# Patient Record
Sex: FEMALE | Race: WHITE | NOT HISPANIC OR LATINO | Employment: OTHER | URBAN - METROPOLITAN AREA
[De-identification: names, ages, dates, MRNs, and addresses within clinical notes are randomized per-mention and may not be internally consistent; named-entity substitution may affect disease eponyms.]

---

## 2017-06-07 ENCOUNTER — CONVERSION ENCOUNTER (OUTPATIENT)
Dept: MAMMOGRAPHY | Facility: HOSPITAL | Age: 47
End: 2017-06-07

## 2018-02-14 ENCOUNTER — PROCEDURE VISIT CONVERTED (OUTPATIENT)
Dept: PODIATRY | Facility: CLINIC | Age: 48
End: 2018-02-14
Attending: PODIATRIST

## 2018-04-16 ENCOUNTER — CONVERSION ENCOUNTER (OUTPATIENT)
Dept: MAMMOGRAPHY | Facility: HOSPITAL | Age: 48
End: 2018-04-16

## 2018-04-18 ENCOUNTER — PROCEDURE VISIT CONVERTED (OUTPATIENT)
Dept: PODIATRY | Facility: CLINIC | Age: 48
End: 2018-04-18
Attending: PODIATRIST

## 2018-04-30 ENCOUNTER — CONVERSION ENCOUNTER (OUTPATIENT)
Dept: MAMMOGRAPHY | Facility: HOSPITAL | Age: 48
End: 2018-04-30

## 2018-06-01 ENCOUNTER — CONVERSION ENCOUNTER (OUTPATIENT)
Dept: MAMMOGRAPHY | Facility: HOSPITAL | Age: 48
End: 2018-06-01

## 2018-06-20 ENCOUNTER — PROCEDURE VISIT CONVERTED (OUTPATIENT)
Dept: PODIATRY | Facility: CLINIC | Age: 48
End: 2018-06-20
Attending: PODIATRIST

## 2018-09-12 ENCOUNTER — PROCEDURE VISIT CONVERTED (OUTPATIENT)
Dept: PODIATRY | Facility: CLINIC | Age: 48
End: 2018-09-12
Attending: PODIATRIST

## 2018-11-08 ENCOUNTER — CONVERSION ENCOUNTER (OUTPATIENT)
Dept: MAMMOGRAPHY | Facility: HOSPITAL | Age: 48
End: 2018-11-08

## 2018-11-16 ENCOUNTER — PROCEDURE VISIT CONVERTED (OUTPATIENT)
Dept: PODIATRY | Facility: CLINIC | Age: 48
End: 2018-11-16
Attending: PODIATRIST

## 2019-01-21 ENCOUNTER — CONVERSION ENCOUNTER (OUTPATIENT)
Dept: PODIATRY | Facility: CLINIC | Age: 49
End: 2019-01-21

## 2019-01-21 ENCOUNTER — PROCEDURE VISIT CONVERTED (OUTPATIENT)
Dept: PODIATRY | Facility: CLINIC | Age: 49
End: 2019-01-21
Attending: PODIATRIST

## 2019-02-11 ENCOUNTER — OFFICE VISIT CONVERTED (OUTPATIENT)
Dept: SURGERY | Facility: CLINIC | Age: 49
End: 2019-02-11
Attending: NURSE PRACTITIONER

## 2019-02-14 ENCOUNTER — HOSPITAL ENCOUNTER (OUTPATIENT)
Dept: LAB | Facility: HOSPITAL | Age: 49
Discharge: HOME | End: 2019-02-14
Attending: PHYSICIAN ASSISTANT

## 2019-02-14 LAB
ALBUMIN SERPL-MCNC: 4.1 G/DL (ref 3.5–5)
ALBUMIN/GLOB SERPL: 1.3 {RATIO} (ref 1.4–2.6)
ALP SERPL-CCNC: 116 U/L (ref 42–98)
ALT SERPL-CCNC: 41 U/L (ref 10–40)
ANION GAP SERPL CALC-SCNC: 17 MMOL/L (ref 8–19)
AST SERPL-CCNC: 35 U/L (ref 15–50)
BILIRUB SERPL-MCNC: 0.74 MG/DL (ref 0.2–1.3)
BUN SERPL-MCNC: 10 MG/DL (ref 5–25)
BUN/CREAT SERPL: 16 {RATIO} (ref 6–20)
CALCIUM SERPL-MCNC: 8.7 MG/DL (ref 8.7–10.4)
CHLORIDE SERPL-SCNC: 105 MMOL/L (ref 99–111)
CONV CO2: 26 MMOL/L (ref 22–32)
CONV TOTAL PROTEIN: 7.2 G/DL (ref 6.3–8.2)
CREAT UR-MCNC: 0.62 MG/DL (ref 0.5–0.9)
GFR SERPLBLD BASED ON 1.73 SQ M-ARVRAT: >60 ML/MIN/{1.73_M2}
GLOBULIN UR ELPH-MCNC: 3.1 G/DL (ref 2–3.5)
GLUCOSE SERPL-MCNC: 91 MG/DL (ref 65–99)
OSMOLALITY SERPL CALC.SUM OF ELEC: 297 MOSM/KG (ref 273–304)
POTASSIUM SERPL-SCNC: 4.1 MMOL/L (ref 3.5–5.3)
SODIUM SERPL-SCNC: 144 MMOL/L (ref 135–147)

## 2019-03-25 ENCOUNTER — PROCEDURE VISIT CONVERTED (OUTPATIENT)
Dept: PODIATRY | Facility: CLINIC | Age: 49
End: 2019-03-25
Attending: PODIATRIST

## 2019-05-09 ENCOUNTER — HOSPITAL ENCOUNTER (OUTPATIENT)
Dept: LAB | Facility: HOSPITAL | Age: 49
Discharge: HOME | End: 2019-05-09
Attending: PHYSICIAN ASSISTANT

## 2019-05-09 LAB
25(OH)D3 SERPL-MCNC: 35.8 NG/ML (ref 30–100)
ALBUMIN SERPL-MCNC: 4.5 G/DL (ref 3.5–5)
ALBUMIN/GLOB SERPL: 1.7 {RATIO} (ref 1.4–2.6)
ALP SERPL-CCNC: 136 U/L (ref 42–98)
ALT SERPL-CCNC: 35 U/L (ref 10–40)
ANION GAP SERPL CALC-SCNC: 19 MMOL/L (ref 8–19)
AST SERPL-CCNC: 32 U/L (ref 15–50)
BASOPHILS # BLD AUTO: 0.05 10*3/UL (ref 0–0.2)
BASOPHILS NFR BLD AUTO: 0.8 % (ref 0–3)
BILIRUB SERPL-MCNC: 0.63 MG/DL (ref 0.2–1.3)
BUN SERPL-MCNC: 12 MG/DL (ref 5–25)
BUN/CREAT SERPL: 16 {RATIO} (ref 6–20)
CALCIUM SERPL-MCNC: 9.5 MG/DL (ref 8.7–10.4)
CHLORIDE SERPL-SCNC: 102 MMOL/L (ref 99–111)
CHOLEST SERPL-MCNC: 179 MG/DL (ref 107–200)
CHOLEST/HDLC SERPL: 2.5 {RATIO} (ref 3–6)
CONV ABS IMM GRAN: 0.02 10*3/UL (ref 0–0.2)
CONV CO2: 26 MMOL/L (ref 22–32)
CONV IMMATURE GRAN: 0.3 % (ref 0–1.8)
CONV TOTAL PROTEIN: 7.1 G/DL (ref 6.3–8.2)
CREAT UR-MCNC: 0.74 MG/DL (ref 0.5–0.9)
DEPRECATED RDW RBC AUTO: 39.2 FL (ref 36.4–46.3)
EOSINOPHIL # BLD AUTO: 0.13 10*3/UL (ref 0–0.7)
EOSINOPHIL # BLD AUTO: 2 % (ref 0–7)
ERYTHROCYTE [DISTWIDTH] IN BLOOD BY AUTOMATED COUNT: 12.6 % (ref 11.7–14.4)
GFR SERPLBLD BASED ON 1.73 SQ M-ARVRAT: >60 ML/MIN/{1.73_M2}
GLOBULIN UR ELPH-MCNC: 2.6 G/DL (ref 2–3.5)
GLUCOSE SERPL-MCNC: 87 MG/DL (ref 65–99)
HBA1C MFR BLD: 16.1 G/DL (ref 12–16)
HCT VFR BLD AUTO: 45.6 % (ref 37–47)
HDLC SERPL-MCNC: 73 MG/DL (ref 40–60)
LDLC SERPL CALC-MCNC: 76 MG/DL (ref 70–100)
LYMPHOCYTES # BLD AUTO: 1.68 10*3/UL (ref 1–5)
MCH RBC QN AUTO: 30.5 PG (ref 27–31)
MCHC RBC AUTO-ENTMCNC: 35.3 G/DL (ref 33–37)
MCV RBC AUTO: 86.4 FL (ref 81–99)
MONOCYTES # BLD AUTO: 0.43 10*3/UL (ref 0.2–1.2)
MONOCYTES NFR BLD AUTO: 6.7 % (ref 3–10)
NEUTROPHILS # BLD AUTO: 4.15 10*3/UL (ref 2–8)
NEUTROPHILS NFR BLD AUTO: 64.2 % (ref 30–85)
NRBC CBCN: 0 % (ref 0–0.7)
OSMOLALITY SERPL CALC.SUM OF ELEC: 295 MOSM/KG (ref 273–304)
PLATELET # BLD AUTO: 352 10*3/UL (ref 130–400)
PMV BLD AUTO: 10.2 FL (ref 9.4–12.3)
POTASSIUM SERPL-SCNC: 4 MMOL/L (ref 3.5–5.3)
RBC # BLD AUTO: 5.28 10*6/UL (ref 4.2–5.4)
SODIUM SERPL-SCNC: 143 MMOL/L (ref 135–147)
T4 FREE SERPL-MCNC: 1.4 NG/DL (ref 0.9–1.8)
TRIGL SERPL-MCNC: 148 MG/DL (ref 40–150)
TSH SERPL-ACNC: 4.29 M[IU]/L (ref 0.27–4.2)
VARIANT LYMPHS NFR BLD MANUAL: 26 % (ref 20–45)
VLDLC SERPL-MCNC: 30 MG/DL (ref 5–37)
WBC # BLD AUTO: 6.46 10*3/UL (ref 4.8–10.8)

## 2019-05-28 ENCOUNTER — PROCEDURE VISIT CONVERTED (OUTPATIENT)
Dept: PODIATRY | Facility: CLINIC | Age: 49
End: 2019-05-28
Attending: PODIATRIST

## 2019-05-28 ENCOUNTER — CONVERSION ENCOUNTER (OUTPATIENT)
Dept: PODIATRY | Facility: CLINIC | Age: 49
End: 2019-05-28

## 2019-06-24 ENCOUNTER — HOSPITAL ENCOUNTER (OUTPATIENT)
Dept: MAMMOGRAPHY | Facility: HOSPITAL | Age: 49
Discharge: HOME | End: 2019-06-24
Attending: INTERNAL MEDICINE

## 2019-07-17 ENCOUNTER — HOSPITAL ENCOUNTER (OUTPATIENT)
Dept: MAMMOGRAPHY | Facility: HOSPITAL | Age: 49
Discharge: HOME | End: 2019-07-17
Attending: INTERNAL MEDICINE

## 2019-08-05 ENCOUNTER — PROCEDURE VISIT CONVERTED (OUTPATIENT)
Dept: PODIATRY | Facility: CLINIC | Age: 49
End: 2019-08-05
Attending: PODIATRIST

## 2019-08-06 ENCOUNTER — HOSPITAL ENCOUNTER (OUTPATIENT)
Dept: ULTRASOUND IMAGING | Facility: HOSPITAL | Age: 49
Discharge: HOME | End: 2019-08-06
Attending: INTERNAL MEDICINE

## 2019-09-11 ENCOUNTER — HOSPITAL ENCOUNTER (OUTPATIENT)
Dept: SLEEP MEDICINE | Facility: HOSPITAL | Age: 49
Discharge: HOME | End: 2019-09-11
Attending: INTERNAL MEDICINE

## 2019-09-11 ENCOUNTER — OUTSIDE FACILITY SERVICE (OUTPATIENT)
Dept: SLEEP MEDICINE | Facility: HOSPITAL | Age: 49
End: 2019-09-11

## 2019-09-11 PROCEDURE — 99204 OFFICE O/P NEW MOD 45 MIN: CPT | Performed by: INTERNAL MEDICINE

## 2019-09-16 ENCOUNTER — HOSPITAL ENCOUNTER (OUTPATIENT)
Dept: MAMMOGRAPHY | Facility: HOSPITAL | Age: 49
Discharge: HOME | End: 2019-09-16
Attending: INTERNAL MEDICINE

## 2019-10-07 ENCOUNTER — CONVERSION ENCOUNTER (OUTPATIENT)
Dept: PODIATRY | Facility: CLINIC | Age: 49
End: 2019-10-07

## 2019-11-14 ENCOUNTER — HOSPITAL ENCOUNTER (OUTPATIENT)
Dept: LAB | Facility: HOSPITAL | Age: 49
Discharge: HOME | End: 2019-11-14
Attending: PHYSICIAN ASSISTANT

## 2019-11-14 LAB
25(OH)D3 SERPL-MCNC: 32.2 NG/ML (ref 30–100)
ALBUMIN SERPL-MCNC: 4.4 G/DL (ref 3.5–5)
ALBUMIN/GLOB SERPL: 1.6 {RATIO} (ref 1.4–2.6)
ALP SERPL-CCNC: 126 U/L (ref 42–98)
ALT SERPL-CCNC: 42 U/L (ref 10–40)
ANION GAP SERPL CALC-SCNC: 18 MMOL/L (ref 8–19)
AST SERPL-CCNC: 43 U/L (ref 15–50)
BASOPHILS # BLD AUTO: 0.07 10*3/UL (ref 0–0.2)
BASOPHILS NFR BLD AUTO: 1 % (ref 0–3)
BILIRUB SERPL-MCNC: 0.68 MG/DL (ref 0.2–1.3)
BUN SERPL-MCNC: 8 MG/DL (ref 5–25)
BUN/CREAT SERPL: 11 {RATIO} (ref 6–20)
CALCIUM SERPL-MCNC: 9.9 MG/DL (ref 8.7–10.4)
CHLORIDE SERPL-SCNC: 102 MMOL/L (ref 99–111)
CHOLEST SERPL-MCNC: 185 MG/DL (ref 107–200)
CHOLEST/HDLC SERPL: 2.2 {RATIO} (ref 3–6)
CONV ABS IMM GRAN: 0.02 10*3/UL (ref 0–0.2)
CONV CO2: 26 MMOL/L (ref 22–32)
CONV IMMATURE GRAN: 0.3 % (ref 0–1.8)
CONV TOTAL PROTEIN: 7.2 G/DL (ref 6.3–8.2)
CREAT UR-MCNC: 0.71 MG/DL (ref 0.5–0.9)
DEPRECATED RDW RBC AUTO: 39.4 FL (ref 36.4–46.3)
EOSINOPHIL # BLD AUTO: 0.09 10*3/UL (ref 0–0.7)
EOSINOPHIL # BLD AUTO: 1.3 % (ref 0–7)
ERYTHROCYTE [DISTWIDTH] IN BLOOD BY AUTOMATED COUNT: 12.8 % (ref 11.7–14.4)
GFR SERPLBLD BASED ON 1.73 SQ M-ARVRAT: >60 ML/MIN/{1.73_M2}
GLOBULIN UR ELPH-MCNC: 2.8 G/DL (ref 2–3.5)
GLUCOSE SERPL-MCNC: 87 MG/DL (ref 65–99)
HCT VFR BLD AUTO: 47.9 % (ref 37–47)
HDLC SERPL-MCNC: 83 MG/DL (ref 40–60)
HGB BLD-MCNC: 16.5 G/DL (ref 12–16)
LDLC SERPL CALC-MCNC: 72 MG/DL (ref 70–100)
LYMPHOCYTES # BLD AUTO: 1.69 10*3/UL (ref 1–5)
LYMPHOCYTES NFR BLD AUTO: 25.1 % (ref 20–45)
MCH RBC QN AUTO: 29.6 PG (ref 27–31)
MCHC RBC AUTO-ENTMCNC: 34.4 G/DL (ref 33–37)
MCV RBC AUTO: 85.8 FL (ref 81–99)
MONOCYTES # BLD AUTO: 0.49 10*3/UL (ref 0.2–1.2)
MONOCYTES NFR BLD AUTO: 7.3 % (ref 3–10)
NEUTROPHILS # BLD AUTO: 4.38 10*3/UL (ref 2–8)
NEUTROPHILS NFR BLD AUTO: 65 % (ref 30–85)
NRBC CBCN: 0 % (ref 0–0.7)
OSMOLALITY SERPL CALC.SUM OF ELEC: 292 MOSM/KG (ref 273–304)
PLATELET # BLD AUTO: 371 10*3/UL (ref 130–400)
PMV BLD AUTO: 10.5 FL (ref 9.4–12.3)
POTASSIUM SERPL-SCNC: 3.5 MMOL/L (ref 3.5–5.3)
RBC # BLD AUTO: 5.58 10*6/UL (ref 4.2–5.4)
SODIUM SERPL-SCNC: 142 MMOL/L (ref 135–147)
T4 FREE SERPL-MCNC: 1.4 NG/DL (ref 0.9–1.8)
TRIGL SERPL-MCNC: 148 MG/DL (ref 40–150)
TSH SERPL-ACNC: 3 M[IU]/L (ref 0.27–4.2)
VLDLC SERPL-MCNC: 30 MG/DL (ref 5–37)
WBC # BLD AUTO: 6.74 10*3/UL (ref 4.8–10.8)

## 2019-12-10 ENCOUNTER — PROCEDURE VISIT CONVERTED (OUTPATIENT)
Dept: PODIATRY | Facility: CLINIC | Age: 49
End: 2019-12-10
Attending: PODIATRIST

## 2020-01-20 ENCOUNTER — HOSPITAL ENCOUNTER (OUTPATIENT)
Dept: SLEEP MEDICINE | Facility: HOSPITAL | Age: 50
Discharge: HOME | End: 2020-01-20
Attending: INTERNAL MEDICINE

## 2020-01-20 ENCOUNTER — OUTSIDE FACILITY SERVICE (OUTPATIENT)
Dept: SLEEP MEDICINE | Facility: HOSPITAL | Age: 50
End: 2020-01-20

## 2020-01-20 PROCEDURE — 99213 OFFICE O/P EST LOW 20 MIN: CPT | Performed by: INTERNAL MEDICINE

## 2020-02-11 ENCOUNTER — PROCEDURE VISIT CONVERTED (OUTPATIENT)
Dept: PODIATRY | Facility: CLINIC | Age: 50
End: 2020-02-11
Attending: PODIATRIST

## 2020-02-11 ENCOUNTER — CONVERSION ENCOUNTER (OUTPATIENT)
Dept: PODIATRY | Facility: CLINIC | Age: 50
End: 2020-02-11

## 2020-03-09 ENCOUNTER — HOSPITAL ENCOUNTER (OUTPATIENT)
Dept: SLEEP MEDICINE | Facility: HOSPITAL | Age: 50
Discharge: HOME | End: 2020-03-09
Attending: INTERNAL MEDICINE

## 2020-03-09 ENCOUNTER — OUTSIDE FACILITY SERVICE (OUTPATIENT)
Dept: SLEEP MEDICINE | Facility: HOSPITAL | Age: 50
End: 2020-03-09

## 2020-03-09 PROCEDURE — 99213 OFFICE O/P EST LOW 20 MIN: CPT | Performed by: INTERNAL MEDICINE

## 2020-04-21 ENCOUNTER — TELEMEDICINE CONVERTED (OUTPATIENT)
Dept: GASTROENTEROLOGY | Facility: CLINIC | Age: 50
End: 2020-04-21
Attending: INTERNAL MEDICINE

## 2020-05-01 ENCOUNTER — HOSPITAL ENCOUNTER (OUTPATIENT)
Dept: LAB | Facility: HOSPITAL | Age: 50
Discharge: HOME | End: 2020-05-01
Attending: PHYSICIAN ASSISTANT

## 2020-05-01 LAB
25(OH)D3 SERPL-MCNC: 31.5 NG/ML (ref 30–100)
ALBUMIN SERPL-MCNC: 4.5 G/DL (ref 3.5–5)
ALBUMIN/GLOB SERPL: 1.6 {RATIO} (ref 1.4–2.6)
ALP SERPL-CCNC: 128 U/L (ref 42–98)
ALT SERPL-CCNC: 46 U/L (ref 10–40)
ANION GAP SERPL CALC-SCNC: 20 MMOL/L (ref 8–19)
AST SERPL-CCNC: 70 U/L (ref 15–50)
BASOPHILS # BLD AUTO: 0.06 10*3/UL (ref 0–0.2)
BASOPHILS NFR BLD AUTO: 0.9 % (ref 0–3)
BILIRUB SERPL-MCNC: 0.88 MG/DL (ref 0.2–1.3)
BUN SERPL-MCNC: 11 MG/DL (ref 5–25)
BUN/CREAT SERPL: 16 {RATIO} (ref 6–20)
CALCIUM SERPL-MCNC: 9.6 MG/DL (ref 8.7–10.4)
CHLORIDE SERPL-SCNC: 101 MMOL/L (ref 99–111)
CHOLEST SERPL-MCNC: 186 MG/DL (ref 107–200)
CHOLEST/HDLC SERPL: 2.2 {RATIO} (ref 3–6)
CONV ABS IMM GRAN: 0.03 10*3/UL (ref 0–0.2)
CONV CO2: 26 MMOL/L (ref 22–32)
CONV IMMATURE GRAN: 0.4 % (ref 0–1.8)
CONV TOTAL PROTEIN: 7.3 G/DL (ref 6.3–8.2)
CREAT UR-MCNC: 0.69 MG/DL (ref 0.5–0.9)
DEPRECATED RDW RBC AUTO: 39.2 FL (ref 36.4–46.3)
EOSINOPHIL # BLD AUTO: 0.11 10*3/UL (ref 0–0.7)
EOSINOPHIL # BLD AUTO: 1.6 % (ref 0–7)
ERYTHROCYTE [DISTWIDTH] IN BLOOD BY AUTOMATED COUNT: 12.8 % (ref 11.7–14.4)
GFR SERPLBLD BASED ON 1.73 SQ M-ARVRAT: >60 ML/MIN/{1.73_M2}
GLOBULIN UR ELPH-MCNC: 2.8 G/DL (ref 2–3.5)
GLUCOSE SERPL-MCNC: 89 MG/DL (ref 65–99)
HCT VFR BLD AUTO: 47.6 % (ref 37–47)
HDLC SERPL-MCNC: 83 MG/DL (ref 40–60)
HGB BLD-MCNC: 16.6 G/DL (ref 12–16)
LDLC SERPL CALC-MCNC: 82 MG/DL (ref 70–100)
LYMPHOCYTES # BLD AUTO: 1.57 10*3/UL (ref 1–5)
LYMPHOCYTES NFR BLD AUTO: 22.6 % (ref 20–45)
MCH RBC QN AUTO: 29.7 PG (ref 27–31)
MCHC RBC AUTO-ENTMCNC: 34.9 G/DL (ref 33–37)
MCV RBC AUTO: 85.3 FL (ref 81–99)
MONOCYTES # BLD AUTO: 0.5 10*3/UL (ref 0.2–1.2)
MONOCYTES NFR BLD AUTO: 7.2 % (ref 3–10)
NEUTROPHILS # BLD AUTO: 4.69 10*3/UL (ref 2–8)
NEUTROPHILS NFR BLD AUTO: 67.3 % (ref 30–85)
NRBC CBCN: 0 % (ref 0–0.7)
OSMOLALITY SERPL CALC.SUM OF ELEC: 295 MOSM/KG (ref 273–304)
PLATELET # BLD AUTO: 321 10*3/UL (ref 130–400)
PMV BLD AUTO: 10.3 FL (ref 9.4–12.3)
POTASSIUM SERPL-SCNC: 3.9 MMOL/L (ref 3.5–5.3)
RBC # BLD AUTO: 5.58 10*6/UL (ref 4.2–5.4)
SODIUM SERPL-SCNC: 143 MMOL/L (ref 135–147)
T4 FREE SERPL-MCNC: 1.5 NG/DL (ref 0.9–1.8)
TRIGL SERPL-MCNC: 107 MG/DL (ref 40–150)
TSH SERPL-ACNC: 4.27 M[IU]/L (ref 0.27–4.2)
VLDLC SERPL-MCNC: 21 MG/DL (ref 5–37)
WBC # BLD AUTO: 6.96 10*3/UL (ref 4.8–10.8)

## 2020-05-28 ENCOUNTER — PROCEDURE VISIT CONVERTED (OUTPATIENT)
Dept: PODIATRY | Facility: CLINIC | Age: 50
End: 2020-05-28
Attending: PODIATRIST

## 2020-07-22 ENCOUNTER — HOSPITAL ENCOUNTER (OUTPATIENT)
Dept: SLEEP MEDICINE | Facility: HOSPITAL | Age: 50
Discharge: HOME | End: 2020-07-22
Attending: INTERNAL MEDICINE

## 2020-07-22 ENCOUNTER — OUTSIDE FACILITY SERVICE (OUTPATIENT)
Dept: SLEEP MEDICINE | Facility: HOSPITAL | Age: 50
End: 2020-07-22

## 2020-07-22 PROCEDURE — 99213 OFFICE O/P EST LOW 20 MIN: CPT | Performed by: INTERNAL MEDICINE

## 2020-07-30 ENCOUNTER — CONVERSION ENCOUNTER (OUTPATIENT)
Dept: PODIATRY | Facility: CLINIC | Age: 50
End: 2020-07-30

## 2020-07-30 ENCOUNTER — PROCEDURE VISIT CONVERTED (OUTPATIENT)
Dept: PODIATRY | Facility: CLINIC | Age: 50
End: 2020-07-30
Attending: PODIATRIST

## 2020-08-21 ENCOUNTER — HOSPITAL ENCOUNTER (OUTPATIENT)
Dept: OTHER | Facility: HOSPITAL | Age: 50
Discharge: HOME | End: 2020-08-21
Attending: INTERNAL MEDICINE

## 2020-08-21 LAB
ALBUMIN SERPL-MCNC: 4.6 G/DL (ref 3.5–5)
ALBUMIN/GLOB SERPL: 1.6 {RATIO} (ref 1.4–2.6)
ALP SERPL-CCNC: 163 U/L (ref 42–98)
ALT SERPL-CCNC: 42 U/L (ref 10–40)
ANION GAP SERPL CALC-SCNC: 20 MMOL/L (ref 8–19)
AST SERPL-CCNC: 55 U/L (ref 15–50)
BASOPHILS # BLD AUTO: 0.07 10*3/UL (ref 0–0.2)
BASOPHILS NFR BLD AUTO: 1.2 % (ref 0–3)
BILIRUB SERPL-MCNC: 0.88 MG/DL (ref 0.2–1.3)
BUN SERPL-MCNC: 11 MG/DL (ref 5–25)
BUN/CREAT SERPL: 16 {RATIO} (ref 6–20)
CALCIUM SERPL-MCNC: 9.7 MG/DL (ref 8.7–10.4)
CHLORIDE SERPL-SCNC: 104 MMOL/L (ref 99–111)
CONV ABS IMM GRAN: 0.02 10*3/UL (ref 0–0.2)
CONV CO2: 23 MMOL/L (ref 22–32)
CONV IMMATURE GRAN: 0.3 % (ref 0–1.8)
CONV TOTAL PROTEIN: 7.4 G/DL (ref 6.3–8.2)
CREAT UR-MCNC: 0.68 MG/DL (ref 0.5–0.9)
DEPRECATED RDW RBC AUTO: 38 FL (ref 36.4–46.3)
EOSINOPHIL # BLD AUTO: 0.07 10*3/UL (ref 0–0.7)
EOSINOPHIL # BLD AUTO: 1.2 % (ref 0–7)
ERYTHROCYTE [DISTWIDTH] IN BLOOD BY AUTOMATED COUNT: 12.4 % (ref 11.7–14.4)
FERRITIN SERPL-MCNC: 104 NG/ML (ref 10–200)
GFR SERPLBLD BASED ON 1.73 SQ M-ARVRAT: >60 ML/MIN/{1.73_M2}
GLOBULIN UR ELPH-MCNC: 2.8 G/DL (ref 2–3.5)
GLUCOSE SERPL-MCNC: 90 MG/DL (ref 65–99)
HCT VFR BLD AUTO: 44.8 % (ref 37–47)
HGB BLD-MCNC: 16.1 G/DL (ref 12–16)
IRON SATN MFR SERPL: 43 % (ref 20–55)
IRON SERPL-MCNC: 188 UG/DL (ref 60–170)
LYMPHOCYTES # BLD AUTO: 1.63 10*3/UL (ref 1–5)
LYMPHOCYTES NFR BLD AUTO: 27.6 % (ref 20–45)
MCH RBC QN AUTO: 30.3 PG (ref 27–31)
MCHC RBC AUTO-ENTMCNC: 35.9 G/DL (ref 33–37)
MCV RBC AUTO: 84.4 FL (ref 81–99)
MONOCYTES # BLD AUTO: 0.47 10*3/UL (ref 0.2–1.2)
MONOCYTES NFR BLD AUTO: 8 % (ref 3–10)
NEUTROPHILS # BLD AUTO: 3.64 10*3/UL (ref 2–8)
NEUTROPHILS NFR BLD AUTO: 61.7 % (ref 30–85)
NRBC CBCN: 0 % (ref 0–0.7)
OSMOLALITY SERPL CALC.SUM OF ELEC: 295 MOSM/KG (ref 273–304)
PLATELET # BLD AUTO: 351 10*3/UL (ref 130–400)
PMV BLD AUTO: 9.6 FL (ref 9.4–12.3)
POTASSIUM SERPL-SCNC: 3.7 MMOL/L (ref 3.5–5.3)
RBC # BLD AUTO: 5.31 10*6/UL (ref 4.2–5.4)
SODIUM SERPL-SCNC: 143 MMOL/L (ref 135–147)
T4 FREE SERPL-MCNC: 1.5 NG/DL (ref 0.9–1.8)
TIBC SERPL-MCNC: 439 UG/DL (ref 245–450)
TRANSFERRIN SERPL-MCNC: 307 MG/DL (ref 250–380)
TSH SERPL-ACNC: 3.05 M[IU]/L (ref 0.27–4.2)
WBC # BLD AUTO: 5.9 10*3/UL (ref 4.8–10.8)

## 2020-08-22 LAB
CERULOPLASMIN SERPL-MCNC: 26.5 MG/DL (ref 19–39)
CONV HEPATITIS B SURFACE AG W CONFIRMATION RE: NEGATIVE
DEPRECATED MITOCHONDRIA M2 IGG SER-ACNC: <20 UNITS (ref 0–20)
HAV IGM SERPL QL IA: NEGATIVE
HBV CORE IGM SERPL QL IA: NEGATIVE
HCV AB SER DONR QL: 0.1 S/CO RATIO (ref 0–0.9)
SMOOTH MUSCLE F-ACTIN AB IGG: 18 UNITS (ref 0–19)

## 2020-08-23 LAB
DSDNA AB SER-ACNC: ABNORMAL [IU]/ML
ENA AB SER IA-ACNC: NEGATIVE {RATIO}

## 2020-08-24 LAB
ALBUMIN SERPL-MCNC: 4.1 G/DL (ref 2.9–4.4)
ALBUMIN/GLOB SERPL: 1.4 {RATIO} (ref 0.7–1.7)
ALPHA2 GLOB SERPL ELPH-MCNC: 0.6 G/DL (ref 0.4–1)
BETA GLOBULIN: 1 G/DL (ref 0.7–1.3)
CONV ALPHA-1-GLOBULIN: 0.2 G/DL (ref 0–0.4)
CONV IMMUNOGLOBULIN G (IGG): 1042 MG/DL (ref 586–1602)
CONV IMMUNOGLOBULIN M (IGM): 68 MG/DL (ref 26–217)
CONV PE INTERPRETATION: ABNORMAL
CONV PE NOTE: ABNORMAL
CONV TOTAL PROTEIN: 7 G/DL (ref 6–8.5)
GAMMA GLOB SERPL ELPH-MCNC: 1 G/DL (ref 0.4–1.8)
GLOBULIN UR ELPH-MCNC: 2.9 G/DL (ref 2.2–3.9)
IGA SERPL-MCNC: 79 MG/DL (ref 87–352)
M-SPIKE: ABNORMAL G/DL
PROT PATTERN SERPL IFE-IMP: ABNORMAL

## 2020-08-25 ENCOUNTER — OFFICE VISIT CONVERTED (OUTPATIENT)
Dept: GASTROENTEROLOGY | Facility: CLINIC | Age: 50
End: 2020-08-25
Attending: INTERNAL MEDICINE

## 2020-08-25 LAB — CONV NASH FIBROSURE: NORMAL

## 2020-08-26 LAB
A1AT SERPL-MCNC: 133 MG/DL (ref 101–187)
PHENOTYPE: NORMAL

## 2020-09-01 ENCOUNTER — HOSPITAL ENCOUNTER (OUTPATIENT)
Dept: MAMMOGRAPHY | Facility: HOSPITAL | Age: 50
Discharge: HOME | End: 2020-09-01
Attending: PHYSICIAN ASSISTANT

## 2020-10-13 ENCOUNTER — PROCEDURE VISIT CONVERTED (OUTPATIENT)
Dept: PODIATRY | Facility: CLINIC | Age: 50
End: 2020-10-13
Attending: PODIATRIST

## 2020-10-19 ENCOUNTER — HOSPITAL ENCOUNTER (OUTPATIENT)
Dept: SLEEP MEDICINE | Facility: HOSPITAL | Age: 50
Discharge: HOME | End: 2020-10-19
Attending: INTERNAL MEDICINE

## 2020-10-19 ENCOUNTER — OUTSIDE FACILITY SERVICE (OUTPATIENT)
Dept: SLEEP MEDICINE | Facility: HOSPITAL | Age: 50
End: 2020-10-19

## 2020-10-19 PROCEDURE — 99212 OFFICE O/P EST SF 10 MIN: CPT | Performed by: INTERNAL MEDICINE

## 2020-11-20 ENCOUNTER — HOSPITAL ENCOUNTER (OUTPATIENT)
Dept: LAB | Facility: HOSPITAL | Age: 50
Discharge: HOME | End: 2020-11-20
Attending: INTERNAL MEDICINE

## 2020-11-20 LAB
ALBUMIN SERPL-MCNC: 4.5 G/DL (ref 3.5–5)
ALBUMIN/GLOB SERPL: 1.6 {RATIO} (ref 1.4–2.6)
ALP SERPL-CCNC: 177 U/L (ref 42–98)
ALT SERPL-CCNC: 49 U/L (ref 10–40)
ANION GAP SERPL CALC-SCNC: 16 MMOL/L (ref 8–19)
AST SERPL-CCNC: 56 U/L (ref 15–50)
BASOPHILS # BLD AUTO: 0.07 10*3/UL (ref 0–0.2)
BASOPHILS NFR BLD AUTO: 1.2 % (ref 0–3)
BILIRUB SERPL-MCNC: 0.67 MG/DL (ref 0.2–1.3)
BUN SERPL-MCNC: 10 MG/DL (ref 5–25)
BUN/CREAT SERPL: 13 {RATIO} (ref 6–20)
CALCIUM SERPL-MCNC: 10.9 MG/DL (ref 8.7–10.4)
CHLORIDE SERPL-SCNC: 104 MMOL/L (ref 99–111)
CHOLEST SERPL-MCNC: 192 MG/DL (ref 107–200)
CHOLEST/HDLC SERPL: 2.6 {RATIO} (ref 3–6)
CONV ABS IMM GRAN: 0.02 10*3/UL (ref 0–0.2)
CONV CO2: 26 MMOL/L (ref 22–32)
CONV IMMATURE GRAN: 0.3 % (ref 0–1.8)
CONV TOTAL PROTEIN: 7.3 G/DL (ref 6.3–8.2)
CREAT UR-MCNC: 0.76 MG/DL (ref 0.5–0.9)
DEPRECATED RDW RBC AUTO: 38 FL (ref 36.4–46.3)
EOSINOPHIL # BLD AUTO: 0.09 10*3/UL (ref 0–0.7)
EOSINOPHIL # BLD AUTO: 1.5 % (ref 0–7)
ERYTHROCYTE [DISTWIDTH] IN BLOOD BY AUTOMATED COUNT: 12.3 % (ref 11.7–14.4)
FERRITIN SERPL-MCNC: 206 NG/ML (ref 10–200)
FOLATE SERPL-MCNC: >20 NG/ML (ref 4.8–20)
GFR SERPLBLD BASED ON 1.73 SQ M-ARVRAT: >60 ML/MIN/{1.73_M2}
GLOBULIN UR ELPH-MCNC: 2.8 G/DL (ref 2–3.5)
GLUCOSE SERPL-MCNC: 90 MG/DL (ref 65–99)
HCT VFR BLD AUTO: 44.6 % (ref 37–47)
HDLC SERPL-MCNC: 74 MG/DL (ref 40–60)
HGB BLD-MCNC: 15.8 G/DL (ref 12–16)
IRON SATN MFR SERPL: 37 % (ref 20–55)
IRON SERPL-MCNC: 150 UG/DL (ref 60–170)
LDLC SERPL CALC-MCNC: 93 MG/DL (ref 70–100)
LYMPHOCYTES # BLD AUTO: 1.6 10*3/UL (ref 1–5)
LYMPHOCYTES NFR BLD AUTO: 26.3 % (ref 20–45)
MCH RBC QN AUTO: 30.1 PG (ref 27–31)
MCHC RBC AUTO-ENTMCNC: 35.4 G/DL (ref 33–37)
MCV RBC AUTO: 85 FL (ref 81–99)
MONOCYTES # BLD AUTO: 0.48 10*3/UL (ref 0.2–1.2)
MONOCYTES NFR BLD AUTO: 7.9 % (ref 3–10)
NEUTROPHILS # BLD AUTO: 3.82 10*3/UL (ref 2–8)
NEUTROPHILS NFR BLD AUTO: 62.8 % (ref 30–85)
NRBC CBCN: 0 % (ref 0–0.7)
OSMOLALITY SERPL CALC.SUM OF ELEC: 293 MOSM/KG (ref 273–304)
PLATELET # BLD AUTO: 376 10*3/UL (ref 130–400)
PMV BLD AUTO: 10.2 FL (ref 9.4–12.3)
POTASSIUM SERPL-SCNC: 3.7 MMOL/L (ref 3.5–5.3)
RBC # BLD AUTO: 5.25 10*6/UL (ref 4.2–5.4)
SODIUM SERPL-SCNC: 142 MMOL/L (ref 135–147)
TIBC SERPL-MCNC: 408 UG/DL (ref 245–450)
TRANSFERRIN SERPL-MCNC: 285 MG/DL (ref 250–380)
TRIGL SERPL-MCNC: 126 MG/DL (ref 40–150)
TSH SERPL-ACNC: 2.96 M[IU]/L (ref 0.27–4.2)
VIT B12 SERPL-MCNC: 792 PG/ML (ref 211–911)
VLDLC SERPL-MCNC: 25 MG/DL (ref 5–37)
WBC # BLD AUTO: 6.08 10*3/UL (ref 4.8–10.8)

## 2020-11-21 LAB — ESTRADIOL SERPL HS-MCNC: <5 PG/ML

## 2020-11-24 LAB — DHEA SERPL-MCNC: 104 NG/DL (ref 31–701)

## 2020-12-01 LAB
CONV TESTOSTERONE, FREE: 1.4 PG/ML
TESTOST FREE MFR SERPL: 0.9 %
TESTOST SERPL-MCNC: 15 NG/DL

## 2020-12-15 ENCOUNTER — PROCEDURE VISIT CONVERTED (OUTPATIENT)
Dept: PODIATRY | Facility: CLINIC | Age: 50
End: 2020-12-15
Attending: PODIATRIST

## 2021-01-26 ENCOUNTER — OFFICE VISIT CONVERTED (OUTPATIENT)
Dept: GASTROENTEROLOGY | Facility: CLINIC | Age: 51
End: 2021-01-26
Attending: NURSE PRACTITIONER

## 2021-03-09 ENCOUNTER — PROCEDURE VISIT CONVERTED (OUTPATIENT)
Dept: PODIATRY | Facility: CLINIC | Age: 51
End: 2021-03-09
Attending: PODIATRIST

## 2021-03-23 ENCOUNTER — HOSPITAL ENCOUNTER (OUTPATIENT)
Dept: ULTRASOUND IMAGING | Facility: HOSPITAL | Age: 51
Discharge: HOME | End: 2021-03-23
Attending: INTERNAL MEDICINE

## 2021-05-12 NOTE — PROGRESS NOTES
"   Quick Note      Patient Name: Carmita Devi   Patient ID: [id number]   Sex: Female   YOB: 1970    Primary Care Provider: Samantha Mcgowan PA-C   Referring Provider: Bertin Fountain DPM    Visit Date: April 21, 2020    Provider: Daphney Hurst MD   Location: [facility]   Location Address: [address] [place] [address], [address]  [place], [place]  [id number]   Location Phone: [phone]          History Of Present Illness  TELEHEALTH TELEPHONE VISIT  Chief Complaint: CHIEF COMPLAINT   Carmita Devi is a 50 year old /White female who is presenting for evaluation via telehealth telephone visit. Verbal consent obtained before beginning visit.   Provider spent 13 minutes with the patient during telehealth visit.   The following staff were present during this visit: Daphney Hurst MD; Kimi Wolff MA   Past Medical History/Overview of Patient Symptoms     Ms. Devi is a 50 year old female with h/o developmental delay, HTN, GERD who presents for Telehealth evaluation of elevated liver enzymes.  Unable to perform Zoom visit secondary to video issues.  History obtained through patient's father.  Pt with no previously known liver disease or issues.  Elevated liver enzymes a recent issue.  No family history of liver disease.  No EtOH history.  No new medications.  Takes OTC probiotic but no other herbal supplements.    Labs from Dr. Yepez's office have been requested.    Ultrasound abdomen (2/6/2020): Echogenicity and echotexture of the hepatic parenchyma is within normal limits.  No hepatic mass.  The intrahepatic bile ducts are normal in caliber.  Negative abdominal ultrasound.       Vitals  Date Time BP Position Site L\R Cuff Size HR RR TEMP (F) WT  HT  BMI kg/m2 BSA m2 O2 Sat HC       04/21/2020 10:24 AM         101lbs 16oz 4'  9\" 22.07 1.36               Assessment  · Elevated liver enzymes     790.5/R74.8      Plan  · Orders  o CBC with Auto Diff [facility] (41817) - - " 04/21/2020  o CMP [facility] (70086) - - 04/21/2020  o TSH and FT4 (42908, 18078, THYII) - - 04/21/2020  o Physician Telephone Evaluation, 11-20 minutes (89754) - - 04/21/2020  o Acute hepatitis panel (HAV IgM, HbcAb IgM, HbsAg, HCV) (02697, 19537, 61430, 32659, 35168) - - 04/21/2020  o Ceruloplasmin level (18427) - - 04/21/2020  o Anti-Smooth Muscle Antibody (ASMA) [facility] (77365) - - 04/21/2020  o Anti-mitochondrial antibody assay (36929) - - 04/21/2020  o Alpha-1-Antitrypsin/Phenotype [facility] (57426, 21629) - - 04/21/2020  o Iron Profile (Iron 86707 TIBC 82938 and Transferrin 10086) (IRONP) - - 04/21/2020  o KATTY (antinuclear antibody profile) by enzyme immunoassay (78553) - - 04/21/2020  o Ferritin ser/plas (49626) - - 04/21/2020  o GRIMALDO Fibrosure [facility] (drawn at [facility] only and must be fasting 8 hours; requires HT and WT) (01112, 65921, 48853, 82992, 37528, 87553, 76052, 47109, 03906, 52428) - - 04/21/2020  o Immunoelectrophoresis, Serum [facility] (97544, 49478, 71041, 74502) - - 04/21/2020  · Medications  o Medications have been Reconciled  o Transition of Care or Provider Policy  · Instructions  o Plan Of Care:   o Elevated liver enzymes: previous labs requested. Prior imaging indicates liver enzymes have been elevated for at least 2 months. Will perform additional liver workup as above. Further results pending results of testing.  o Health maintenance: discuss routine colon cancer screening at f/u appt.  · Disposition  o Follow Up in 4 months.            Electronically Signed by: Daphney Hurst MD -Author on April 21, 2020 04:53:40 PM

## 2021-05-13 NOTE — PROGRESS NOTES
Progress Note      Patient Name: Carmita Devi   Patient ID: [id number]   Sex: Female   YOB: 1970    Primary Care Provider: Samantha Mcgowan PA-C   Referring Provider: Bertin Fountain DPM    Visit Date: October 13, 2020    Provider: Bertin Fountain DPM   Location: [facility]   Location Address: [address] [place] [address]  [id number]   Location Phone: [phone]          Chief Complaint  · Routine Foot Care Visit      History Of Present Illness  Carmita Devi complains of painful, elongated toenails which are thickened, yellowed, chalky, and cause pain with shoe gear and ambulation.      New, Established, New Problem:  Established   Location:  Toenails  Duration:   Greater than five years  Onset:  Gradual  Nature:  Sore with palpation.  Stable, worsening, improving:   stable  Aggravating factors:  Pain with shoe gear and ambulation.  Previous Treatment:  Debridement    Patient denies any fevers, chills, nausea, vomiting, nor any other constitutional signs nor symptoms.    Patient relates no medical changes since their last visit.       Past Medical History  Allergic rhinitis; Chronic frontal sinusitis; Chronic maxillary sinusitis; Corns and callus; Deviated septum; Difficulty in walking; Foot pain, left; Foot pain, right; GERD (gastroesophageal reflux disease); Headache; High blood pressure; Hoarseness; Ingrowing toenail; Mental retardation; Myofacial muscle pain; Otalgia; Tinea unguium         Past Surgical History  Sinus Surgery         Medication List  AmLactin 12 % topical lotion; amlodipine 5 mg oral tablet; ammonium lactate 12 % topical cream; baclofen 10 mg oral tablet; DermaZinc Spray 0.25 % topical spray,non-aerosol; fluticasone 50 mcg/actuation nasal spray,suspension; Premarin 0.625 mg/gram vaginal cream; Prolia 60 mg/mL subcutaneous syringe; Vitamin D3 2,000 unit oral capsule; xylitol sinus spray nasal spray         Allergy List  SULFA (SULFONAMIDES)  "      Allergies Reconciled  Family Medical History  No family history of colorectal cancer         Social History  Alcohol (Never); Tobacco (Never)         Review of Systems  · Constitutional  o Denies  o : fatigue, night sweats  · Eyes  o Denies  o : double vision, blurred vision  · HENT  o Denies  o : vertigo, recent head injury  · Cardiovascular  o Denies  o : chest pain, irregular heart beats  · Respiratory  o Denies  o : shortness of breath, productive cough  · Gastrointestinal  o Denies  o : nausea, vomiting  · Genitourinary  o Denies  o : dysuria, urinary retention  · Integument  o * See HPI  · Neurologic  o Denies  o : altered mental status, seizures  · Musculoskeletal  o Denies  o : joint swelling, limitation of motion  · Endocrine  o Denies  o : cold intolerance, heat intolerance  · Heme-Lymph  o Denies  o : petechiae, lymph node enlargement or tenderness  · Allergic-Immunologic  o Denies  o : frequent illnesses      Vitals  Date Time BP Position Site L\R Cuff Size HR RR TEMP (F) WT  HT  BMI kg/m2 BSA m2 O2 Sat FR L/min FiO2        10/13/2020 10:37 /83 Sitting    82 - R  97.6 104lbs 0oz 4'  9\" 22.51 1.38 100 %            Physical Examination  · Constitutional  o Appearance  o : well-nourished, well developed, no obvious deformities present, impaired cognition. Patient uses a cane for assistance with ambulation.  · Eyes  o Vision  o : Patient wearing glasses.   · Respiratory  o Respiratory Effort  o : No labored breathing. Good respiratory effort.   · Cardiovascular  o Peripheral Vascular System  o :   § Pedal Pulses  § : Pedal Pulses are 2+ and symmetrical  § Extremities  § : There is no edema of the lower extremities  · Musculoskeletal  o Extremeties/Joint  o : Lower extremity muscle strength and range of motion is equal and symmetrical bilaterally. The knees are noted to be in normal alignment. Ankle alignment and range of motion is normal and foot structure is normal.  · Skin and Subcutaneous " Tissue  o General Inspection  o : no lesions present, no areas of discoloration, skin turgor normal, texture normal  · Neurologic  o Sensation  o : Sharp/dull sensation is within normal limits. [place]-Vanessa 5.07 monofilament intact to all assessed areas.   · Toes  o Toes: Right Foot  o :   § Toenails  § : Toenails are hypertrophic, mycotc, dystrophic, thickened, with sublingual debris, incurvated, brittle toenail(s) at nail-1, 2, 3, 4,, Oncholysis of the foot   o Toes: Left Foot  o :   § Toenails  § : Toenails are hypertrophic, mycotc, dystrophic, thickened, with sublingual debris, incurvated, brittle toenail(s) at nail-1, 2, 3, 4,, Onycholysis of the foot   · Procedures  o Nail Debridement  o : Nail debridement is indicated for the following toenails:-left hallux, left 2nd toe, left 3rd toe, left 4th toe, right hallux, right 2nd toe, right 3rd toe, right 4th toe,, The nail was debrided of excessive thickness to appropriate levels of comfort and contour using nail nippers. The procedure was without complications.          Assessment  · Foot pain, left     729.5/M79.672  · Foot pain, right     729.5/M79.671  · Ingrowing nail     703.0/L60.0  · Tinea unguium     110.1/B35.1  · Difficulty in walking     719.7/R26.2  · Ingrowing toenail     703.0/L60.0      Plan  · Orders  o Debridement of six or more nails (22276, 60732, 84232, 32256, 02308, 56627, 59516, 41261) - 729.5/M79.672, 729.5/M79.671, 110.1/B35.1, 719.7/R26.2 - 10/13/2020  · Medications  o Medications have been Reconciled  o Transition of Care or Provider Policy  · Instructions  o I have discussed the findings of this evaluation with the patient. The discussion included a complete verbal explanation of any changes in the examination results, diagnosis, and the current treatment plan. A schedule for future care needs was explained. If any questions should arise after returning home, I have encouraged the patient to feel free to contact Dr. Fountain. The  patient states understanding and agreement with this plan.   o Patient is to monitor for problems and to contact Dr. Fountain for follow-up should such signs occur. Patient states understanding and agreement with this plan.   o Encouraged to follow-up with Primary Care Provider for preventative care.   o Follow up in 9 weeks for Routine Foot Care.   o Electronically Identified Patient Education Materials Provided Electronically  · Disposition  o Call or Return if symptoms worsen or persist.            Electronically Signed by: Bertin Fountain DPM -Author on October 13, 2020 11:04:45 AM

## 2021-05-13 NOTE — PROGRESS NOTES
Progress Note      Patient Name: Carmita Devi   Patient ID: [id number]   Sex: Female   YOB: 1970    Primary Care Provider: Samantha Mcgowan PA-C   Referring Provider: Bertin Fountain DPM    Visit Date: July 30, 2020    Provider: Bertin Fountain DPM   Location: [facility]   Location Address: [address] [place] [address]  [id number]   Location Phone: [phone]          Chief Complaint  · Routine Foot Care Visit      History Of Present Illness  Carmita Devi complains of painful, elongated toenails which are thickened, yellowed, chalky, and cause pain with shoe gear and ambulation.      New, Established, New Problem:  Established   Location:  Toenails  Duration:   Greater than five years  Onset:  Gradual  Nature:  Sore with palpation.  Stable, worsening, improving:   stable  Aggravating factors:  Pain with shoe gear and ambulation.  Previous Treatment:  Debridement    Patient denies any fevers, chills, nausea, vomiting, nor any other constitutional signs nor symptoms.    Patient reports that no changes in their medications with their recent appointment with their primary care provider.       Past Medical History  Allergic rhinitis; Chronic frontal sinusitis; Chronic maxillary sinusitis; Corns and callus; Deviated septum; Difficulty in walking; Foot pain, left; Foot pain, right; GERD (gastroesophageal reflux disease); Headache; High blood pressure; Hoarseness; Ingrowing toenail; Mental retardation; Myofacial muscle pain; Otalgia; Tinea unguium         Past Surgical History  Sinus Surgery         Medication List  AmLactin 12 % topical lotion; amlodipine 5 mg oral tablet; ammonium lactate 12 % topical cream; baclofen 10 mg oral tablet; DermaZinc Spray 0.25 % topical spray,non-aerosol; fluticasone 50 mcg/actuation nasal spray,suspension; Prolia 60 mg/mL subcutaneous syringe; Vitamin D3 2,000 unit oral capsule; xylitol sinus spray nasal spray  "        Allergy List  SULFA (SULFONAMIDES)       Allergies Reconciled  Family Medical History  No family history of colorectal cancer         Social History  Alcohol (Never); Tobacco (Never)         Review of Systems  · Constitutional  o Denies  o : fatigue, night sweats  · Eyes  o Denies  o : double vision, blurred vision  · HENT  o Denies  o : vertigo, recent head injury  · Cardiovascular  o Denies  o : chest pain, irregular heart beats  · Respiratory  o Denies  o : shortness of breath, productive cough  · Gastrointestinal  o Denies  o : nausea, vomiting  · Genitourinary  o Denies  o : dysuria, urinary retention  · Integument  o * See HPI  · Neurologic  o Denies  o : altered mental status, seizures  · Musculoskeletal  o Denies  o : joint swelling, limitation of motion  · Endocrine  o Denies  o : cold intolerance, heat intolerance  · Heme-Lymph  o Denies  o : petechiae, lymph node enlargement or tenderness  · Allergic-Immunologic  o Denies  o : frequent illnesses      Vitals  Date Time BP Position Site L\R Cuff Size HR RR TEMP (F) WT  HT  BMI kg/m2 BSA m2 O2 Sat        07/30/2020 11:01 /83 Sitting    67 - R  97.5 102lbs 16oz 4'  9\" 22.29 1.37 97 %          Physical Examination  · Constitutional  o Appearance  o : well-nourished, well developed, no obvious deformities present, impaired cognition. Patient uses a cane for assistance with ambulation.  · Eyes  o Vision  o : Patient wearing glasses.   · Respiratory  o Respiratory Effort  o : No labored breathing. Good respiratory effort.   · Cardiovascular  o Peripheral Vascular System  o :   § Pedal Pulses  § : Pedal Pulses are 2+ and symmetrical  § Extremities  § : There is no edema of the lower extremities  · Musculoskeletal  o Extremeties/Joint  o : Lower extremity muscle strength and range of motion is equal and symmetrical bilaterally. The knees are noted to be in normal alignment. Ankle alignment and range of motion is normal and foot structure is " normal.  · Skin and Subcutaneous Tissue  o General Inspection  o : no lesions present, no areas of discoloration, skin turgor normal, texture normal  · Neurologic  o Sensation  o : Sharp/dull sensation is within normal limits. [place]-Vanessa 5.07 monofilament intact to all assessed areas.   · Toes  o Toes: Right Foot  o :   § Toenails  § : Toenails are hypertrophic, mycotc, dystrophic, thickened, with sublingual debris, incurvated, brittle toenail(s) at nail-1, 2, 3, 4,, Oncholysis of the foot   o Toes: Left Foot  o :   § Toenails  § : Toenails are hypertrophic, mycotc, dystrophic, thickened, with sublingual debris, incurvated, brittle toenail(s) at nail-1, 2, 3, 4,, Onycholysis of the foot   · Procedures  o Nail Debridement  o : Nail debridement is indicated for the following toenails:-left hallux, left 2nd toe, left 3rd toe, left 4th toe, right hallux, right 2nd toe, right 3rd toe, right 4th toe,, The nail was debrided of excessive thickness to appropriate levels of comfort and contour using nail nippers. The procedure was without complications.          Assessment  · Foot pain, left     729.5/M79.672  · Foot pain, right     729.5/M79.671  · Ingrowing nail     703.0/L60.0  · Tinea unguium     110.1/B35.1  · Difficulty in walking     719.7/R26.2  · Ingrowing toenail     703.0/L60.0      Plan  · Orders  o Debridement of six or more nails (46448, 86810, 99288, 41269, 31237, 32541, 71324) - 729.5/M79.672, 729.5/M79.671, 110.1/B35.1, 719.7/R26.2 - 07/30/2020  · Medications  o Medications have been Reconciled  o Transition of Care or Provider Policy  · Instructions  o I have discussed the findings of this evaluation with the patient. The discussion included a complete verbal explanation of any changes in the examination results, diagnosis, and the current treatment plan. A schedule for future care needs was explained. If any questions should arise after returning home, I have encouraged the patient to feel free to  contact Dr. Fountain. The patient states understanding and agreement with this plan.   o Patient is to monitor for problems and to contact Dr. Fountain for follow-up should such signs occur. Patient states understanding and agreement with this plan.   o Encouraged to follow-up with Primary Care Provider for preventative care.   o Follow up in 9 weeks for Routine Foot Care.   o Electronically Identified Patient Education Materials Provided Electronically  · Disposition  o Call or Return if symptoms worsen or persist.            Electronically Signed by: Bertin Fountain DPM -Author on July 30, 2020 11:36:11 AM

## 2021-05-13 NOTE — PROGRESS NOTES
Progress Note      Patient Name: Carmita Devi   Patient ID: [id number]   Sex: Female   YOB: 1970    Primary Care Provider: Samantha Mcgowan PA-C   Referring Provider: Bertin Fountain DPM    Visit Date: May 28, 2020    Provider: Bertin Fountain DPM   Location: [facility]   Location Address: [address] [place] [address]  [id number]   Location Phone: [phone]          Chief Complaint  · Routine Foot Care Visit      History Of Present Illness  Carmita Devi complains of painful, elongated toenails which are thickened, yellowed, chalky, and cause pain with shoe gear and ambulation.      New, Established, New Problem:  Established   Location:  Toenails  Duration:   Greater than five years  Onset:  Gradual  Nature:  Sore with palpation.  Stable, worsening, improving:   stable  Aggravating factors:  Pain with shoe gear and ambulation.  Previous Treatment:  Debridement    Patient denies any fevers, chills, nausea, vomiting, nor any other constitutional signs nor symptoms.    Patient relates no medical changes since their last visit.       Past Medical History  Allergic rhinitis; Chronic frontal sinusitis; Chronic maxillary sinusitis; Corns and callus; Deviated septum; Difficulty in walking; Foot pain, left; Foot pain, right; GERD (gastroesophageal reflux disease); Headache; High blood pressure; Hoarseness; Ingrowing toenail; Mental retardation; Myofacial muscle pain; Otalgia; Tinea unguium         Past Surgical History  Sinus Surgery         Medication List  AmLactin 12 % topical lotion; amlodipine 5 mg oral tablet; ammonium lactate 12 % topical cream; baclofen 10 mg oral tablet; DermaZinc Spray 0.25 % topical spray,non-aerosol; fluticasone 50 mcg/actuation nasal spray,suspension; Prolia 60 mg/mL subcutaneous syringe; urea 40 % topical cream; Vitamin D3 2,000 unit oral capsule; xylitol sinus spray nasal spray         Allergy List  SULFA (SULFONAMIDES)  "      Allergies Reconciled  Family Medical History  No family history of colorectal cancer         Social History  Alcohol (Never); Tobacco (Never)         Review of Systems  · Constitutional  o Denies  o : fatigue, night sweats  · Eyes  o Denies  o : double vision, blurred vision  · HENT  o Denies  o : vertigo, recent head injury  · Cardiovascular  o Denies  o : chest pain, irregular heart beats  · Respiratory  o Denies  o : shortness of breath, productive cough  · Gastrointestinal  o Denies  o : nausea, vomiting  · Genitourinary  o Denies  o : dysuria, urinary retention  · Integument  o * See HPI  · Neurologic  o Denies  o : altered mental status, seizures  · Musculoskeletal  o Denies  o : joint swelling, limitation of motion  · Endocrine  o Denies  o : cold intolerance, heat intolerance  · Heme-Lymph  o Denies  o : petechiae, lymph node enlargement or tenderness  · Allergic-Immunologic  o Denies  o : frequent illnesses      Vitals  Date Time BP Position Site L\R Cuff Size HR RR TEMP (F) WT  HT  BMI kg/m2 BSA m2 O2 Sat        05/28/2020 10:54 /79 Sitting    82 - R   106lbs 8oz 4'  9\" 23.05 1.39 100 %          Physical Examination  · Constitutional  o Appearance  o : well-nourished, well developed, no obvious deformities present, impaired cognition. Patient uses a cane for assistance with ambulation.  · Eyes  o Vision  o : Patient wearing glasses.   · Respiratory  o Respiratory Effort  o : No labored breathing. Good respiratory effort.   · Cardiovascular  o Peripheral Vascular System  o :   § Pedal Pulses  § : Pedal Pulses are 2+ and symmetrical  § Extremities  § : There is no edema of the lower extremities  · Musculoskeletal  o Extremeties/Joint  o : Lower extremity muscle strength and range of motion is equal and symmetrical bilaterally. The knees are noted to be in normal alignment. Ankle alignment and range of motion is normal and foot structure is normal.  · Skin and Subcutaneous Tissue  o General " Inspection  o : no lesions present, no areas of discoloration, skin turgor normal, texture normal  · Neurologic  o Sensation  o : Sharp/dull sensation is within normal limits. [place]-Vanessa 5.07 monofilament intact to all assessed areas.   · Toes  o Toes: Right Foot  o :   § Toenails  § : Toenails are hypertrophic, mycotc, dystrophic, thickened, with sublingual debris, incurvated, brittle toenail(s) at nail-1, 2, 3, 4,, Oncholysis of the foot   o Toes: Left Foot  o :   § Toenails  § : Toenails are hypertrophic, mycotc, dystrophic, thickened, with sublingual debris, incurvated, brittle toenail(s) at nail-1, 2, 3, 4,, Onycholysis of the foot   · Procedures  o Nail Debridement  o : Nail debridement is indicated for the following toenails:-left hallux, left 2nd toe, left 3rd toe, left 4th toe, right hallux, right 2nd toe, right 3rd toe, right 4th toe,, The nail was debrided of excessive thickness to appropriate levels of comfort and contour using nail nippers. The procedure was without complications.          Assessment  · Foot pain, left     729.5/M79.672  · Foot pain, right     729.5/M79.671  · Ingrowing nail     703.0/L60.0  · Tinea unguium     110.1/B35.1  · Difficulty in walking     719.7/R26.2  · Ingrowing toenail     703.0/L60.0      Plan  · Orders  o Debridement of six or more nails (63988, 05529, 08085, 40655, 76887, 51388) - 729.5/M79.672, 729.5/M79.671, 110.1/B35.1, 719.7/R26.2 - 05/28/2020  · Medications  o Medications have been Reconciled  o Transition of Care or Provider Policy  · Instructions  o I have discussed the findings of this evaluation with the patient. The discussion included a complete verbal explanation of any changes in the examination results, diagnosis, and the current treatment plan. A schedule for future care needs was explained. If any questions should arise after returning home, I have encouraged the patient to feel free to contact Dr. Fountain. The patient states understanding and  agreement with this plan.   o Patient is to monitor for problems and to contact Dr. Fountain for follow-up should such signs occur. Patient states understanding and agreement with this plan.   o Encouraged to follow-up with Primary Care Provider for preventative care.   o Follow up in 9 weeks for Routine Foot Care.   o Electronically Identified Patient Education Materials Provided Electronically  · Disposition  o Call or Return if symptoms worsen or persist.            Electronically Signed by: Bertin Fountain DPM -Author on May 28, 2020 11:02:35 AM

## 2021-05-13 NOTE — PROGRESS NOTES
Progress Note      Patient Name: Carmita Devi   Patient ID: [id number]   Sex: Female   YOB: 1970    Primary Care Provider: Samantha Mcgowan PA-C   Referring Provider: Bertin Fountain DPM    Visit Date: August 25, 2020    Provider: Daphney Hurst MD   Location: [facility]   Location Address: [address] [place] [address], [address]  [place], [place]  [id number]   Location Phone: [phone]          Chief Complaint  · Follow up Elevated LFTs      History Of Present Illness     Ms. Devi is a 50 year old female with h/o developmental delay, HTN, GERD who presents for f/u of elevated liver enzymes.  Pt reports no changes since last visit.  Pt with no previously known liver disease or issues.  Elevated liver enzymes a recent issue.  No family history of liver disease.  No EtOH history.  No new medications.  Takes OTC probiotic but no other herbal supplements.  Since last visit, pt had liver workup with labs evaluating for viral causes, autoimmune causes, and genetic causes of liver disease that were negative.    Ultrasound abdomen (2/6/2020): Echogenicity and echotexture of the hepatic parenchyma is within normal limits.  No hepatic mass.  The intrahepatic bile ducts are normal in caliber.  Negative abdominal ultrasound.         Past Medical History  Allergic rhinitis; Chronic frontal sinusitis; Chronic maxillary sinusitis; Corns and callus; Deviated septum; Difficulty in walking; Foot pain, left; Foot pain, right; GERD (gastroesophageal reflux disease); Headache; High blood pressure; Hoarseness; Ingrowing toenail; Mental retardation; Myofacial muscle pain; Otalgia; Tinea unguium         Past Surgical History  Sinus Surgery         Medication List  AmLactin 12 % topical lotion; amlodipine 5 mg oral tablet; ammonium lactate 12 % topical cream; baclofen 10 mg oral tablet; DermaZinc Spray 0.25 % topical spray,non-aerosol; fluticasone 50 mcg/actuation nasal spray,suspension; Premarin 0.625  "mg/gram vaginal cream; Prolia 60 mg/mL subcutaneous syringe; Vitamin D3 2,000 unit oral capsule; xylitol sinus spray nasal spray         Allergy List  SULFA (SULFONAMIDES)       Allergies Reconciled  Family Medical History  No family history of colorectal cancer         Social History  Alcohol (Never); Tobacco (Never)         Review of Systems  · Constitutional  o Denies  o : chills, fever  · Cardiovascular  o Denies  o : chest pain  · Respiratory  o Denies  o : shortness of breath  · Gastrointestinal  o Admits  o : see HPI   · Endocrine  o Denies  o : weight gain, weight loss      Vitals  Date Time BP Position Site L\R Cuff Size HR RR TEMP (F) WT  HT  BMI kg/m2 BSA m2 O2 Sat HC       08/25/2020 02:40 /89 Sitting    90 - R  97.3 102lbs 16oz 4'  9\" 22.29 1.37           Physical Examination  · Constitutional  o Appearance  o : Healthy-appearing, awake and alert in no acute distress  · Head and Face  o Head  o : Normocephalic with no worriesome skin lesions  · Eyes  o Vision  o :   § Visual Fields  § : eyes move symmetrical in all directions  o Sclerae  o : sclerae anicteric  · Neck  o Inspection/Palpation  o : Trachea is midline, no adenopathy  · Respiratory  o Respiratory Effort  o : Breathing is unlabored.  o Inspection of Chest  o : normal appearance  o Auscultation of Lungs  o : Chest is clear to auscultation bilaterally.  · Cardiovascular  o Heart  o :   § Auscultation of Heart  § : no murmurs, rubs, or gallops  o Peripheral Vascular System  o :   § Extremities  § : no cyanosis, clubbing or edema;   · Gastrointestinal  o Abdominal Examination  o : Abdomen is soft, nontender to palpation, with normal active bowel sounds, no appreciable hepatosplenomegaly.          Assessment  · Elevated LFTs     790.6/R94.5    Problems Reconciled  Plan  · Medications  o Medications have been Reconciled  o Transition of Care or Provider Policy  · Instructions  o Elevated liver enzymes: given negative lab workup, discussed " liver biopsy to further evaluate etiology of liver enzyme elevation. Pt declines liver bx at this time. Pt would like to monitor labs at this time.  o Health maintenance: discussed screening colonoscopy or other non-invasive colon cancer screening method such as Cologuard. Pt declines colon cancer screening at this time.  · Disposition  o follow up in 5 months            Electronically Signed by: Daphney Hurst MD -Author on August 27, 2020 09:44:26 PM

## 2021-05-14 VITALS
HEART RATE: 86 BPM | DIASTOLIC BLOOD PRESSURE: 92 MMHG | SYSTOLIC BLOOD PRESSURE: 143 MMHG | WEIGHT: 108.37 LBS | BODY MASS INDEX: 23.38 KG/M2 | HEIGHT: 57 IN | TEMPERATURE: 96.1 F

## 2021-05-14 VITALS
DIASTOLIC BLOOD PRESSURE: 89 MMHG | WEIGHT: 103 LBS | SYSTOLIC BLOOD PRESSURE: 130 MMHG | BODY MASS INDEX: 22.22 KG/M2 | HEART RATE: 90 BPM | HEIGHT: 57 IN | TEMPERATURE: 97.3 F

## 2021-05-14 VITALS
DIASTOLIC BLOOD PRESSURE: 83 MMHG | HEART RATE: 82 BPM | OXYGEN SATURATION: 100 % | SYSTOLIC BLOOD PRESSURE: 129 MMHG | BODY MASS INDEX: 22.44 KG/M2 | HEIGHT: 57 IN | WEIGHT: 104 LBS | TEMPERATURE: 97.6 F

## 2021-05-14 VITALS
WEIGHT: 103 LBS | OXYGEN SATURATION: 100 % | TEMPERATURE: 97.1 F | DIASTOLIC BLOOD PRESSURE: 87 MMHG | HEART RATE: 74 BPM | SYSTOLIC BLOOD PRESSURE: 134 MMHG | HEIGHT: 57 IN | BODY MASS INDEX: 22.22 KG/M2

## 2021-05-14 VITALS
HEIGHT: 57 IN | TEMPERATURE: 97.1 F | WEIGHT: 108.12 LBS | DIASTOLIC BLOOD PRESSURE: 87 MMHG | HEART RATE: 71 BPM | BODY MASS INDEX: 23.32 KG/M2 | SYSTOLIC BLOOD PRESSURE: 142 MMHG | OXYGEN SATURATION: 100 %

## 2021-05-14 NOTE — PROGRESS NOTES
Progress Note      Patient Name: Carmita Devi   Patient ID: [id number]   Sex: Female   YOB: 1970    Primary Care Provider: Samantha Mcgowan PA-C   Referring Provider: Bertin Fountain DPM    Visit Date: December 15, 2020    Provider: Bertin Fountain DPM   Location: [facility]   Location Address: [address] [place] [address]  [id number]   Location Phone: [phone]          Chief Complaint  · Routine Foot Care Visit      History Of Present Illness  Carmita Devi complains of painful, elongated toenails which are thickened, yellowed, chalky, and cause pain with shoe gear and ambulation.      New, Established, New Problem:  Established   Location:  Toenails  Duration:   Greater than five years  Onset:  Gradual  Nature:  Sore with palpation.  Stable, worsening, improving:   stable  Aggravating factors:  Pain with shoe gear and ambulation.  Previous Treatment:  Debridement    Patient denies any fevers, chills, nausea, vomiting, nor any other constitutional signs nor symptoms.    Patient reports that no changes in their medications with their recent appointment with their primary care provider.       Past Medical History  Allergic rhinitis; Chronic frontal sinusitis; Chronic maxillary sinusitis; Corns and callus; Deviated septum; Difficulty in walking; Foot pain, left; Foot pain, right; GERD (gastroesophageal reflux disease); Headache; High blood pressure; Hoarseness; Ingrowing toenail; Mental retardation; Myofacial muscle pain; Otalgia; Tinea unguium         Past Surgical History  Sinus Surgery         Medication List  AmLactin 12 % topical lotion; amlodipine 5 mg oral tablet; ammonium lactate 12 % topical cream; baclofen 10 mg oral tablet; DermaZinc Spray 0.25 % topical spray,non-aerosol; fluticasone 50 mcg/actuation nasal spray,suspension; Premarin 0.625 mg/gram vaginal cream; Prolia 60 mg/mL subcutaneous syringe; Vitamin D3 2,000 unit oral capsule; xylitol sinus spray  "nasal spray         Allergy List  SULFA (SULFONAMIDES)       Allergies Reconciled  Family Medical History  No family history of colorectal cancer         Social History  Alcohol (Never); Tobacco (Never)         Review of Systems  · Constitutional  o Denies  o : fatigue, night sweats  · Eyes  o Denies  o : double vision, blurred vision  · HENT  o Denies  o : vertigo, recent head injury  · Cardiovascular  o Denies  o : chest pain, irregular heart beats  · Respiratory  o Denies  o : shortness of breath, productive cough  · Gastrointestinal  o Denies  o : nausea, vomiting  · Genitourinary  o Denies  o : dysuria, urinary retention  · Integument  o * See HPI  · Neurologic  o Denies  o : altered mental status, seizures  · Musculoskeletal  o Denies  o : joint swelling, limitation of motion  · Endocrine  o Denies  o : cold intolerance, heat intolerance  · Heme-Lymph  o Denies  o : petechiae, lymph node enlargement or tenderness  · Allergic-Immunologic  o Denies  o : frequent illnesses      Vitals  Date Time BP Position Site L\R Cuff Size HR RR TEMP (F) WT  HT  BMI kg/m2 BSA m2 O2 Sat FR L/min FiO2        12/15/2020 02:12 /87 Sitting    74 - R  97.1 102lbs 16oz 4'  9\" 22.29 1.37 100 %            Physical Examination  · Constitutional  o Appearance  o : well-nourished, well developed, no obvious deformities present, impaired cognition. Patient uses a cane for assistance with ambulation.  · Eyes  o Vision  o : Patient wearing glasses.   · Respiratory  o Respiratory Effort  o : No labored breathing. Good respiratory effort.   · Cardiovascular  o Peripheral Vascular System  o :   § Pedal Pulses  § : Pedal Pulses are 2+ and symmetrical  § Extremities  § : There is no edema of the lower extremities  · Musculoskeletal  o Extremeties/Joint  o : Lower extremity muscle strength and range of motion is equal and symmetrical bilaterally. The knees are noted to be in normal alignment. Ankle alignment and range of motion is normal " and foot structure is normal.  · Skin and Subcutaneous Tissue  o General Inspection  o : no lesions present, no areas of discoloration, skin turgor normal, texture normal  · Neurologic  o Sensation  o : Sharp/dull sensation is within normal limits. [place]-Vanessa 5.07 monofilament intact to all assessed areas.   · Toes  o Toes: Right Foot  o :   § Toenails  § : Toenails are hypertrophic, mycotc, dystrophic, thickened, with sublingual debris, incurvated, brittle toenail(s) at nail-1, 2, 3, 4,, Oncholysis of the foot   o Toes: Left Foot  o :   § Toenails  § : Toenails are hypertrophic, mycotc, dystrophic, thickened, with sublingual debris, incurvated, brittle toenail(s) at nail-1, 2, 3, 4,, Onycholysis of the foot   · Procedures  o Nail Debridement  o : Nail debridement is indicated for the following toenails:-left hallux, left 2nd toe, left 3rd toe, left 4th toe, right hallux, right 2nd toe, right 3rd toe, right 4th toe,, The nail was debrided of excessive thickness to appropriate levels of comfort and contour using nail nippers. The procedure was without complications.          Assessment  · Foot pain, left     729.5/M79.672  · Foot pain, right     729.5/M79.671  · Ingrowing nail     703.0/L60.0  · Tinea unguium     110.1/B35.1  · Difficulty in walking     719.7/R26.2  · Ingrowing toenail     703.0/L60.0      Plan  · Orders  o Debridement of six or more nails (76110, 33032, 51779, 19183, 83371, 89135, 76147, 01038, 83651) - 729.5/M79.672, 729.5/M79.671, 110.1/B35.1, 719.7/R26.2 - 12/15/2020  · Medications  o Medications have been Reconciled  o Transition of Care or Provider Policy  · Instructions  o I have discussed the findings of this evaluation with the patient. The discussion included a complete verbal explanation of any changes in the examination results, diagnosis, and the current treatment plan. A schedule for future care needs was explained. If any questions should arise after returning home, I have  encouraged the patient to feel free to contact Dr. Fountain. The patient states understanding and agreement with this plan.   o Patient is to monitor for problems and to contact Dr. Fountain for follow-up should such signs occur. Patient states understanding and agreement with this plan.   o Encouraged to follow-up with Primary Care Provider for preventative care.   o Follow up in 9 weeks for Routine Foot Care.   o Electronically Identified Patient Education Materials Provided Electronically  · Disposition  o Call or Return if symptoms worsen or persist.            Electronically Signed by: Bertin Fountain DPM -Author on December 15, 2020 02:20:07 PM

## 2021-05-14 NOTE — PROGRESS NOTES
Progress Note      Patient Name: Carmita Devi   Patient ID: [id number]   Sex: Female   YOB: 1970    Primary Care Provider: Samantha Mcgowan PA-C   Referring Provider: Bertin Fountain DPM    Visit Date: March 9, 2021    Provider: Bertin Fountain DPM   Location[facility]   Location Address: [address] [place] [address]  [id number]   Location Phone: [phone]          Chief Complaint  · Routine Foot Care Visit      History Of Present Illness  Carmita Devi complains of painful, elongated toenails which are thickened, yellowed, chalky, and cause pain with shoe gear and ambulation.      New, Established, New Problem:  Established   Location:  Toenails  Duration:   Greater than five years  Onset:  Gradual  Nature:  Sore with palpation.  Stable, worsening, improving:   stable  Aggravating factors:  Pain with shoe gear and ambulation.  Previous Treatment:  Debridement    Patient denies any fevers, chills, nausea, vomiting, nor any other constitutional signs nor symptoms.    Patient reports that no changes in their medications with their recent appointment with their primary care provider.    Patient states she like a prescription for an AFO for her left foot due to dropfoot.    Patient states improvement in balance with physical therapy.       Review of Systems  · Constitutional  o Denies  o : fatigue, night sweats  · Eyes  o Denies  o : double vision, blurred vision  · HENT  o Denies  o : vertigo, recent head injury  · Cardiovascular  o Denies  o : chest pain, irregular heart beats  · Respiratory  o Denies  o : shortness of breath, productive cough  · Gastrointestinal  o Denies  o : nausea, vomiting  · Genitourinary  o Denies  o : dysuria, urinary retention  · Integument  o * See HPI  · Neurologic  o Denies  o : altered mental status, seizures  · Musculoskeletal  o Denies  o : joint swelling, limitation of motion  · Endocrine  o Denies  o : cold intolerance, heat  "intolerance  · Heme-Lymph  o Denies  o : petechiae, lymph node enlargement or tenderness  · Allergic-Immunologic  o Denies  o : frequent illnesses      Vitals  Date Time BP Position Site L\R Cuff Size HR RR TEMP (F) WT  HT  BMI kg/m2 BSA m2 O2 Sat FR L/min FiO2 HC       03/09/2021 02:09 /87 Sitting    71 - R  97.1 108lbs 2oz 4'  9\" 23.4 1.4 100 %            Physical Examination  · Constitutional  o Appearance  o : well-nourished, well developed, no obvious deformities present, impaired cognition. Patient uses a cane for assistance with ambulation.  · Eyes  o Vision  o : Patient wearing glasses.   · Respiratory  o Respiratory Effort  o : No labored breathing. Good respiratory effort.   · Cardiovascular  o Peripheral Vascular System  o :   § Pedal Pulses  § : Pedal Pulses are 2+ and symmetrical  § Extremities  § : There is no edema of the lower extremities  · Musculoskeletal  o Extremeties/Joint  o : Lower extremity muscle strength and range of motion is equal and symmetrical bilaterally. The knees are noted to be in normal alignment. The left side shows 0/5 in the anterior muscle group and 1/5 in the lateral muscle groups in the lower leg. Otherwise, 5/5 muscle strength in all other muscle and tendon insertions in the foot and ankle. The right side shows 5/5 muscle strength in all other muscle and tendon insertions in the foot and ankle.   · Skin and Subcutaneous Tissue  o General Inspection  o : no lesions present, no areas of discoloration, skin turgor normal, texture normal  · Neurologic  o Sensation  o : Sharp/dull sensation is within normal limits. [place]-Vanessa 5.07 monofilament intact to all assessed areas.   · Toes  o Toes: Right Foot  o :   § Toenails  § : Toenails are hypertrophic, mycotc, dystrophic, thickened, with sublingual debris, incurvated, brittle toenail(s) at nail-1, 2, 3, 4,, Oncholysis of the foot   o Toes: Left Foot  o :   § Toenails  § : Toenails are hypertrophic, mycotc, dystrophic, " thickened, with sublingual debris, incurvated, brittle toenail(s) at nail-1, 2, 3, 4,, Onycholysis of the foot   · Procedures  o Nail Debridement  o : Nail debridement is indicated for the following toenails:-left hallux, left 2nd toe, left 3rd toe, left 4th toe, right hallux, right 2nd toe, right 3rd toe, right 4th toe,, The nail was debrided of excessive thickness to appropriate levels of comfort and contour using nail nippers. The procedure was without complications.              Assessment  · Foot pain, left     729.5/M79.672  · Foot pain, right     729.5/M79.671  · Ingrowing nail     703.0/L60.0  · Tinea unguium     110.1/B35.1  · Difficulty in walking     719.7/R26.2  · Ingrowing toenail     703.0/L60.0  · Drop foot gait     736.79/M21.379      Plan  · Orders  o Debridement of six or more nails (69403, 25817, 32908, 48232, 42185, 83352, 30036, 55757, 98713, 36099) - 729.5/M79.672, 729.5/M79.671, 110.1/B35.1, 719.7/R26.2 - 03/09/2021  · Medications  o Ankle-Foot Orthotic   SIG: Please fabicate a custom AFO for Left foot   DISP: (1) with 0 refills  Prescribed on 03/09/2021     o Medications have been Reconciled  o Transition of Care or Provider Policy  · Instructions  o I have discussed the findings of this evaluation with the patient. The discussion included a complete verbal explanation of any changes in the examination results, diagnosis, and the current treatment plan. A schedule for future care needs was explained. If any questions should arise after returning home, I have encouraged the patient to feel free to contact Dr. Fountain. The patient states understanding and agreement with this plan.   o Patient is to monitor for problems and to contact Dr. Fountain for follow-up should such signs occur. Patient states understanding and agreement with this plan.   o Encouraged to follow-up with Primary Care Provider for preventative care.   o Follow up in 9 weeks for Routine Foot Care.   o Electronically  Identified Patient Education Materials Provided Electronically            Electronically Signed by: Bertin Fountain DPM -Author on March 9, 2021 02:29:58 PM

## 2021-05-14 NOTE — PROGRESS NOTES
Progress Note      Patient Name: Carmita Devi   Patient ID: [id number]   Sex: Female   YOB: 1970    Primary Care Provider: Samantha Mcgowan PA-C   Referring Provider: Bertin Fountain DPM    Visit Date: January 26, 2021    Provider: JACKSON Gray   Location: [facility]   Location Address: [address] [place] [address], [address]  [place], [place]  [id number]   Location Phone: [phone]          Chief Complaint  · Follow up Elevated LFTs      History Of Present Illness     Ms. Devi is a 51 y/o female with developmental delay who presents for follow-up of elevated liver enzymes.  She has a family member present who helps provide history.  She had previously discussed colon cancer screening and liver biopsy with Dr. Hurst and was not interested in completing either of these.  She has also discussed this with PCP and reports that they were in agreement to just watch liver enzymes.      She denies abdominal pain. Reports a good appetite.      Reports a regular bowel movement.  Denies rectal bleeding.    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  AMA-negative  Ceruloplasmin 26.5  Baez fibrosure: S0, F1  IgG-1042, IgA-79, IgM-68, no monoclonality detected.  ASMA-negative  KATTY-equivocal  Acute hepatitis panel-negative  CMP: Creatinine 0.68, alk phos 163, ALT 42, AST 55, total bilirubin 0.88.  Hemoglobin 16.1, hematocrit 44.8, platelets 351.  TSH 3.05  Ferritin 104  Iron profile: Iron saturation 43%, total iron 188.                              Past Medical History  Allergic rhinitis; Chronic frontal sinusitis; Chronic maxillary sinusitis; Corns and callus; Deviated septum; Difficulty in walking; Foot pain, left; Foot pain, right; GERD (gastroesophageal reflux disease); Headache; High blood pressure; Hoarseness; Ingrowing toenail; Mental retardation; Myofacial muscle pain; Otalgia; Tinea unguium         Past Surgical History  Sinus Surgery         Medication List  amlodipine 5  "mg oral tablet; baclofen 10 mg oral tablet; DermaZinc Spray 0.25 % topical spray,non-aerosol; fluticasone 50 mcg/actuation nasal spray,suspension; omeprazole 20 mg oral capsule,delayed release(DR/EC); Prolia 60 mg/mL subcutaneous syringe; Vitamin D3 2,000 unit oral capsule         Allergy List  SULFA (SULFONAMIDES)       Allergies Reconciled  Family Medical History  No family history of colorectal cancer         Social History  Alcohol (Never); Tobacco (Never)         Review of Systems  · Constitutional  o Denies  o : chills, fever  · Cardiovascular  o Denies  o : chest pain, irregular heart beats  · Respiratory  o Denies  o : cough, shortness of breath  · Gastrointestinal  o Admits  o : see HPI   · Endocrine  o Denies  o : weight gain, weight loss      Vitals  Date Time BP Position Site L\R Cuff Size HR RR TEMP (F) WT  HT  BMI kg/m2 BSA m2 O2 Sat FR L/min FiO2 HC       01/26/2021 03:18 /92 Sitting    86 - R  96.1 108lbs 6oz 4'  9\" 23.45 1.41             Physical Examination  · Constitutional  o Appearance  o : Healthy-appearing, awake and alert in no acute distress  · Head and Face  o Head  o : Normocephalic with no worriesome skin lesions  · Eyes  o Vision  o :   § Visual Fields  § : eyes move symmetrical in all directions  o Sclerae  o : sclerae anicteric  o Pupils and Irises  o : pupils equal and symmetrical  · Neck  o Inspection/Palpation  o : Trachea is midline, no adenopathy  · Respiratory  o Respiratory Effort  o : Breathing is unlabored.  o Inspection of Chest  o : normal appearance  o Auscultation of Lungs  o : Chest is clear to auscultation bilaterally.  · Cardiovascular  o Heart  o :   § Auscultation of Heart  § : no murmurs, rubs, or gallops  o Peripheral Vascular System  o :   § Extremities  § : no cyanosis, clubbing or edema;   · Gastrointestinal  o Abdominal Examination  o : Abdomen is soft, nontender to palpation, with normal active bowel sounds, no appreciable hepatosplenomegaly.  o Digital " Rectal Exam  o : deferred  · Skin and Subcutaneous Tissue  o General Inspection  o : without focal lesions; turgor is normal  · Psychiatric  o General  o : Alert and oriented x3  o Mood and Affect  o : Mood and affect are appropriate to circumstances  · Extremities  o Extremities  o : No edema, no cyanosis          Assessment  · Elevated LFTs     790.6/R94.5      Plan  · Orders  o [facility] ([id number]) - - 02/26/2021  · Medications  o Medications have been Reconciled  · Instructions  o Information given on current diagnoses.  · Disposition  o Follow up 6 months            Electronically Signed by: JACKSON Gray -Author on January 28, 2021 01:31:32 PM

## 2021-05-15 VITALS
HEIGHT: 57 IN | HEART RATE: 78 BPM | BODY MASS INDEX: 23.3 KG/M2 | DIASTOLIC BLOOD PRESSURE: 86 MMHG | WEIGHT: 108 LBS | OXYGEN SATURATION: 100 % | SYSTOLIC BLOOD PRESSURE: 132 MMHG

## 2021-05-15 VITALS
BODY MASS INDEX: 23.95 KG/M2 | WEIGHT: 111 LBS | HEIGHT: 57 IN | HEART RATE: 75 BPM | OXYGEN SATURATION: 100 % | SYSTOLIC BLOOD PRESSURE: 124 MMHG | DIASTOLIC BLOOD PRESSURE: 85 MMHG

## 2021-05-15 VITALS
OXYGEN SATURATION: 99 % | SYSTOLIC BLOOD PRESSURE: 128 MMHG | WEIGHT: 102 LBS | BODY MASS INDEX: 22.01 KG/M2 | DIASTOLIC BLOOD PRESSURE: 83 MMHG | HEIGHT: 57 IN | HEART RATE: 85 BPM

## 2021-05-15 VITALS
HEART RATE: 84 BPM | OXYGEN SATURATION: 100 % | WEIGHT: 108 LBS | DIASTOLIC BLOOD PRESSURE: 80 MMHG | SYSTOLIC BLOOD PRESSURE: 133 MMHG | HEIGHT: 57 IN | BODY MASS INDEX: 23.3 KG/M2

## 2021-05-15 VITALS
BODY MASS INDEX: 22.87 KG/M2 | OXYGEN SATURATION: 100 % | SYSTOLIC BLOOD PRESSURE: 135 MMHG | WEIGHT: 106 LBS | DIASTOLIC BLOOD PRESSURE: 85 MMHG | HEART RATE: 73 BPM | HEIGHT: 57 IN

## 2021-05-15 VITALS
DIASTOLIC BLOOD PRESSURE: 83 MMHG | WEIGHT: 106 LBS | BODY MASS INDEX: 22.87 KG/M2 | SYSTOLIC BLOOD PRESSURE: 123 MMHG | OXYGEN SATURATION: 100 % | HEIGHT: 57 IN | HEART RATE: 86 BPM

## 2021-05-15 VITALS
SYSTOLIC BLOOD PRESSURE: 137 MMHG | BODY MASS INDEX: 23.08 KG/M2 | OXYGEN SATURATION: 93 % | DIASTOLIC BLOOD PRESSURE: 83 MMHG | HEART RATE: 84 BPM | WEIGHT: 107 LBS | HEIGHT: 57 IN

## 2021-05-15 VITALS
SYSTOLIC BLOOD PRESSURE: 127 MMHG | HEIGHT: 57 IN | OXYGEN SATURATION: 97 % | WEIGHT: 103 LBS | BODY MASS INDEX: 22.22 KG/M2 | TEMPERATURE: 97.5 F | HEART RATE: 67 BPM | DIASTOLIC BLOOD PRESSURE: 83 MMHG

## 2021-05-15 VITALS — WEIGHT: 107 LBS | BODY MASS INDEX: 23.08 KG/M2 | HEART RATE: 93 BPM | HEIGHT: 57 IN

## 2021-05-15 VITALS
HEIGHT: 57 IN | SYSTOLIC BLOOD PRESSURE: 135 MMHG | BODY MASS INDEX: 22.98 KG/M2 | WEIGHT: 106.5 LBS | OXYGEN SATURATION: 100 % | DIASTOLIC BLOOD PRESSURE: 79 MMHG | HEART RATE: 82 BPM

## 2021-05-15 VITALS — BODY MASS INDEX: 22.01 KG/M2 | HEIGHT: 57 IN | WEIGHT: 102 LBS

## 2021-05-16 VITALS
BODY MASS INDEX: 23.08 KG/M2 | WEIGHT: 107 LBS | HEART RATE: 94 BPM | HEIGHT: 57 IN | SYSTOLIC BLOOD PRESSURE: 147 MMHG | DIASTOLIC BLOOD PRESSURE: 90 MMHG | OXYGEN SATURATION: 98 %

## 2021-05-16 VITALS — OXYGEN SATURATION: 98 % | WEIGHT: 106 LBS | HEIGHT: 57 IN | BODY MASS INDEX: 22.87 KG/M2 | HEART RATE: 85 BPM

## 2021-05-16 VITALS
HEART RATE: 87 BPM | WEIGHT: 105 LBS | HEIGHT: 57 IN | BODY MASS INDEX: 22.65 KG/M2 | SYSTOLIC BLOOD PRESSURE: 136 MMHG | DIASTOLIC BLOOD PRESSURE: 90 MMHG | OXYGEN SATURATION: 99 %

## 2021-05-16 VITALS — HEART RATE: 95 BPM | WEIGHT: 104 LBS | HEIGHT: 57 IN | BODY MASS INDEX: 22.44 KG/M2 | OXYGEN SATURATION: 98 %

## 2021-05-16 VITALS — BODY MASS INDEX: 22.87 KG/M2 | WEIGHT: 106 LBS | HEART RATE: 95 BPM | OXYGEN SATURATION: 98 % | HEIGHT: 57 IN

## 2021-05-17 ENCOUNTER — PROCEDURE VISIT CONVERTED (OUTPATIENT)
Dept: PODIATRY | Facility: CLINIC | Age: 51
End: 2021-05-17
Attending: PODIATRIST

## 2021-05-19 ENCOUNTER — HOSPITAL ENCOUNTER (OUTPATIENT)
Dept: CARDIOLOGY | Facility: HOSPITAL | Age: 51
Discharge: HOME | End: 2021-05-19
Attending: INTERNAL MEDICINE

## 2021-06-05 NOTE — PROGRESS NOTES
Progress Note      Patient Name: Carmita Devi   Patient ID: [id number]   Sex: Female   YOB: 1970    Primary Care Provider: Samantha Mcgowan PA-C   Referring Provider: Bertin Fountain DPM    Visit Date: May 17, 2021    Provider: Bertin Fountain DPM   Location: [facility]   Location Address: [address] [place] [address]  [id number]   Location Phone: [phone]          Chief Complaint  · Routine Foot Care Visit      History Of Present Illness  Carmita Devi complains of painful, elongated toenails which are thickened, yellowed, chalky, and cause pain with shoe gear and ambulation.      New, Established, New Problem:  Established   Location:  Toenails  Duration:   Greater than five years  Onset:  Gradual  Nature:  Sore with palpation.  Stable, worsening, improving:   stable  Aggravating factors:  Pain with shoe gear and ambulation.  Previous Treatment:  Debridement    Patient denies any fevers, chills, nausea, vomiting, nor any other constitutional signs nor symptoms.    Patient relates no medical changes since their last visit.       Past Medical History  Allergic rhinitis; Chronic frontal sinusitis; Chronic maxillary sinusitis; Corns and callus; Deviated septum; Difficulty in walking; Foot pain, left; Foot pain, right; GERD (gastroesophageal reflux disease); Headache; High blood pressure; Hoarseness; Ingrowing toenail; Mental retardation; Myofacial muscle pain; Otalgia; Tinea unguium         Past Surgical History  Sinus Surgery         Medication List  amlodipine 5 mg oral tablet; Ankle-Foot Orthotic; baclofen 10 mg oral tablet; DermaZinc Spray 0.25 % topical spray,non-aerosol; fluticasone 50 mcg/actuation nasal spray,suspension; omeprazole 20 mg oral capsule,delayed release(DR/EC); Prolia 60 mg/mL subcutaneous syringe; Vitamin D3 2,000 unit oral capsule         Allergy List  SULFA (SULFONAMIDES)       Allergies Reconciled  Family Medical History  No family history  "of colorectal cancer         Social History  Alcohol (Never); Tobacco (Never)         Review of Systems  · Constitutional  o Denies  o : fatigue, night sweats  · Eyes  o Denies  o : double vision, blurred vision  · HENT  o Denies  o : vertigo, recent head injury  · Cardiovascular  o Denies  o : chest pain, irregular heart beats  · Respiratory  o Denies  o : shortness of breath, productive cough  · Gastrointestinal  o Denies  o : nausea, vomiting  · Genitourinary  o Denies  o : dysuria, urinary retention  · Integument  o * See HPI  · Neurologic  o Denies  o : altered mental status, seizures  · Musculoskeletal  o Denies  o : joint swelling, limitation of motion  · Endocrine  o Denies  o : cold intolerance, heat intolerance  · Heme-Lymph  o Denies  o : petechiae, lymph node enlargement or tenderness  · Allergic-Immunologic  o Denies  o : frequent illnesses      Vitals  Date Time BP Position Site L\R Cuff Size HR RR TEMP (F) WT  HT  BMI kg/m2 BSA m2 O2 Sat FR L/min FiO2        05/17/2021 01:58 /80 Sitting    87 - R  97.3 106lbs 0oz 4'  9\" 22.94 1.39 100 %            Physical Examination  · Constitutional  o Appearance  o : well-nourished, well developed, no obvious deformities present, impaired cognition. Patient uses a cane for assistance with ambulation.  · Eyes  o Vision  o : Patient wearing glasses.   · Respiratory  o Respiratory Effort  o : No labored breathing. Good respiratory effort.   · Cardiovascular  o Peripheral Vascular System  o :   § Pedal Pulses  § : Pedal Pulses are 2+ and symmetrical  § Extremities  § : There is no edema of the lower extremities  · Musculoskeletal  o Extremeties/Joint  o : Lower extremity muscle strength and range of motion is equal and symmetrical bilaterally. The knees are noted to be in normal alignment. The left side shows 0/5 in the anterior muscle group and 1/5 in the lateral muscle groups in the lower leg. Otherwise, 5/5 muscle strength in all other muscle and tendon " insertions in the foot and ankle. The right side shows 5/5 muscle strength in all other muscle and tendon insertions in the foot and ankle.   · Skin and Subcutaneous Tissue  o General Inspection  o : no lesions present, no areas of discoloration, skin turgor normal, texture normal  · Neurologic  o Sensation  o : Sharp/dull sensation is within normal limits. [place] 5.07 monofilament intact to all assessed areas.   · Toes  o Toes: Right Foot  o :   § Toenails  § : Toenails are hypertrophic, mycotc, dystrophic, thickened, with sublingual debris, incurvated, brittle toenail(s) at nail-1, 2, 3, 4,, Oncholysis of the foot   o Toes: Left Foot  o :   § Toenails  § : Toenails are hypertrophic, mycotc, dystrophic, thickened, with sublingual debris, incurvated, brittle toenail(s) at nail-1, 2, 3, 4,, Onycholysis of the foot   · Procedures  o Nail Debridement  o : Nail debridement is indicated for the following toenails:-left hallux, left 2nd toe, left 3rd toe, left 4th toe, right hallux, right 2nd toe, right 3rd toe, right 4th toe,, The nail was debrided of excessive thickness to appropriate levels of comfort and contour using nail nippers. The procedure was without complications.          Assessment  · Foot pain, left     729.5/M79.672  · Foot pain, right     729.5/M79.671  · Ingrowing nail     703.0/L60.0  · Tinea unguium     110.1/B35.1  · Difficulty in walking     719.7/R26.2  · Ingrowing toenail     703.0/L60.0  · Drop foot gait     736.79/M21.379      Plan  · Orders  o Debridement of six or more nails (64106, 72241, 88603, 69912, 58353, 99331, 96967, 65512, 50248, 99136, 56440) - 729.5/M79.672, 729.5/M79.671, 110.1/B35.1, 719.7/R26.2 - 05/17/2021  · Medications  o Medications have been Reconciled  o Transition of Care or Provider Policy  · Instructions  o I have discussed the findings of this evaluation with the patient. The discussion included a complete verbal explanation of any changes in the examination  results, diagnosis, and the current treatment plan. A schedule for future care needs was explained. If any questions should arise after returning home, I have encouraged the patient to feel free to contact Dr. Fountain. The patient states understanding and agreement with this plan.   o Patient is to monitor for problems and to contact Dr. Fountain for follow-up should such signs occur. Patient states understanding and agreement with this plan.   o Encouraged to follow-up with Primary Care Provider for preventative care.   o Follow up in 9 weeks for Routine Foot Care.   o Electronically Identified Patient Education Materials Provided Electronically  · Disposition  o Call or Return if symptoms worsen or persist.            Electronically Signed by: Bertin Fountain DPM -Author on May 17, 2021 02:04:48 PM

## 2021-06-22 PROBLEM — L60.0 INGROWING TOENAIL: Status: ACTIVE | Noted: 2021-06-22

## 2021-06-22 PROBLEM — K21.9 GERD (GASTROESOPHAGEAL REFLUX DISEASE): Status: ACTIVE | Noted: 2021-06-22

## 2021-06-22 PROBLEM — R26.2 DISABILITY OF WALKING: Status: ACTIVE | Noted: 2021-06-22

## 2021-06-22 PROBLEM — J34.2 DEVIATED SEPTUM: Status: ACTIVE | Noted: 2021-06-22

## 2021-06-22 PROBLEM — J32.1 CHRONIC FRONTAL SINUSITIS: Status: ACTIVE | Noted: 2021-06-22

## 2021-06-22 PROBLEM — R49.0 HOARSENESS: Status: ACTIVE | Noted: 2021-06-22

## 2021-06-22 PROBLEM — M79.672 FOOT PAIN, LEFT: Status: ACTIVE | Noted: 2021-06-22

## 2021-06-22 PROBLEM — I10 HIGH BLOOD PRESSURE: Status: ACTIVE | Noted: 2021-06-22

## 2021-06-22 PROBLEM — J32.0 CHRONIC MAXILLARY SINUSITIS: Status: ACTIVE | Noted: 2021-06-22

## 2021-06-22 PROBLEM — H92.09 OTALGIA: Status: ACTIVE | Noted: 2021-06-22

## 2021-06-22 PROBLEM — B35.1 TINEA UNGUIUM: Status: ACTIVE | Noted: 2021-06-22

## 2021-06-22 PROBLEM — M79.671 FOOT PAIN, RIGHT: Status: ACTIVE | Noted: 2021-06-22

## 2021-06-22 PROBLEM — J30.9 ALLERGIC RHINITIS: Status: ACTIVE | Noted: 2021-06-22

## 2021-06-22 PROBLEM — L84 CORNS AND CALLOSITY: Status: ACTIVE | Noted: 2021-06-22

## 2021-06-22 PROBLEM — M79.18 MYOFASCIAL MUSCLE PAIN: Status: ACTIVE | Noted: 2021-06-22

## 2021-06-22 PROBLEM — F79 INTELLECTUAL DISABILITY: Status: ACTIVE | Noted: 2021-06-22

## 2021-06-22 PROBLEM — R51.9 HEADACHE: Status: ACTIVE | Noted: 2021-06-22

## 2021-06-22 RX ORDER — DENOSUMAB 60 MG/ML
1 INJECTION SUBCUTANEOUS
COMMUNITY

## 2021-06-22 RX ORDER — MULTIPLE VITAMINS W/ MINERALS TAB 9MG-400MCG
TAB ORAL
COMMUNITY
End: 2022-10-24 | Stop reason: ALTCHOICE

## 2021-06-22 RX ORDER — AMLODIPINE BESYLATE 5 MG/1
TABLET ORAL
COMMUNITY
End: 2022-10-24 | Stop reason: ALTCHOICE

## 2021-06-22 RX ORDER — FLUTICASONE PROPIONATE 50 MCG
SPRAY, SUSPENSION (ML) NASAL
COMMUNITY

## 2021-06-22 RX ORDER — ESOMEPRAZOLE MAGNESIUM 40 MG/1
CAPSULE, DELAYED RELEASE ORAL
COMMUNITY
Start: 2021-04-13 | End: 2021-08-09 | Stop reason: ALTCHOICE

## 2021-06-22 RX ORDER — OMEPRAZOLE 20 MG/1
CAPSULE, DELAYED RELEASE ORAL
COMMUNITY
End: 2021-08-09 | Stop reason: ALTCHOICE

## 2021-06-22 RX ORDER — IBUPROFEN 200 MG
CAPSULE ORAL
COMMUNITY
End: 2022-10-24 | Stop reason: ALTCHOICE

## 2021-06-22 RX ORDER — AZELASTINE HCL 205.5 UG/1
SPRAY NASAL
COMMUNITY
End: 2022-10-24 | Stop reason: ALTCHOICE

## 2021-06-22 RX ORDER — BACLOFEN 10 MG/1
TABLET ORAL
COMMUNITY

## 2021-06-22 RX ORDER — IBUPROFEN 600 MG/1
TABLET ORAL
COMMUNITY
End: 2022-10-24 | Stop reason: ALTCHOICE

## 2021-06-22 RX ORDER — CONJUGATED ESTROGENS 0.62 MG/G
0.5 CREAM VAGINAL
COMMUNITY
End: 2022-10-24 | Stop reason: ALTCHOICE

## 2021-06-22 RX ORDER — BUPROPION HYDROCHLORIDE 100 MG/1
TABLET ORAL
COMMUNITY
End: 2022-10-24 | Stop reason: ALTCHOICE

## 2021-06-22 RX ORDER — ASPIRIN 81 MG/1
TABLET ORAL
COMMUNITY
End: 2022-10-24 | Stop reason: ALTCHOICE

## 2021-06-23 ENCOUNTER — APPOINTMENT (OUTPATIENT)
Dept: ONCOLOGY | Facility: HOSPITAL | Age: 51
End: 2021-06-23

## 2021-06-23 ENCOUNTER — CONSULT (OUTPATIENT)
Dept: ONCOLOGY | Facility: HOSPITAL | Age: 51
End: 2021-06-23

## 2021-06-23 VITALS
SYSTOLIC BLOOD PRESSURE: 138 MMHG | BODY MASS INDEX: 23.35 KG/M2 | OXYGEN SATURATION: 100 % | DIASTOLIC BLOOD PRESSURE: 80 MMHG | RESPIRATION RATE: 18 BRPM | HEART RATE: 79 BPM | WEIGHT: 108.25 LBS | TEMPERATURE: 97.9 F | HEIGHT: 57 IN

## 2021-06-23 DIAGNOSIS — R79.89 ELEVATED FERRITIN: Primary | ICD-10-CM

## 2021-06-23 DIAGNOSIS — D75.1 POLYCYTHEMIA: ICD-10-CM

## 2021-06-23 PROBLEM — E55.9 VITAMIN D DEFICIENCY: Status: ACTIVE | Noted: 2021-06-23

## 2021-06-23 PROBLEM — K21.9 GASTRO-ESOPHAGEAL REFLUX DISEASE WITHOUT ESOPHAGITIS: Status: ACTIVE | Noted: 2021-06-23

## 2021-06-23 PROBLEM — I10 ESSENTIAL HYPERTENSION: Status: ACTIVE | Noted: 2021-06-23

## 2021-06-23 PROBLEM — F33.0 MILD RECURRENT MAJOR DEPRESSION: Status: ACTIVE | Noted: 2021-06-23

## 2021-06-23 PROCEDURE — G0463 HOSPITAL OUTPT CLINIC VISIT: HCPCS

## 2021-06-23 PROCEDURE — 99203 OFFICE O/P NEW LOW 30 MIN: CPT | Performed by: INTERNAL MEDICINE

## 2021-06-23 NOTE — PROGRESS NOTES
Carmita Devi   : 1970     LOCATION: [facility]     Chief Complaint  ELEVATED FERRITIN (New patient evaluation for hyperferritinemia and history of erythrocytosis)    Referring Provider: Josue Huggins MD  PCP: Luciano Yepez MD    Oncology/Hematology History Overview Note   HEME/ONC DX/RX/INVESTIGATIONS  DX:   Erythrocytosis, apparently secondary to sleep apnea    Hyperferritinemia (minimal).     Chronic elevation of LFTs, managed by GI.    RX:   The patient was previously managed by outside hematologist.  The only treatment previously recommended was CPAP. Never had therapeutic phlebotomy or prescriptions.    INVESTIGATIONS:   2020, ferritin 104.  2020, iron 50 TIBC 408 saturation 37% ferritin 206.  WBC 6.08 hemoglobin 15.8 MCV 85 platelet 376.       Subjective        History of Present Illness   HPI  This is a very pleasant 51-year-old white female with history of erythrocytosis.  The patient is a fair historian.  She came accompanied by her father who is also a good historian.  The patient reports that was managed by another local hematologist and never required any specific treatment except for recommendations regarding sleep apnea and use of CPAP.  The patient cannot tolerate CPAP and normally does not use it. The patient decided to change heme-onc practices therefore she is here.  She denies any acute symptoms.  The patient and the father had heme-onc consultation with a friend of theirs over the Internet. They recommended that the patient be tested for myeloproliferative disorder and recommended JAK2 testing and EPO level.  I agreed with that.  The patient would like to have those tests done next month.  She was noted on 2020 to have minimally elevated ferritin level of 206.  Ferritin level in 2020 was 104.  The latest hemoglobin level in November of last year was 15.8.  The patient reports that in the past the hemoglobin was as  high as 17.  She denies any symptoms that may suggest hyperviscosity.  She is chronically debilitated secondary to muscle-skeletal disease.  She uses a cane. She denies dizziness, headaches.  We decided to obtain laboratory evaluation in 1 month and have heme-onc follow-up here 2 weeks after that.    Review of Systems   Constitutional: Positive for fatigue. Negative for appetite change, diaphoresis, fever, unexpected weight gain and unexpected weight loss.   HENT: Negative for hearing loss, sore throat and voice change.    Eyes: Negative for blurred vision, double vision, pain, redness and visual disturbance.   Respiratory: Negative for cough, shortness of breath and wheezing.    Cardiovascular: Negative for chest pain, palpitations and leg swelling.   Endocrine: Negative for cold intolerance, heat intolerance, polydipsia and polyuria.   Genitourinary: Negative for decreased urine volume, difficulty urinating, frequency and urinary incontinence.   Musculoskeletal: Negative for arthralgias, back pain, joint swelling and myalgias.   Skin: Negative for color change, rash, skin lesions and bruise.   Neurological: Negative for dizziness, seizures, numbness and headache.   Hematological: Negative for adenopathy. Does not bruise/bleed easily.   Psychiatric/Behavioral: Negative for depressed mood. The patient is not nervous/anxious.      Current Outpatient Medications on File Prior to Visit   Medication Sig Dispense Refill   • multivitamin with minerals (CENTRUM SILVER PO) Centrum Silver 0.4-300-250 mg-mcg-mcg oral tablet take 1 tablet by oral route daily   Suspended     • amLODIPine (NORVASC) 5 MG tablet amlodipine 5 mg oral tablet take 1 tablet (5 mg) by oral route once daily   Active     • aspirin (aspirin) 81 MG EC tablet Aspir-81 81 mg oral tablet,delayed release (DR/EC) take 1 tablet (81 mg) by oral route once daily   Suspended     • azelastine (ASTEPRO) 0.15 % solution nasal spray azelastine 0.15 % (205.5 mcg) nasal  spray,non-aerosol spray 1 spray (205.5 mcg) in each nostril by intranasal route 2 times per day   Suspended     • baclofen (LIORESAL) 10 MG tablet baclofen 10 mg oral tablet take 1 tablet (10 mg) by oral route 3 times per day   Active     • buPROPion (WELLBUTRIN) 100 MG tablet bupropion HCl 100 mg oral tablet take 1 tablet (100 mg) by oral route 2 times per day   Suspended     • calcium carbonate (OS-ASHANTI) 1250 (500 Ca) MG tablet Calcium 500 500 mg calcium (1,250 mg) oral tablet take 1 tablet by oral route daily   Suspended     • Cholecalciferol 50 MCG (2000 UT) capsule Vitamin D3 2,000 unit oral capsule take 1 capsule by oral route daily   Active     • conjugated estrogens (Premarin) 0.625 MG/GM vaginal cream 0.5 g.     • denosumab (Prolia) 60 MG/ML solution prefilled syringe syringe 1 mL.     • esomeprazole (nexIUM) 40 MG capsule      • estrogens, conjugated, (Premarin) 0.625 MG tablet Premarin 0.625 mg oral tablet take 1 tablet (0.625 mg) by oral route once daily   Suspended     • fluticasone (FLONASE) 50 MCG/ACT nasal spray fluticasone 50 mcg/actuation nasal spray,suspension spray 1 spray (50 mcg) in each nostril by intranasal route once daily   Active     • ibuprofen (ADVIL,MOTRIN) 600 MG tablet ibuprofen 600 mg oral tablet take 1 tablet (600 mg) by oral route 3 times per day with food   Suspended     • omeprazole (priLOSEC) 20 MG capsule omeprazole 20 mg oral capsule,delayed release(DR/EC) take 2 capsules (40 mg) by oral route once daily before a meal   Active     • Pyrithione Zinc 0.25 % liquid DermaZinc Spray 0.25 % topical spray,non-aerosol apply as directed by topical route once   Active     • XYLITOL MT xylitol sinus spray nasal spray as directed   Suspended       No current facility-administered medications on file prior to visit.       Allergies   Allergen Reactions   • Sulfa Antibiotics Unknown - Low Severity       Past Medical History:   Diagnosis Date   • Facial basal cell cancer    • Hypertension   "  • Osteoporosis      Past Surgical History:   Procedure Laterality Date   • SKIN CANCER EXCISION       Social History     Socioeconomic History   • Marital status: Single     Spouse name: Not on file   • Number of children: Not on file   • Years of education: Not on file   • Highest education level: Not on file   Tobacco Use   • Smoking status: Never Smoker   Substance and Sexual Activity   • Alcohol use: Never   • Drug use: Never     Family History   Problem Relation Age of Onset   • Skin cancer Father        There is no immunization history on file for this patient.    Objective     Vitals:    06/23/21 1429   BP: 138/80   Pulse: 79   Resp: 18   Temp: 97.9 °F (36.6 °C)   TempSrc: Temporal   SpO2: 100%   Weight: 49.1 kg (108 lb 3.9 oz)   Height: 144.8 cm (57\")   PainSc: 0-No pain     Body mass index is 23.42 kg/m².     Physical Exam    Alert, oriented x3, cooperative, not in distress, uses a cane for ambulation, short stature.  HEENT: Normocephalic, wearing a facemask.  Lungs: No tachypnea. Clear bilaterally.  Heart: Regular rate and rhythm.  Abdomen: Soft, nontender, no palpable organomegaly.  Extremities: No ankle edema.  Chronic deformity of both hands are noted.  Neurologic: Moves all extremities.      Iron   Date Value Ref Range Status   11/20/2020 150 60 - 170 ug/dL Final   08/21/2020 188 (H) 60 - 170 ug/dL Final     Iron Saturation   Date Value Ref Range Status   11/20/2020 37 20 - 55 % Final   08/21/2020 43 20 - 55 % Final     Transferrin   Date Value Ref Range Status   11/20/2020 285.00 250.00 - 380.00 mg/dL Final   08/21/2020 307.00 250.00 - 380.00 mg/dL Final     TIBC   Date Value Ref Range Status   11/20/2020 408 245 - 450 ug/dL Final     Comment:     As of 10/15/03, the chemistry department began utilizing a Transferrin  assay. Data suggests that Transferrin is a better estimate of Total Iron  binding capacity than the TIBC assay. As a result the TIBC will now be a  calculated estimate from the " measured Transferrin.     08/21/2020 439 245 - 450 ug/dL Final     Comment:     As of 10/15/03, the chemistry department began utilizing a Transferrin  assay. Data suggests that Transferrin is a better estimate of Total Iron  binding capacity than the TIBC assay. As a result the TIBC will now be a  calculated estimate from the measured Transferrin.       Ferritin   Date Value Ref Range Status   11/20/2020 206 (H) 10 - 200 ng/mL Final     Comment:     <10 ng/ml usually associated with Iron Deficiency Anemia.  Above normal range levels may be due to Hepatic and/or  Chronic Inflammatory Disease.     08/21/2020 104 10 - 200 ng/mL Final     Comment:     <10 ng/ml usually associated with Iron Deficiency Anemia.  Above normal range levels may be due to Hepatic and/or  Chronic Inflammatory Disease.       Vitamin B-12   Date Value Ref Range Status   11/20/2020 792 211 - 911 pg/mL Final     Folate   Date Value Ref Range Status   11/20/2020 >20.0 4.8 - 20.0 ng/mL Final     Comment:     Results may be falsely decreased due to light exposure if  testing added on after collection.       ECOG score: 1           Result Review :   The following data was reviewed by: Jordon Buckley MD on 06/23/2021:  Lab Results   Component Value Date    HGB 15.8 11/20/2020    HCT 44.6 11/20/2020    MCV 85.0 11/20/2020     11/20/2020    WBC 6.08 11/20/2020    NEUTROABS 3.82 11/20/2020    LYMPHSABS 1.60 11/20/2020    MONOSABS 0.48 11/20/2020    EOSABS 0.09 11/20/2020    BASOSABS 0.07 11/20/2020     Lab Results   Component Value Date    BUN 10 11/20/2020    CREATININE 0.76 11/20/2020     11/20/2020    K 3.7 11/20/2020     11/20/2020    CO2 26 11/20/2020    CALCIUM 10.9 (H) 11/20/2020    PROTEINTOT 7.3 11/20/2020    ALBUMIN 4.5 11/20/2020    BILITOT 0.67 11/20/2020    ALKPHOS 177 (H) 11/20/2020    AST 56 (H) 11/20/2020    ALT 49 (H) 11/20/2020        Assessment and Plan      ASSESSMENT  History of erythrocytosis, apparently secondary  to sleep apnea.  The patient was previously managed by a private local hematologist but those records are not available.  Differential diagnosis includes myeloproliferative disorder.    Minimal hyperferritinemia.  History of elevated LFTs.  The hyperferritinemia could be secondary to chronic liver disease and less likely secondary to hereditary hemochromatosis.    PLAN/RECOMMENDATIONS  Laboratory evaluation regarding erythrocytosis and hyperferritinemia in 1 month.    Heme-onc follow-up 2 weeks after labs are drawn.    Diagnoses and all orders for this visit:    1. Elevated ferritin (Primary)  -     Cancel: Ferritin  -     Cancel: Iron Profile  -     Ferritin; Future  -     Iron Profile; Future  -     Hemochromatosis Mutation; Future    2. Polycythemia  -     Cancel: CBC Auto Differential  -     Cancel: Comprehensive Metabolic Panel  -     Cancel: Vitamin B12 & Folate  -     Cancel: JAK2 V617F Mutational Analysis, Quant; Future  -     Cancel: Erythropoietin  -     CBC Auto Differential; Future  -     Comprehensive Metabolic Panel; Future  -     Erythropoietin; Future  -     JAK2 V617F Mutational Analysis, Quant; Future  -     Vitamin B12 & Folate; Future        Patient was given instructions and counseling regarding her condition or for health maintenance advice. Please see specific information pulled into the AVS if appropriate.     Jordon Buckley MD    6/23/2021

## 2021-07-15 VITALS
TEMPERATURE: 97.3 F | BODY MASS INDEX: 22.87 KG/M2 | WEIGHT: 106 LBS | OXYGEN SATURATION: 100 % | HEIGHT: 57 IN | DIASTOLIC BLOOD PRESSURE: 80 MMHG | HEART RATE: 87 BPM | SYSTOLIC BLOOD PRESSURE: 123 MMHG

## 2021-07-23 ENCOUNTER — APPOINTMENT (OUTPATIENT)
Dept: ONCOLOGY | Facility: HOSPITAL | Age: 51
End: 2021-07-23

## 2021-08-05 ENCOUNTER — LAB (OUTPATIENT)
Dept: ONCOLOGY | Facility: HOSPITAL | Age: 51
End: 2021-08-05

## 2021-08-05 DIAGNOSIS — D75.1 POLYCYTHEMIA: ICD-10-CM

## 2021-08-05 DIAGNOSIS — R79.89 ELEVATED FERRITIN: ICD-10-CM

## 2021-08-05 LAB
ALBUMIN SERPL-MCNC: 4.61 G/DL (ref 3.5–5.2)
ALBUMIN/GLOB SERPL: 2.1 G/DL
ALP SERPL-CCNC: 157 U/L (ref 39–117)
ALT SERPL W P-5'-P-CCNC: 55 U/L (ref 1–33)
ANION GAP SERPL CALCULATED.3IONS-SCNC: 9.3 MMOL/L (ref 5–15)
AST SERPL-CCNC: 65 U/L (ref 1–32)
BASOPHILS # BLD AUTO: 0.05 10*3/MM3 (ref 0–0.2)
BASOPHILS # BLD MANUAL: 0.08 10*3/MM3 (ref 0–0.2)
BASOPHILS NFR BLD AUTO: 0.6 % (ref 0–1.5)
BASOPHILS NFR BLD AUTO: 1 % (ref 0–1.5)
BILIRUB SERPL-MCNC: 0.4 MG/DL (ref 0–1.2)
BUN SERPL-MCNC: 11 MG/DL (ref 6–20)
BUN/CREAT SERPL: 16.7 (ref 7–25)
CALCIUM SPEC-SCNC: 10.2 MG/DL (ref 8.6–10.5)
CHLORIDE SERPL-SCNC: 102 MMOL/L (ref 98–107)
CO2 SERPL-SCNC: 27.7 MMOL/L (ref 22–29)
CREAT SERPL-MCNC: 0.66 MG/DL (ref 0.57–1)
DEPRECATED RDW RBC AUTO: 38.6 FL (ref 37–54)
EOSINOPHIL # BLD AUTO: 0.07 10*3/MM3 (ref 0–0.4)
EOSINOPHIL # BLD MANUAL: 0.16 10*3/MM3 (ref 0–0.4)
EOSINOPHIL NFR BLD AUTO: 0.9 % (ref 0.3–6.2)
EOSINOPHIL NFR BLD MANUAL: 2 % (ref 0.3–6.2)
ERYTHROCYTE [DISTWIDTH] IN BLOOD BY AUTOMATED COUNT: 12.8 % (ref 12.3–15.4)
FERRITIN SERPL-MCNC: 68.6 NG/ML (ref 13–150)
FOLATE SERPL-MCNC: 13.9 NG/ML (ref 4.78–24.2)
GFR SERPL CREATININE-BSD FRML MDRD: 94 ML/MIN/1.73
GLOBULIN UR ELPH-MCNC: 2.2 GM/DL
GLUCOSE SERPL-MCNC: 156 MG/DL (ref 65–99)
HCT VFR BLD AUTO: 42.3 % (ref 34–46.6)
HGB BLD-MCNC: 15.7 G/DL (ref 12–15.9)
IMM GRANULOCYTES # BLD AUTO: 0.02 10*3/MM3 (ref 0–0.05)
IMM GRANULOCYTES NFR BLD AUTO: 0.2 % (ref 0–0.5)
IRON 24H UR-MRATE: 87 MCG/DL (ref 37–145)
IRON SATN MFR SERPL: 21 % (ref 20–50)
LYMPHOCYTES # BLD AUTO: 1.74 10*3/MM3 (ref 0.7–3.1)
LYMPHOCYTES # BLD MANUAL: 1.13 10*3/MM3 (ref 0.7–3.1)
LYMPHOCYTES NFR BLD AUTO: 21.6 % (ref 19.6–45.3)
LYMPHOCYTES NFR BLD MANUAL: 3 % (ref 5–12)
LYMPHOCYTES NFR BLD MANUAL: 5 % (ref 19.6–45.3)
MCH RBC QN AUTO: 30.7 PG (ref 26.6–33)
MCHC RBC AUTO-ENTMCNC: 37.1 G/DL (ref 31.5–35.7)
MCV RBC AUTO: 82.6 FL (ref 79–97)
MONOCYTES # BLD AUTO: 0.24 10*3/MM3 (ref 0.1–0.9)
MONOCYTES # BLD AUTO: 0.5 10*3/MM3 (ref 0.1–0.9)
MONOCYTES NFR BLD AUTO: 6.2 % (ref 5–12)
NEUTROPHILS # BLD AUTO: 6.44 10*3/MM3 (ref 1.7–7)
NEUTROPHILS NFR BLD AUTO: 5.67 10*3/MM3 (ref 1.7–7)
NEUTROPHILS NFR BLD AUTO: 70.5 % (ref 42.7–76)
NEUTROPHILS NFR BLD MANUAL: 79 % (ref 42.7–76)
NEUTS BAND NFR BLD MANUAL: 1 % (ref 0–5)
PLATELET # BLD AUTO: 335 10*3/MM3 (ref 140–450)
PMV BLD AUTO: 9.5 FL (ref 6–12)
POTASSIUM SERPL-SCNC: 3.5 MMOL/L (ref 3.5–5.2)
PROT SERPL-MCNC: 6.8 G/DL (ref 6–8.5)
RBC # BLD AUTO: 5.12 10*6/MM3 (ref 3.77–5.28)
RBC MORPH BLD: NORMAL
SMALL PLATELETS BLD QL SMEAR: ADEQUATE
SODIUM SERPL-SCNC: 139 MMOL/L (ref 136–145)
TIBC SERPL-MCNC: 411 MCG/DL (ref 298–536)
TRANSFERRIN SERPL-MCNC: 276 MG/DL (ref 200–360)
VARIANT LYMPHS NFR BLD MANUAL: 9 % (ref 0–5)
VIT B12 BLD-MCNC: 739 PG/ML (ref 211–946)
WBC # BLD AUTO: 8.05 10*3/MM3 (ref 3.4–10.8)
WBC MORPH BLD: NORMAL

## 2021-08-05 PROCEDURE — 82607 VITAMIN B-12: CPT

## 2021-08-05 PROCEDURE — 36415 COLL VENOUS BLD VENIPUNCTURE: CPT

## 2021-08-05 PROCEDURE — 82728 ASSAY OF FERRITIN: CPT

## 2021-08-05 PROCEDURE — 85025 COMPLETE CBC W/AUTO DIFF WBC: CPT

## 2021-08-05 PROCEDURE — 80053 COMPREHEN METABOLIC PANEL: CPT

## 2021-08-05 PROCEDURE — 83540 ASSAY OF IRON: CPT

## 2021-08-05 PROCEDURE — 84466 ASSAY OF TRANSFERRIN: CPT

## 2021-08-05 PROCEDURE — 82746 ASSAY OF FOLIC ACID SERUM: CPT

## 2021-08-05 PROCEDURE — 82668 ASSAY OF ERYTHROPOIETIN: CPT

## 2021-08-06 LAB — EPO SERPL-ACNC: 7.5 MIU/ML (ref 2.6–18.5)

## 2021-08-09 ENCOUNTER — OFFICE VISIT (OUTPATIENT)
Dept: PODIATRY | Facility: CLINIC | Age: 51
End: 2021-08-09

## 2021-08-09 VITALS
SYSTOLIC BLOOD PRESSURE: 128 MMHG | HEIGHT: 57 IN | OXYGEN SATURATION: 99 % | WEIGHT: 102 LBS | DIASTOLIC BLOOD PRESSURE: 85 MMHG | BODY MASS INDEX: 22.01 KG/M2 | TEMPERATURE: 97.6 F | HEART RATE: 87 BPM

## 2021-08-09 DIAGNOSIS — M79.672 FOOT PAIN, BILATERAL: Primary | ICD-10-CM

## 2021-08-09 DIAGNOSIS — L60.0 ONYCHOCRYPTOSIS: ICD-10-CM

## 2021-08-09 DIAGNOSIS — R26.2 DIFFICULTY WALKING: ICD-10-CM

## 2021-08-09 DIAGNOSIS — M79.671 FOOT PAIN, BILATERAL: Primary | ICD-10-CM

## 2021-08-09 DIAGNOSIS — L84 CALLUS OF FOOT: ICD-10-CM

## 2021-08-09 DIAGNOSIS — B35.1 ONYCHOMYCOSIS: ICD-10-CM

## 2021-08-09 LAB
CITATION REF LAB TEST: NORMAL
JAK2 P.V617F BLD/T QL: NORMAL
LAB DIRECTOR NAME PROVIDER: NORMAL
LABORATORY COMMENT REPORT: NORMAL
REF LAB TEST METHOD: NORMAL

## 2021-08-09 PROCEDURE — 11721 DEBRIDE NAIL 6 OR MORE: CPT | Performed by: PODIATRIST

## 2021-08-09 PROCEDURE — 11057 PARNG/CUTG B9 HYPRKR LES >4: CPT | Performed by: PODIATRIST

## 2021-08-09 RX ORDER — PANTOPRAZOLE SODIUM 40 MG/1
40 TABLET, DELAYED RELEASE ORAL 2 TIMES DAILY
COMMUNITY
Start: 2021-08-05

## 2021-08-09 NOTE — PROGRESS NOTES
[facility] - PODIATRY    Today's Date: 08/09/21    Patient Name: Carmita Devi  MRN: [id number]  CSN: [id number]  PCP: Luciano Yepez MD  Referring Provider: No ref. provider found    SUBJECTIVE     Chief Complaint   Patient presents with   • Left Foot - Nail Problem   • Right Foot - Nail Problem     HPI: Carmita Devi, a 51 y.o.female, comes to clinic.    New, Established, New Problem:  Established    Location:  Toenails    Duration:   Greater than five years    Onset:  Gradual    Nature:  sore with palpation.    Stable, worsening, improving:   Stable    Aggravating factors:  Pain with shoe gear and ambulation.    Previous Treatment:  debridement  _______________________________________________________________________    New, Established, New Problem: Established    Location:  hyperkeratotic lesion(s) on right forefoot    Duration: Greater than 1 month    Onset:  gradual    Nature:  sore    Stable, worsening, improving: Recurring    Aggravating factors:  Patient reports lesion(s) is painful with shoegear and ambulation.      Previous Treatment:   Treatment    Last PCP Visit:  Luciaon Yepez MD, April 2021  _______________________________________________________________________    Patient reports the following medical changes since their last visit: None.    No other pedal complaints at this time.    Patient denies any fevers, chills, nausea, vomiting, shortness of breathe, nor any other constitutional signs nor symptoms.       Past Medical History:   Diagnosis Date   • Allergic rhinitis    • Bilateral foot pain    • Chronic frontal sinusitis    • Chronic maxillary sinusitis    • Corns and callus    • Deviated septum    • Difficulty walking    • Facial basal cell cancer    • GERD (gastroesophageal reflux disease)    • HBP (high blood pressure)    • Headache    • Hoarseness    • Hypertension    • Ingrowing toenail    • Mentally challenged    • Myofascial muscle pain    • Osteoporosis    •  Otalgia    • Tinea unguium      Past Surgical History:   Procedure Laterality Date   • SINUS SURGERY     • SKIN CANCER EXCISION       Family History   Problem Relation Age of Onset   • Skin cancer Father      Social History     Socioeconomic History   • Marital status: Single     Spouse name: Not on file   • Number of children: Not on file   • Years of education: Not on file   • Highest education level: Not on file   Tobacco Use   • Smoking status: Never Smoker   • Smokeless tobacco: Never Used   Vaping Use   • Vaping Use: Never used   Substance and Sexual Activity   • Alcohol use: Never   • Drug use: Never   • Sexual activity: Defer     Allergies   Allergen Reactions   • Sulfa Antibiotics Unknown - Low Severity     Current Outpatient Medications   Medication Sig Dispense Refill   • amLODIPine (NORVASC) 5 MG tablet amlodipine 5 mg oral tablet take 1 tablet (5 mg) by oral route once daily   Active     • aspirin (aspirin) 81 MG EC tablet Aspir-81 81 mg oral tablet,delayed release (DR/EC) take 1 tablet (81 mg) by oral route once daily   Suspended     • azelastine (ASTEPRO) 0.15 % solution nasal spray azelastine 0.15 % (205.5 mcg) nasal spray,non-aerosol spray 1 spray (205.5 mcg) in each nostril by intranasal route 2 times per day   Suspended     • baclofen (LIORESAL) 10 MG tablet baclofen 10 mg oral tablet take 1 tablet (10 mg) by oral route 3 times per day   Active     • buPROPion (WELLBUTRIN) 100 MG tablet bupropion HCl 100 mg oral tablet take 1 tablet (100 mg) by oral route 2 times per day   Suspended     • calcium carbonate (OS-ASHANTI) 1250 (500 Ca) MG tablet Calcium 500 500 mg calcium (1,250 mg) oral tablet take 1 tablet by oral route daily   Suspended     • Cholecalciferol 50 MCG (2000 UT) capsule Vitamin D3 2,000 unit oral capsule take 1 capsule by oral route daily   Active     • conjugated estrogens (Premarin) 0.625 MG/GM vaginal cream 0.5 g.     • denosumab (Prolia) 60 MG/ML solution prefilled syringe syringe 1  mL.     • estrogens, conjugated, (Premarin) 0.625 MG tablet Premarin 0.625 mg oral tablet take 1 tablet (0.625 mg) by oral route once daily   Suspended     • fluticasone (FLONASE) 50 MCG/ACT nasal spray fluticasone 50 mcg/actuation nasal spray,suspension spray 1 spray (50 mcg) in each nostril by intranasal route once daily   Active     • ibuprofen (ADVIL,MOTRIN) 600 MG tablet ibuprofen 600 mg oral tablet take 1 tablet (600 mg) by oral route 3 times per day with food   Suspended     • multivitamin with minerals (CENTRUM SILVER PO) Centrum Silver 0.4-300-250 mg-mcg-mcg oral tablet take 1 tablet by oral route daily   Suspended     • pantoprazole (PROTONIX) 40 MG EC tablet Take 40 mg by mouth 2 (two) times a day.     • Pyrithione Zinc 0.25 % liquid DermaZinc Spray 0.25 % topical spray,non-aerosol apply as directed by topical route once   Active     • XYLITOL MT xylitol sinus spray nasal spray as directed   Suspended       No current facility-administered medications for this visit.     Review of Systems   Constitutional: Negative.    Skin:        Painful toenails and hyperkeratotic lesions.   All other systems reviewed and are negative.      OBJECTIVE     Vitals:    08/09/21 1038   BP: 128/85   Pulse: 87   Temp: 97.6 °F (36.4 °C)   SpO2: 99%       Patient seen in no apparent distress.      PHYSICAL EXAM:     Foot/Ankle Exam:       General:   Appearance: appears stated age and healthy    Orientation: AAOx3    Affect: appropriate    Gait: antalgic    Assistance: cane    Shoe Gear:  Sandals    VASCULAR      Right Foot Vascularity   Normal vascular exam    Dorsalis pedis:  2+  Posterior tibial:  2+  Skin Temperature: warm    Edema Grading:  None  CFT:  < 3 seconds  Pedal Hair Growth:  Present  Varicosities: none       Left Foot Vascularity   Normal vascular exam    Dorsalis pedis:  2+  Posterior tibial:  2+  Skin Temperature: warm    Edema Grading:  None  CFT:  < 3 seconds  Pedal Hair Growth:  Present  Varicosities: none         NEUROLOGIC     Right Foot Neurologic   Normal sensation    Light touch sensation:  Normal  Vibratory sensation:  Normal  Hot/Cold sensation: normal    Protective Sensation using [place] Monofilament:  10     Left Foot Neurologic   Normal sensation    Light touch sensation:  Normal  Vibratory sensation:  Normal  Hot/cold sensation: normal    Protective Sensation using [place] Monofilament:  10     MUSCLE STRENGTH     Right Foot Muscle Strength   Foot dorsiflexion:  4  Foot plantar flexion:  4  Foot inversion:  4  Foot eversion:  4     Left Foot Muscle Strength   Foot dorsiflexion:  4  Foot plantar flexion:  4  Foot inversion:  4  Foot eversion:  4     RANGE OF MOTION      Right Foot Range of Motion   Foot and ankle ROM within normal limits       Left Foot Range of Motion   Foot and ankle ROM within normal limits       DERMATOLOGIC     Right Foot Dermatologic   Skin: skin intact    Nails: onychomycosis, abnormally thick, subungual debris, dystrophic nails and ingrown toenail    Nails comment:  Toenails 1, 2, 3, 4, and 5     Left Foot Dermatologic   Skin: skin intact    Nails: onychomycosis, abnormally thick, subungual debris, dystrophic nails and ingrown toenail    Nails comment:  Toenails 1, 2, 3, 4, and 5      Right Foot Additional Comments 5 hyperkeratotic lesions on plantar forefoot.  Partial thickness debridement with #10 scalpel blade down to health tissue.  No signs of edema, erythema, lymphangitis, nor signs of infection.            ASSESSMENT/PLAN     Diagnoses and all orders for this visit:    1. Foot pain, bilateral (Primary)    2. Onychocryptosis    3. Onychomycosis    4. Callus of foot    5. Difficulty walking        Comprehensive lower extremity examination and evaluation was performed.    Discussed findings and treatment plan including risks, benefits, and treatment options with patient in detail. Patient agreed with treatment plan.    Toenails 1 through 5 bilaterally were  debrided in thickness and length and then smoothed with a Dremel Tool.  Tolerated the procedure well without complications.    An After Visit Summary was printed and given to the patient at discharge, including (if requested) any available informative/educational handouts regarding diagnosis, treatment, or medications. All questions were answered to patient/family satisfaction. Should symptoms fail to improve or worsen they agree to call or return to clinic or to go to the Emergency Department. Discussed the importance of following up with any needed screening tests/labs/specialist appointments and any requested follow-up recommended by me today. Importance of maintaining follow-up discussed and patient accepts that missed appointments can delay diagnosis and potentially lead to worsening of conditions.    Return in about 9 weeks (around 10/11/2021) for Toenail Care., or sooner if acute issues arise.    This document has been electronically signed by Bertin Fountain DPM on August 9, 2021 11:08 EDT

## 2021-08-10 LAB — HFE GENE MUT ANL BLD/T: NORMAL

## 2021-08-25 ENCOUNTER — OFFICE VISIT (OUTPATIENT)
Dept: ONCOLOGY | Facility: HOSPITAL | Age: 51
End: 2021-08-25

## 2021-08-25 VITALS
RESPIRATION RATE: 16 BRPM | OXYGEN SATURATION: 100 % | DIASTOLIC BLOOD PRESSURE: 74 MMHG | HEART RATE: 75 BPM | WEIGHT: 107.36 LBS | BODY MASS INDEX: 23.23 KG/M2 | TEMPERATURE: 98.4 F | SYSTOLIC BLOOD PRESSURE: 127 MMHG

## 2021-08-25 DIAGNOSIS — R79.89 ELEVATED FERRITIN: ICD-10-CM

## 2021-08-25 DIAGNOSIS — D75.1 SECONDARY ERYTHROCYTOSIS: Primary | ICD-10-CM

## 2021-08-25 PROCEDURE — 99213 OFFICE O/P EST LOW 20 MIN: CPT | Performed by: INTERNAL MEDICINE

## 2021-08-25 PROCEDURE — G0463 HOSPITAL OUTPT CLINIC VISIT: HCPCS

## 2021-08-25 NOTE — PROGRESS NOTES
Carmita Devi   : 1970     LOCATION: [facility] HEMATOLOGY & ONCOLOGY     Chief Complaint  elevated ferritin (-fu)    Referring Provider: Luciano Yepez MD  PCP: Luciano Yepez MD    Oncology/Hematology History Overview Note   HEME/ONC DX/RX/INVESTIGATIONS  DX:   Erythrocytosis, apparently secondary to sleep apnea    Hyperferritinemia (minimal).     Chronic elevation of LFTs, managed by GI.    RX:   The patient was previously managed by outside hematologist.  The only treatment previously recommended was CPAP. Never had therapeutic phlebotomy or prescriptions.    INVESTIGATIONS:   2020, ferritin 104.  2020, iron 50 TIBC 408 saturation 37% ferritin 206.  WBC 6.08 hemoglobin 15.8 MCV 85 platelet 376.     Valerio 2 mutation is negative  Hemachromatosis gene mutation not found  Erythropoietin is normal  Per pt she has been diagnosed with mild sleep apnea CPAP prescribed but she could not tolerate it      Subjective        History of Present Illness   Pt is here for f/u tests results   Pt is present with her father  for review of the results    Review of Systems   Constitutional: Positive for fatigue (5/10). Negative for appetite change, diaphoresis, fever, unexpected weight gain and unexpected weight loss.   HENT: Negative for hearing loss, mouth sores, sore throat, swollen glands, trouble swallowing and voice change.    Eyes: Negative for blurred vision.   Respiratory: Negative for cough, shortness of breath and wheezing.    Cardiovascular: Negative for chest pain and palpitations.   Gastrointestinal: Negative for abdominal pain, blood in stool, constipation, diarrhea, nausea and vomiting.   Endocrine: Negative for cold intolerance and heat intolerance.   Genitourinary: Negative for difficulty urinating, dysuria, frequency, hematuria and urinary incontinence.   Musculoskeletal: Negative for arthralgias, back pain and myalgias.   Skin: Negative for rash, skin lesions and bruise.    Neurological: Negative for dizziness, seizures, weakness, numbness and headache.   Hematological: Does not bruise/bleed easily.   Psychiatric/Behavioral: Negative for depressed mood. The patient is not nervous/anxious.    All other systems reviewed and are negative.    Current Outpatient Medications on File Prior to Visit   Medication Sig Dispense Refill   • amLODIPine (NORVASC) 5 MG tablet amlodipine 5 mg oral tablet take 1 tablet (5 mg) by oral route once daily   Active     • aspirin (aspirin) 81 MG EC tablet Aspir-81 81 mg oral tablet,delayed release (DR/EC) take 1 tablet (81 mg) by oral route once daily   Suspended     • azelastine (ASTEPRO) 0.15 % solution nasal spray azelastine 0.15 % (205.5 mcg) nasal spray,non-aerosol spray 1 spray (205.5 mcg) in each nostril by intranasal route 2 times per day   Suspended     • baclofen (LIORESAL) 10 MG tablet baclofen 10 mg oral tablet take 1 tablet (10 mg) by oral route 3 times per day   Active     • buPROPion (WELLBUTRIN) 100 MG tablet bupropion HCl 100 mg oral tablet take 1 tablet (100 mg) by oral route 2 times per day   Suspended     • calcium carbonate (OS-ASHANTI) 1250 (500 Ca) MG tablet Calcium 500 500 mg calcium (1,250 mg) oral tablet take 1 tablet by oral route daily   Suspended     • Cholecalciferol 50 MCG (2000 UT) capsule Vitamin D3 2,000 unit oral capsule take 1 capsule by oral route daily   Active     • conjugated estrogens (Premarin) 0.625 MG/GM vaginal cream 0.5 g.     • denosumab (Prolia) 60 MG/ML solution prefilled syringe syringe 1 mL.     • estrogens, conjugated, (Premarin) 0.625 MG tablet Premarin 0.625 mg oral tablet take 1 tablet (0.625 mg) by oral route once daily   Suspended     • fluticasone (FLONASE) 50 MCG/ACT nasal spray fluticasone 50 mcg/actuation nasal spray,suspension spray 1 spray (50 mcg) in each nostril by intranasal route once daily   Active     • ibuprofen (ADVIL,MOTRIN) 600 MG tablet ibuprofen 600 mg oral tablet take 1 tablet (600 mg)  by oral route 3 times per day with food   Suspended     • multivitamin with minerals (CENTRUM SILVER PO) Centrum Silver 0.4-300-250 mg-mcg-mcg oral tablet take 1 tablet by oral route daily   Suspended     • pantoprazole (PROTONIX) 40 MG EC tablet Take 40 mg by mouth 2 (two) times a day.     • Pyrithione Zinc 0.25 % liquid DermaZinc Spray 0.25 % topical spray,non-aerosol apply as directed by topical route once   Active     • XYLITOL MT xylitol sinus spray nasal spray as directed   Suspended       No current facility-administered medications on file prior to visit.       Allergies   Allergen Reactions   • Sulfa Antibiotics Unknown - Low Severity       Past Medical History:   Diagnosis Date   • Allergic rhinitis    • Bilateral foot pain    • Chronic frontal sinusitis    • Chronic maxillary sinusitis    • Corns and callus    • Deviated septum    • Difficulty walking    • Facial basal cell cancer    • GERD (gastroesophageal reflux disease)    • HBP (high blood pressure)    • Headache    • Hoarseness    • Hypertension    • Ingrowing toenail    • Mentally challenged    • Myofascial muscle pain    • Osteoporosis    • Otalgia    • Tinea unguium      Past Surgical History:   Procedure Laterality Date   • SINUS SURGERY     • SKIN CANCER EXCISION       Social History     Socioeconomic History   • Marital status: Single     Spouse name: Not on file   • Number of children: Not on file   • Years of education: Not on file   • Highest education level: Not on file   Tobacco Use   • Smoking status: Never Smoker   • Smokeless tobacco: Never Used   Vaping Use   • Vaping Use: Never used   Substance and Sexual Activity   • Alcohol use: Never   • Drug use: Never   • Sexual activity: Defer     Family History   Problem Relation Age of Onset   • Skin cancer Father      Immunization History   Administered Date(s) Administered   • COVID-19 (MODERNA) 05/07/2021, 06/04/2021       Objective     Vitals:    08/25/21 1402   BP: 127/74   Pulse: 75    Resp: 16   Temp: 98.4 °F (36.9 °C)   SpO2: 100%   Weight: 48.7 kg (107 lb 5.8 oz)   PainSc: 0-No pain     Body mass index is 23.23 kg/m².     Physical Exam    deferred      Iron   Date Value Ref Range Status   08/05/2021 87 37 - 145 mcg/dL Final   11/20/2020 150 60 - 170 ug/dL Final   08/21/2020 188 (H) 60 - 170 ug/dL Final     Iron Saturation   Date Value Ref Range Status   08/05/2021 21 20 - 50 % Final   11/20/2020 37 20 - 55 % Final   08/21/2020 43 20 - 55 % Final     Transferrin   Date Value Ref Range Status   08/05/2021 276 200 - 360 mg/dL Final   11/20/2020 285.00 250.00 - 380.00 mg/dL Final   08/21/2020 307.00 250.00 - 380.00 mg/dL Final     TIBC   Date Value Ref Range Status   08/05/2021 411 298 - 536 mcg/dL Final   11/20/2020 408 245 - 450 ug/dL Final     Comment:     As of 10/15/03, the chemistry department began utilizing a Transferrin  assay. Data suggests that Transferrin is a better estimate of Total Iron  binding capacity than the TIBC assay. As a result the TIBC will now be a  calculated estimate from the measured Transferrin.     08/21/2020 439 245 - 450 ug/dL Final     Comment:     As of 10/15/03, the chemistry department began utilizing a Transferrin  assay. Data suggests that Transferrin is a better estimate of Total Iron  binding capacity than the TIBC assay. As a result the TIBC will now be a  calculated estimate from the measured Transferrin.       Ferritin   Date Value Ref Range Status   08/05/2021 68.60 13.00 - 150.00 ng/mL Final   11/20/2020 206 (H) 10 - 200 ng/mL Final     Comment:     <10 ng/ml usually associated with Iron Deficiency Anemia.  Above normal range levels may be due to Hepatic and/or  Chronic Inflammatory Disease.     08/21/2020 104 10 - 200 ng/mL Final     Comment:     <10 ng/ml usually associated with Iron Deficiency Anemia.  Above normal range levels may be due to Hepatic and/or  Chronic Inflammatory Disease.       Vitamin B-12   Date Value Ref Range Status   08/05/2021  739 211 - 946 pg/mL Final   11/20/2020 792 211 - 911 pg/mL Final     Folate   Date Value Ref Range Status   08/05/2021 13.90 4.78 - 24.20 ng/mL Final   11/20/2020 >20.0 4.8 - 20.0 ng/mL Final     Comment:     Results may be falsely decreased due to light exposure if  testing added on after collection.       ECOG score: 2           PHQ-9 Total Score:           Result Review :   The following data was reviewed by: Janett Hernandez MD on 08/25/2021:  Lab Results   Component Value Date    HGB 15.7 08/05/2021    HCT 42.3 08/05/2021    MCV 82.6 08/05/2021     08/05/2021    WBC 8.05 08/05/2021    NEUTROABS 5.67 08/05/2021    NEUTROABS 6.44 08/05/2021    LYMPHSABS 1.74 08/05/2021    MONOSABS 0.50 08/05/2021    EOSABS 0.07 08/05/2021    EOSABS 0.16 08/05/2021    BASOSABS 0.05 08/05/2021    BASOSABS 0.08 08/05/2021     Lab Results   Component Value Date    GLUCOSE 156 (H) 08/05/2021    BUN 11 08/05/2021    CREATININE 0.66 08/05/2021     08/05/2021    K 3.5 08/05/2021     08/05/2021    CO2 27.7 08/05/2021    CALCIUM 10.2 08/05/2021    PROTEINTOT 6.8 08/05/2021    ALBUMIN 4.61 08/05/2021    BILITOT 0.4 08/05/2021    ALKPHOS 157 (H) 08/05/2021    AST 65 (H) 08/05/2021    ALT 55 (H) 08/05/2021         Data reviewed: labs          Assessment and Plan      ASSESSMENT   erythrocytosis  PLAN/RECOMMENDATIONS  There is no evidence for primary bone marrow process such as a myeloproliferative disorder given the lack of a JAK2 inhibitor mutation  The hemochromatosis gene also shows no mutation and there is no evidence for a low elevated erythropoietin as an etiology for erythrocytosis  The mild elevation in her red blood cell count is likely secondary to her sleep apnea  At present the patient reports that she is not using a CPAP machine as she cannot tolerate it  Phlebotomy is not indicated for secondary erythrocytosis unless the patient is symptomatic with headaches dizziness and have a extremely elevated hemoglobin  I  would continue to monitor the patient CBC every 6 months to monitor to see if phlebotomy might be indicated  No routine heme follow-up is indicated at this point    Diagnoses and all orders for this visit:    1. Secondary erythrocytosis (Primary)    2. Elevated ferritin    I spent 20 minutes caring for Carmita on this date of service. This time includes time spent by me in the following activities: reviewing tests, counseling and educating the patient/family/caregiver, documenting information in the medical record and independently interpreting results and communicating that information with the patient/family/caregiver      Patient was given instructions and counseling regarding her condition or for health maintenance advice. Please see specific information pulled into the AVS if appropriate.     Janett Hernandez MD    8/25/2021

## 2021-10-11 ENCOUNTER — TRANSCRIBE ORDERS (OUTPATIENT)
Dept: ADMINISTRATIVE | Facility: HOSPITAL | Age: 51
End: 2021-10-11

## 2021-10-11 DIAGNOSIS — Z12.31 SCREENING MAMMOGRAM FOR BREAST CANCER: Primary | ICD-10-CM

## 2021-10-12 ENCOUNTER — OFFICE VISIT (OUTPATIENT)
Dept: PODIATRY | Facility: CLINIC | Age: 51
End: 2021-10-12

## 2021-10-12 VITALS
HEART RATE: 84 BPM | TEMPERATURE: 97.5 F | BODY MASS INDEX: 23.3 KG/M2 | DIASTOLIC BLOOD PRESSURE: 80 MMHG | SYSTOLIC BLOOD PRESSURE: 130 MMHG | HEIGHT: 57 IN | OXYGEN SATURATION: 100 % | WEIGHT: 108 LBS

## 2021-10-12 DIAGNOSIS — L60.0 ONYCHOCRYPTOSIS: ICD-10-CM

## 2021-10-12 DIAGNOSIS — M79.672 FOOT PAIN, BILATERAL: Primary | ICD-10-CM

## 2021-10-12 DIAGNOSIS — B35.1 ONYCHOMYCOSIS: ICD-10-CM

## 2021-10-12 DIAGNOSIS — M79.671 FOOT PAIN, BILATERAL: Primary | ICD-10-CM

## 2021-10-12 DIAGNOSIS — R26.2 DIFFICULTY WALKING: ICD-10-CM

## 2021-10-12 DIAGNOSIS — L84 CALLUS OF FOOT: ICD-10-CM

## 2021-10-12 PROCEDURE — 11721 DEBRIDE NAIL 6 OR MORE: CPT | Performed by: PODIATRIST

## 2021-10-12 PROCEDURE — 11057 PARNG/CUTG B9 HYPRKR LES >4: CPT | Performed by: PODIATRIST

## 2021-10-12 NOTE — PROGRESS NOTES
[facility]    Today's Date: 10/12/21    Patient Name: Carmita Devi  MRN: [id number]  CSN: [id number]  PCP: Joseluis, last PCP visit: May 2021  Referring Provider: No ref. provider found    SUBJECTIVE     Chief Complaint   Patient presents with   • Left Foot - Nail Problem   • Right Foot - Nail Problem     HPI: Carmita Devi, a 51 y.o.female, comes to clinic.    New, Established, New Problem:  Established    Location:  Toenails    Duration:   Greater than five years    Onset:  Gradual    Nature:  sore with palpation.    Stable, worsening, improving:   Stable    Aggravating factors:  Pain with shoe gear and ambulation.    Previous Treatment:  debridement  _______________________________________________________________________    New, Established, New Problem: Established    Location:  hyperkeratotic lesion(s) on right forefoot    Duration: Greater than 1 month    Onset:  gradual    Nature:  sore    Stable, worsening, improving: Recurring    Aggravating factors:  Patient reports lesion(s) is painful with shoegear and ambulation.      Previous Treatment:   Treatment    ______________________________________________________________________    Patient relates no medical changes since their last visit.    No other pedal complaints at this time.    Patient denies any fevers, chills, nausea, vomiting, shortness of breathe, nor any other constitutional signs nor symptoms.       Past Medical History:   Diagnosis Date   • Allergic rhinitis    • Bilateral foot pain    • Chronic frontal sinusitis    • Chronic maxillary sinusitis    • Corns and callus    • Deviated septum    • Difficulty walking    • Facial basal cell cancer    • GERD (gastroesophageal reflux disease)    • HBP (high blood pressure)    • Headache    • Hoarseness    • Hypertension    • Ingrowing toenail    • Mentally challenged    • Myofascial muscle pain    • Osteoporosis    • Otalgia    • Tinea unguium      Past Surgical History:    Procedure Laterality Date   • SINUS SURGERY     • SKIN CANCER EXCISION       Family History   Problem Relation Age of Onset   • Skin cancer Father      Social History     Socioeconomic History   • Marital status: Single   Tobacco Use   • Smoking status: Never Smoker   • Smokeless tobacco: Never Used   Vaping Use   • Vaping Use: Never used   Substance and Sexual Activity   • Alcohol use: Never   • Drug use: Never   • Sexual activity: Defer     Allergies   Allergen Reactions   • Alendronate Sodium Other (See Comments)     Severe heartburn     • Sulfa Antibiotics Unknown - Low Severity     Current Outpatient Medications   Medication Sig Dispense Refill   • amLODIPine (NORVASC) 5 MG tablet amlodipine 5 mg oral tablet take 1 tablet (5 mg) by oral route once daily   Active     • aspirin (aspirin) 81 MG EC tablet Aspir-81 81 mg oral tablet,delayed release (DR/EC) take 1 tablet (81 mg) by oral route once daily   Suspended     • azelastine (ASTEPRO) 0.15 % solution nasal spray azelastine 0.15 % (205.5 mcg) nasal spray,non-aerosol spray 1 spray (205.5 mcg) in each nostril by intranasal route 2 times per day   Suspended     • baclofen (LIORESAL) 10 MG tablet baclofen 10 mg oral tablet take 1 tablet (10 mg) by oral route 3 times per day   Active     • buPROPion (WELLBUTRIN) 100 MG tablet bupropion HCl 100 mg oral tablet take 1 tablet (100 mg) by oral route 2 times per day   Suspended     • calcium carbonate (OS-ASHANTI) 1250 (500 Ca) MG tablet Calcium 500 500 mg calcium (1,250 mg) oral tablet take 1 tablet by oral route daily   Suspended     • Cholecalciferol 50 MCG (2000 UT) capsule Vitamin D3 2,000 unit oral capsule take 1 capsule by oral route daily   Active     • conjugated estrogens (Premarin) 0.625 MG/GM vaginal cream 0.5 g.     • denosumab (Prolia) 60 MG/ML solution prefilled syringe syringe 1 mL.     • estrogens, conjugated, (Premarin) 0.625 MG tablet Premarin 0.625 mg oral tablet take 1 tablet (0.625 mg) by oral route  once daily   Suspended     • fluticasone (FLONASE) 50 MCG/ACT nasal spray fluticasone 50 mcg/actuation nasal spray,suspension spray 1 spray (50 mcg) in each nostril by intranasal route once daily   Active     • ibuprofen (ADVIL,MOTRIN) 600 MG tablet ibuprofen 600 mg oral tablet take 1 tablet (600 mg) by oral route 3 times per day with food   Suspended     • multivitamin with minerals (CENTRUM SILVER PO) Centrum Silver 0.4-300-250 mg-mcg-mcg oral tablet take 1 tablet by oral route daily   Suspended     • pantoprazole (PROTONIX) 40 MG EC tablet Take 40 mg by mouth 2 (two) times a day.     • Pyrithione Zinc 0.25 % liquid DermaZinc Spray 0.25 % topical spray,non-aerosol apply as directed by topical route once   Active     • XYLITOL MT xylitol sinus spray nasal spray as directed   Suspended       No current facility-administered medications for this visit.     Review of Systems   Constitutional: Negative.    Skin:        Painful toenails and hyperkeratotic lesions.   All other systems reviewed and are negative.      OBJECTIVE     Vitals:    10/12/21 1350   BP: 130/80   Pulse: 84   Temp: 97.5 °F (36.4 °C)   SpO2: 100%       Patient seen in no apparent distress.      PHYSICAL EXAM:     Foot/Ankle Exam:       General:   Appearance: appears stated age and healthy    Orientation: AAOx3    Affect: appropriate    Gait: antalgic    Assistance: cane    Shoe Gear:  Sandals    VASCULAR      Right Foot Vascularity   Normal vascular exam    Dorsalis pedis:  2+  Posterior tibial:  2+  Skin Temperature: warm    Edema Grading:  None  CFT:  < 3 seconds  Pedal Hair Growth:  Present  Varicosities: none       Left Foot Vascularity   Normal vascular exam    Dorsalis pedis:  2+  Posterior tibial:  2+  Skin Temperature: warm    Edema Grading:  None  CFT:  < 3 seconds  Pedal Hair Growth:  Present  Varicosities: none        NEUROLOGIC     Right Foot Neurologic   Normal sensation    Light touch sensation:  Normal  Vibratory sensation:   Normal  Hot/Cold sensation: normal    Protective Sensation using [place]-Vanessa Monofilament:  10     Left Foot Neurologic   Normal sensation    Light touch sensation:  Normal  Vibratory sensation:  Normal  Hot/cold sensation: normal    Protective Sensation using [place]-Vanessa Monofilament:  10     MUSCLE STRENGTH     Right Foot Muscle Strength   Foot dorsiflexion:  4  Foot plantar flexion:  4  Foot inversion:  4  Foot eversion:  4     Left Foot Muscle Strength   Foot dorsiflexion:  4  Foot plantar flexion:  4  Foot inversion:  4  Foot eversion:  4     RANGE OF MOTION      Right Foot Range of Motion   Foot and ankle ROM within normal limits       Left Foot Range of Motion   Foot and ankle ROM within normal limits       DERMATOLOGIC     Right Foot Dermatologic   Skin: skin intact    Nails: onychomycosis, abnormally thick, subungual debris, dystrophic nails and ingrown toenail    Nails comment:  Toenails 1, 2, 3, 4, and 5     Left Foot Dermatologic   Skin: skin intact    Nails: onychomycosis, abnormally thick, subungual debris, dystrophic nails and ingrown toenail    Nails comment:  Toenails 1, 2, 3, 4, and 5      Right Foot Additional Comments 5 hyperkeratotic lesions on plantar forefoot.  Partial thickness debridement with #10 scalpel blade down to health tissue.  No signs of edema, erythema, lymphangitis, nor signs of infection.            ASSESSMENT/PLAN     Diagnoses and all orders for this visit:    1. Foot pain, bilateral (Primary)    2. Onychocryptosis    3. Onychomycosis    4. Callus of foot    5. Difficulty walking        Comprehensive lower extremity examination and evaluation was performed.    Discussed findings and treatment plan including risks, benefits, and treatment options with patient in detail. Patient agreed with treatment plan.    Toenails 1 through 5 bilaterally were debrided in thickness and length and then smoothed with a Dremel Tool.  Tolerated the procedure well without  complications.    An After Visit Summary was printed and given to the patient at discharge, including (if requested) any available informative/educational handouts regarding diagnosis, treatment, or medications. All questions were answered to patient/family satisfaction. Should symptoms fail to improve or worsen they agree to call or return to clinic or to go to the Emergency Department. Discussed the importance of following up with any needed screening tests/labs/specialist appointments and any requested follow-up recommended by me today. Importance of maintaining follow-up discussed and patient accepts that missed appointments can delay diagnosis and potentially lead to worsening of conditions.    Return in about 9 weeks (around 12/14/2021) for Toenail Care., or sooner if acute issues arise.    This document has been electronically signed by Bertin Fountain DPM on October 12, 2021 14:40 EDT

## 2021-12-16 ENCOUNTER — HOSPITAL ENCOUNTER (OUTPATIENT)
Dept: MAMMOGRAPHY | Facility: HOSPITAL | Age: 51
Discharge: HOME | End: 2021-12-16
Admitting: INTERNAL MEDICINE

## 2021-12-16 DIAGNOSIS — Z12.31 SCREENING MAMMOGRAM FOR BREAST CANCER: ICD-10-CM

## 2021-12-16 PROCEDURE — 77067 SCR MAMMO BI INCL CAD: CPT

## 2021-12-16 PROCEDURE — 77063 BREAST TOMOSYNTHESIS BI: CPT

## 2021-12-20 ENCOUNTER — TRANSCRIBE ORDERS (OUTPATIENT)
Dept: ADMINISTRATIVE | Facility: HOSPITAL | Age: 51
End: 2021-12-20

## 2021-12-20 DIAGNOSIS — N63.0 BREAST MASS: Primary | ICD-10-CM

## 2021-12-27 ENCOUNTER — APPOINTMENT (OUTPATIENT)
Dept: MAMMOGRAPHY | Facility: HOSPITAL | Age: 51
End: 2021-12-27

## 2021-12-27 ENCOUNTER — OFFICE VISIT (OUTPATIENT)
Dept: PODIATRY | Facility: CLINIC | Age: 51
End: 2021-12-27

## 2021-12-27 VITALS
WEIGHT: 106.6 LBS | SYSTOLIC BLOOD PRESSURE: 129 MMHG | BODY MASS INDEX: 23 KG/M2 | HEART RATE: 95 BPM | DIASTOLIC BLOOD PRESSURE: 85 MMHG | OXYGEN SATURATION: 100 % | TEMPERATURE: 95.9 F | HEIGHT: 57 IN

## 2021-12-27 DIAGNOSIS — L60.0 ONYCHOCRYPTOSIS: ICD-10-CM

## 2021-12-27 DIAGNOSIS — L84 CALLUS OF FOOT: ICD-10-CM

## 2021-12-27 DIAGNOSIS — M79.672 FOOT PAIN, BILATERAL: Primary | ICD-10-CM

## 2021-12-27 DIAGNOSIS — B35.1 ONYCHOMYCOSIS: ICD-10-CM

## 2021-12-27 DIAGNOSIS — R26.2 DIFFICULTY WALKING: ICD-10-CM

## 2021-12-27 DIAGNOSIS — M79.671 FOOT PAIN, BILATERAL: Primary | ICD-10-CM

## 2021-12-27 PROCEDURE — 11057 PARNG/CUTG B9 HYPRKR LES >4: CPT | Performed by: PODIATRIST

## 2021-12-27 PROCEDURE — 11721 DEBRIDE NAIL 6 OR MORE: CPT | Performed by: PODIATRIST

## 2021-12-27 NOTE — PROGRESS NOTES
[facility]    Today's Date: 12/27/21    Patient Name: Carmita Devi  MRN: [id number]  CSN: [id number]  PCP: Joseluis, last PCP visit: 1 November 2021  Referring Provider: No ref. provider found    SUBJECTIVE     Chief Complaint   Patient presents with   • Left Foot - Nail Problem   • Right Foot - Nail Problem     HPI: Carmita Devi, a 51 y.o.female, comes to clinic.    New, Established, New Problem:  Established    Location:  Toenails    Duration:   Greater than five years    Onset:  Gradual    Nature:  sore with palpation.    Stable, worsening, improving:   Stable    Aggravating factors:  Pain with shoe gear and ambulation.    Previous Treatment:  debridement  _______________________________________________________________________    New, Established, New Problem: Established    Location:  hyperkeratotic lesion(s) on right forefoot    Duration: Greater than 1 month    Onset:  gradual    Nature:  sore    Stable, worsening, improving: Recurring    Aggravating factors:  Patient reports lesion(s) is painful with shoegear and ambulation.      Previous Treatment:   Treatment    ______________________________________________________________________    Medical changes: Covid booster    No other pedal complaints at this time.    Patient denies any fevers, chills, nausea, vomiting, shortness of breathe, nor any other constitutional signs nor symptoms.       Past Medical History:   Diagnosis Date   • Allergic rhinitis    • Bilateral foot pain    • Chronic frontal sinusitis    • Chronic maxillary sinusitis    • Corns and callus    • Deviated septum    • Difficulty walking    • Facial basal cell cancer    • GERD (gastroesophageal reflux disease)    • HBP (high blood pressure)    • Headache    • Hoarseness    • Hypertension    • Ingrowing toenail    • Mentally challenged    • Myofascial muscle pain    • Osteoporosis    • Otalgia    • Tinea unguium      Past Surgical History:   Procedure Laterality  Date   • SINUS SURGERY     • SKIN CANCER EXCISION       Family History   Problem Relation Age of Onset   • Skin cancer Father      Social History     Socioeconomic History   • Marital status: Single   Tobacco Use   • Smoking status: Never Smoker   • Smokeless tobacco: Never Used   Vaping Use   • Vaping Use: Never used   Substance and Sexual Activity   • Alcohol use: Never   • Drug use: Never   • Sexual activity: Defer     Allergies   Allergen Reactions   • Alendronate Sodium Other (See Comments)     Severe heartburn     • Sulfa Antibiotics Unknown - Low Severity     Current Outpatient Medications   Medication Sig Dispense Refill   • amLODIPine (NORVASC) 5 MG tablet amlodipine 5 mg oral tablet take 1 tablet (5 mg) by oral route once daily   Active     • aspirin (aspirin) 81 MG EC tablet Aspir-81 81 mg oral tablet,delayed release (DR/EC) take 1 tablet (81 mg) by oral route once daily   Suspended     • azelastine (ASTEPRO) 0.15 % solution nasal spray azelastine 0.15 % (205.5 mcg) nasal spray,non-aerosol spray 1 spray (205.5 mcg) in each nostril by intranasal route 2 times per day   Suspended     • baclofen (LIORESAL) 10 MG tablet baclofen 10 mg oral tablet take 1 tablet (10 mg) by oral route 3 times per day   Active     • buPROPion (WELLBUTRIN) 100 MG tablet bupropion HCl 100 mg oral tablet take 1 tablet (100 mg) by oral route 2 times per day   Suspended     • calcium carbonate (OS-ASHANTI) 1250 (500 Ca) MG tablet Calcium 500 500 mg calcium (1,250 mg) oral tablet take 1 tablet by oral route daily   Suspended     • Cholecalciferol 50 MCG (2000 UT) capsule Vitamin D3 2,000 unit oral capsule take 1 capsule by oral route daily   Active     • conjugated estrogens (Premarin) 0.625 MG/GM vaginal cream 0.5 g.     • denosumab (Prolia) 60 MG/ML solution prefilled syringe syringe 1 mL.     • estrogens, conjugated, (Premarin) 0.625 MG tablet Premarin 0.625 mg oral tablet take 1 tablet (0.625 mg) by oral route once daily   Suspended      • fluticasone (FLONASE) 50 MCG/ACT nasal spray fluticasone 50 mcg/actuation nasal spray,suspension spray 1 spray (50 mcg) in each nostril by intranasal route once daily   Active     • ibuprofen (ADVIL,MOTRIN) 600 MG tablet ibuprofen 600 mg oral tablet take 1 tablet (600 mg) by oral route 3 times per day with food   Suspended     • multivitamin with minerals (CENTRUM SILVER PO) Centrum Silver 0.4-300-250 mg-mcg-mcg oral tablet take 1 tablet by oral route daily   Suspended     • pantoprazole (PROTONIX) 40 MG EC tablet Take 40 mg by mouth 2 (two) times a day.     • Pyrithione Zinc 0.25 % liquid DermaZinc Spray 0.25 % topical spray,non-aerosol apply as directed by topical route once   Active     • XYLITOL MT xylitol sinus spray nasal spray as directed   Suspended       No current facility-administered medications for this visit.     Review of Systems   Constitutional: Negative.    Skin:        Painful toenails and hyperkeratotic lesions.   All other systems reviewed and are negative.      OBJECTIVE     Vitals:    12/27/21 1550   BP: 129/85   Pulse: 95   Temp: 95.9 °F (35.5 °C)   SpO2: 100%       Patient seen in no apparent distress.      PHYSICAL EXAM:     Foot/Ankle Exam:       General:   Appearance: appears stated age and healthy    Orientation: AAOx3    Affect: appropriate    Assistance: cane    Shoe Gear:  Casual shoes    VASCULAR      Right Foot Vascularity   Normal vascular exam    Dorsalis pedis:  2+  Posterior tibial:  2+  Skin Temperature: warm    Edema Grading:  None  CFT:  < 3 seconds  Pedal Hair Growth:  Present  Varicosities: none       Left Foot Vascularity   Normal vascular exam    Dorsalis pedis:  2+  Posterior tibial:  2+  Skin Temperature: warm    Edema Grading:  None  CFT:  < 3 seconds  Pedal Hair Growth:  Present  Varicosities: none        NEUROLOGIC     Right Foot Neurologic   Normal sensation    Light touch sensation:  Normal  Vibratory sensation:  Normal  Hot/Cold sensation: normal    Protective  Sensation using [place]-Vanessa Monofilament:  10     Left Foot Neurologic   Normal sensation    Light touch sensation:  Normal  Vibratory sensation:  Normal  Hot/cold sensation: normal    Protective Sensation using [place]-Vanessa Monofilament:  10     MUSCLE STRENGTH     Right Foot Muscle Strength   Foot dorsiflexion:  4  Foot plantar flexion:  4  Foot inversion:  4  Foot eversion:  4     Left Foot Muscle Strength   Foot dorsiflexion:  4  Foot plantar flexion:  4  Foot inversion:  4  Foot eversion:  4     RANGE OF MOTION      Right Foot Range of Motion   Foot and ankle ROM within normal limits       Left Foot Range of Motion   Foot and ankle ROM within normal limits       DERMATOLOGIC     Right Foot Dermatologic   Skin: skin intact    Nails: onychomycosis, abnormally thick, subungual debris, dystrophic nails and ingrown toenail    Nails comment:  Toenails 1, 2, 3, 4, and 5     Left Foot Dermatologic   Skin: skin intact    Nails: onychomycosis, abnormally thick, subungual debris, dystrophic nails and ingrown toenail    Nails comment:  Toenails 1, 2, 3, 4, and 5      Right Foot Additional Comments 5 hyperkeratotic lesions on plantar forefoot.  Partial thickness debridement with #10 scalpel blade down to health tissue.  No signs of edema, erythema, lymphangitis, nor signs of infection.            ASSESSMENT/PLAN     Diagnoses and all orders for this visit:    1. Foot pain, bilateral (Primary)    2. Onychomycosis    3. Onychocryptosis    4. Difficulty walking    5. Callus of foot        Comprehensive lower extremity examination and evaluation was performed.    Discussed findings and treatment plan including risks, benefits, and treatment options with patient in detail. Patient agreed with treatment plan.    Toenails 1 through 5 bilaterally were debrided in thickness and length and then smoothed with a Dremel Tool.  Tolerated the procedure well without complications.    An After Visit Summary was printed and given to  the patient at discharge, including (if requested) any available informative/educational handouts regarding diagnosis, treatment, or medications. All questions were answered to patient/family satisfaction. Should symptoms fail to improve or worsen they agree to call or return to clinic or to go to the Emergency Department. Discussed the importance of following up with any needed screening tests/labs/specialist appointments and any requested follow-up recommended by me today. Importance of maintaining follow-up discussed and patient accepts that missed appointments can delay diagnosis and potentially lead to worsening of conditions.    Return in about 9 weeks (around 2/28/2022) for Toenail Care., or sooner if acute issues arise.    This document has been electronically signed by Bertin Fountain DPM on December 27, 2021 16:25 EST

## 2022-01-24 ENCOUNTER — HOSPITAL ENCOUNTER (OUTPATIENT)
Dept: MAMMOGRAPHY | Facility: HOSPITAL | Age: 52
Discharge: HOME | End: 2022-01-24

## 2022-01-24 ENCOUNTER — HOSPITAL ENCOUNTER (OUTPATIENT)
Dept: ULTRASOUND IMAGING | Facility: HOSPITAL | Age: 52
Discharge: HOME | End: 2022-01-24

## 2022-01-24 DIAGNOSIS — N63.0 BREAST MASS: ICD-10-CM

## 2022-01-24 PROCEDURE — G0279 TOMOSYNTHESIS, MAMMO: HCPCS

## 2022-01-24 PROCEDURE — 76642 ULTRASOUND BREAST LIMITED: CPT

## 2022-01-24 PROCEDURE — 77065 DX MAMMO INCL CAD UNI: CPT

## 2022-02-11 ENCOUNTER — LAB (OUTPATIENT)
Dept: LAB | Facility: HOSPITAL | Age: 52
End: 2022-02-11

## 2022-02-11 ENCOUNTER — TRANSCRIBE ORDERS (OUTPATIENT)
Dept: LAB | Facility: HOSPITAL | Age: 52
End: 2022-02-11

## 2022-02-11 DIAGNOSIS — D64.9 ANEMIA, UNSPECIFIED TYPE: Primary | ICD-10-CM

## 2022-02-11 DIAGNOSIS — I10 ESSENTIAL HYPERTENSION, MALIGNANT: ICD-10-CM

## 2022-02-11 DIAGNOSIS — D64.9 ANEMIA, UNSPECIFIED TYPE: ICD-10-CM

## 2022-02-11 LAB
ALBUMIN SERPL-MCNC: 4.6 G/DL (ref 3.5–5.2)
ALBUMIN/GLOB SERPL: 2 G/DL
ALP SERPL-CCNC: 152 U/L (ref 39–117)
ALT SERPL W P-5'-P-CCNC: 43 U/L (ref 1–33)
ANION GAP SERPL CALCULATED.3IONS-SCNC: 9.8 MMOL/L (ref 5–15)
AST SERPL-CCNC: 44 U/L (ref 1–32)
BASOPHILS # BLD AUTO: 0.06 10*3/MM3 (ref 0–0.2)
BASOPHILS NFR BLD AUTO: 1 % (ref 0–1.5)
BILIRUB SERPL-MCNC: 0.7 MG/DL (ref 0–1.2)
BUN SERPL-MCNC: 12 MG/DL (ref 6–20)
BUN/CREAT SERPL: 21.8 (ref 7–25)
CALCIUM SPEC-SCNC: 8.6 MG/DL (ref 8.6–10.5)
CHLORIDE SERPL-SCNC: 106 MMOL/L (ref 98–107)
CO2 SERPL-SCNC: 24.2 MMOL/L (ref 22–29)
CREAT SERPL-MCNC: 0.55 MG/DL (ref 0.57–1)
DEPRECATED RDW RBC AUTO: 39.9 FL (ref 37–54)
EOSINOPHIL # BLD AUTO: 0.06 10*3/MM3 (ref 0–0.4)
EOSINOPHIL NFR BLD AUTO: 1 % (ref 0.3–6.2)
ERYTHROCYTE [DISTWIDTH] IN BLOOD BY AUTOMATED COUNT: 13.1 % (ref 12.3–15.4)
GFR SERPL CREATININE-BSD FRML MDRD: 117 ML/MIN/1.73
GLOBULIN UR ELPH-MCNC: 2.3 GM/DL
GLUCOSE SERPL-MCNC: 89 MG/DL (ref 65–99)
HCT VFR BLD AUTO: 46.9 % (ref 34–46.6)
HGB BLD-MCNC: 16.7 G/DL (ref 12–15.9)
IMM GRANULOCYTES # BLD AUTO: 0.02 10*3/MM3 (ref 0–0.05)
IMM GRANULOCYTES NFR BLD AUTO: 0.3 % (ref 0–0.5)
LYMPHOCYTES # BLD AUTO: 1.75 10*3/MM3 (ref 0.7–3.1)
LYMPHOCYTES NFR BLD AUTO: 28.6 % (ref 19.6–45.3)
MCH RBC QN AUTO: 30.4 PG (ref 26.6–33)
MCHC RBC AUTO-ENTMCNC: 35.6 G/DL (ref 31.5–35.7)
MCV RBC AUTO: 85.4 FL (ref 79–97)
MONOCYTES # BLD AUTO: 0.5 10*3/MM3 (ref 0.1–0.9)
MONOCYTES NFR BLD AUTO: 8.2 % (ref 5–12)
NEUTROPHILS NFR BLD AUTO: 3.73 10*3/MM3 (ref 1.7–7)
NEUTROPHILS NFR BLD AUTO: 60.9 % (ref 42.7–76)
NRBC BLD AUTO-RTO: 0 /100 WBC (ref 0–0.2)
PLATELET # BLD AUTO: 375 10*3/MM3 (ref 140–450)
PMV BLD AUTO: 9.8 FL (ref 6–12)
POTASSIUM SERPL-SCNC: 3.9 MMOL/L (ref 3.5–5.2)
PROT SERPL-MCNC: 6.9 G/DL (ref 6–8.5)
RBC # BLD AUTO: 5.49 10*6/MM3 (ref 3.77–5.28)
SODIUM SERPL-SCNC: 140 MMOL/L (ref 136–145)
WBC NRBC COR # BLD: 6.12 10*3/MM3 (ref 3.4–10.8)

## 2022-02-11 PROCEDURE — 36415 COLL VENOUS BLD VENIPUNCTURE: CPT

## 2022-02-11 PROCEDURE — 85025 COMPLETE CBC W/AUTO DIFF WBC: CPT

## 2022-02-11 PROCEDURE — 80053 COMPREHEN METABOLIC PANEL: CPT

## 2022-02-28 ENCOUNTER — OFFICE VISIT (OUTPATIENT)
Dept: PODIATRY | Facility: CLINIC | Age: 52
End: 2022-02-28

## 2022-02-28 VITALS
HEART RATE: 84 BPM | SYSTOLIC BLOOD PRESSURE: 123 MMHG | BODY MASS INDEX: 22.87 KG/M2 | WEIGHT: 106 LBS | DIASTOLIC BLOOD PRESSURE: 78 MMHG | HEIGHT: 57 IN | OXYGEN SATURATION: 100 % | TEMPERATURE: 97.5 F

## 2022-02-28 DIAGNOSIS — M79.671 FOOT PAIN, BILATERAL: Primary | ICD-10-CM

## 2022-02-28 DIAGNOSIS — B35.1 ONYCHOMYCOSIS: ICD-10-CM

## 2022-02-28 DIAGNOSIS — L60.0 ONYCHOCRYPTOSIS: ICD-10-CM

## 2022-02-28 DIAGNOSIS — M79.672 FOOT PAIN, BILATERAL: Primary | ICD-10-CM

## 2022-02-28 DIAGNOSIS — L84 CALLUS OF FOOT: ICD-10-CM

## 2022-02-28 PROCEDURE — 11721 DEBRIDE NAIL 6 OR MORE: CPT | Performed by: PODIATRIST

## 2022-02-28 PROCEDURE — 11055 PARING/CUTG B9 HYPRKER LES 1: CPT | Performed by: PODIATRIST

## 2022-02-28 NOTE — PROGRESS NOTES
[facility]    Today's Date: 02/28/22    Patient Name: Carmita Devi  MRN: [id number]  CSN: [id number]  PCP: Joseluis, last PCP visit: 01 Nov 2021  Referring Provider: No ref. provider found    SUBJECTIVE     Chief Complaint   Patient presents with   • Left Foot - Nail Problem   • Right Foot - Nail Problem     HPI: Carmita Devi, a 52 y.o.female, comes to clinic.    New, Established, New Problem:  Established    Location:  Toenails    Duration:   Greater than five years    Onset:  Gradual    Nature:  sore with palpation.    Stable, worsening, improving:   Stable    Aggravating factors:  Pain with shoe gear and ambulation.    Previous Treatment:  debridement  _______________________________________________________________________    New, Established, New Problem: Established    Location:  hyperkeratotic lesion(s) on right forefoot    Duration: Greater than 1 month    Onset:  gradual    Nature:  sore    Stable, worsening, improving: Recurring    Aggravating factors:  Patient reports lesion(s) is painful with shoegear and ambulation.      Previous Treatment:   Treatment    ______________________________________________________________________    Medical changes: none    Patient denies any fevers, chills, nausea, vomiting, shortness of breathe, nor any other constitutional signs nor symptoms.       Past Medical History:   Diagnosis Date   • Allergic rhinitis    • Bilateral foot pain    • Chronic frontal sinusitis    • Chronic maxillary sinusitis    • Corns and callus    • Deviated septum    • Difficulty walking    • Facial basal cell cancer    • GERD (gastroesophageal reflux disease)    • HBP (high blood pressure)    • Headache    • Hoarseness    • Hypertension    • Ingrowing toenail    • Mentally challenged    • Myofascial muscle pain    • Osteoporosis    • Otalgia    • Tinea unguium      Past Surgical History:   Procedure Laterality Date   • SINUS SURGERY     • SKIN CANCER EXCISION        Family History   Problem Relation Age of Onset   • Skin cancer Father      Social History     Socioeconomic History   • Marital status: Single   Tobacco Use   • Smoking status: Never Smoker   • Smokeless tobacco: Never Used   Vaping Use   • Vaping Use: Never used   Substance and Sexual Activity   • Alcohol use: Never   • Drug use: Never   • Sexual activity: Defer     Allergies   Allergen Reactions   • Alendronate Sodium Other (See Comments)     Severe heartburn     • Sulfa Antibiotics Unknown - Low Severity     Current Outpatient Medications   Medication Sig Dispense Refill   • amLODIPine (NORVASC) 5 MG tablet amlodipine 5 mg oral tablet take 1 tablet (5 mg) by oral route once daily   Active     • aspirin (aspirin) 81 MG EC tablet Aspir-81 81 mg oral tablet,delayed release (DR/EC) take 1 tablet (81 mg) by oral route once daily   Suspended     • azelastine (ASTEPRO) 0.15 % solution nasal spray azelastine 0.15 % (205.5 mcg) nasal spray,non-aerosol spray 1 spray (205.5 mcg) in each nostril by intranasal route 2 times per day   Suspended     • baclofen (LIORESAL) 10 MG tablet baclofen 10 mg oral tablet take 1 tablet (10 mg) by oral route 3 times per day   Active     • buPROPion (WELLBUTRIN) 100 MG tablet bupropion HCl 100 mg oral tablet take 1 tablet (100 mg) by oral route 2 times per day   Suspended     • calcium carbonate (OS-ASHANTI) 1250 (500 Ca) MG tablet Calcium 500 500 mg calcium (1,250 mg) oral tablet take 1 tablet by oral route daily   Suspended     • Cholecalciferol 50 MCG (2000 UT) capsule Vitamin D3 2,000 unit oral capsule take 1 capsule by oral route daily   Active     • conjugated estrogens (Premarin) 0.625 MG/GM vaginal cream 0.5 g.     • denosumab (Prolia) 60 MG/ML solution prefilled syringe syringe 1 mL.     • estrogens, conjugated, (Premarin) 0.625 MG tablet Premarin 0.625 mg oral tablet take 1 tablet (0.625 mg) by oral route once daily   Suspended     • fluticasone (FLONASE) 50 MCG/ACT nasal spray  fluticasone 50 mcg/actuation nasal spray,suspension spray 1 spray (50 mcg) in each nostril by intranasal route once daily   Active     • ibuprofen (ADVIL,MOTRIN) 600 MG tablet ibuprofen 600 mg oral tablet take 1 tablet (600 mg) by oral route 3 times per day with food   Suspended     • multivitamin with minerals (CENTRUM SILVER PO) Centrum Silver 0.4-300-250 mg-mcg-mcg oral tablet take 1 tablet by oral route daily   Suspended     • pantoprazole (PROTONIX) 40 MG EC tablet Take 40 mg by mouth 2 (two) times a day.     • Pyrithione Zinc 0.25 % liquid DermaZinc Spray 0.25 % topical spray,non-aerosol apply as directed by topical route once   Active     • XYLITOL MT xylitol sinus spray nasal spray as directed   Suspended       No current facility-administered medications for this visit.     Review of Systems   Constitutional: Negative.    Skin:        Painful toenails and hyperkeratotic lesions.   All other systems reviewed and are negative.      OBJECTIVE     Vitals:    02/28/22 1412   BP: 123/78   Pulse: 84   Temp: 97.5 °F (36.4 °C)   SpO2: 100%       Patient seen in no apparent distress.      PHYSICAL EXAM:     Foot/Ankle Exam:       General:   Appearance: appears stated age and healthy    Orientation: AAOx3    Affect: appropriate    Assistance: cane    Shoe Gear:  Casual shoes    VASCULAR      Right Foot Vascularity   Normal vascular exam    Dorsalis pedis:  2+  Posterior tibial:  2+  Skin Temperature: warm    Edema Grading:  None  CFT:  < 3 seconds  Pedal Hair Growth:  Present  Varicosities: none       Left Foot Vascularity   Normal vascular exam    Dorsalis pedis:  2+  Posterior tibial:  2+  Skin Temperature: warm    Edema Grading:  None  CFT:  < 3 seconds  Pedal Hair Growth:  Present  Varicosities: none        NEUROLOGIC     Right Foot Neurologic   Normal sensation    Light touch sensation:  Normal  Vibratory sensation:  Normal  Hot/Cold sensation: normal    Protective Sensation using [place]-Vanessa Monofilament:   10     Left Foot Neurologic   Normal sensation    Light touch sensation:  Normal  Vibratory sensation:  Normal  Hot/cold sensation: normal    Protective Sensation using [place]-Vanessa Monofilament:  10     MUSCLE STRENGTH     Right Foot Muscle Strength   Foot dorsiflexion:  4  Foot plantar flexion:  4  Foot inversion:  4  Foot eversion:  4     Left Foot Muscle Strength   Foot dorsiflexion:  4  Foot plantar flexion:  4  Foot inversion:  4  Foot eversion:  4     RANGE OF MOTION      Right Foot Range of Motion   Foot and ankle ROM within normal limits       Left Foot Range of Motion   Foot and ankle ROM within normal limits       DERMATOLOGIC     Right Foot Dermatologic   Skin: skin intact    Nails: onychomycosis, abnormally thick, subungual debris, dystrophic nails and ingrown toenail    Nails comment:  Toenails 1, 2, 3, 4, and 5     Left Foot Dermatologic   Skin: skin intact    Nails: onychomycosis, abnormally thick, subungual debris, dystrophic nails and ingrown toenail    Nails comment:  Toenails 1, 2, 3, 4, and 5      Right Foot Additional Comments 5 hyperkeratotic lesions on plantar forefoot.  Partial thickness debridement with #10 scalpel blade down to health tissue.  No signs of edema, erythema, lymphangitis, nor signs of infection.            ASSESSMENT/PLAN     Diagnoses and all orders for this visit:    1. Foot pain, bilateral (Primary)    2. Onychocryptosis    3. Onychomycosis    4. Callus of foot        Comprehensive lower extremity examination and evaluation was performed.    Discussed findings and treatment plan including risks, benefits, and treatment options with patient in detail. Patient agreed with treatment plan.    Toenails 1 through 5 bilaterally were debrided in thickness and length and then smoothed with a Dremel Tool.  Tolerated the procedure well without complications.    An After Visit Summary was printed and given to the patient at discharge, including (if requested) any available  informative/educational handouts regarding diagnosis, treatment, or medications. All questions were answered to patient/family satisfaction. Should symptoms fail to improve or worsen they agree to call or return to clinic or to go to the Emergency Department. Discussed the importance of following up with any needed screening tests/labs/specialist appointments and any requested follow-up recommended by me today. Importance of maintaining follow-up discussed and patient accepts that missed appointments can delay diagnosis and potentially lead to worsening of conditions.    Return in about 9 weeks (around 5/2/2022) for Toenail Care., or sooner if acute issues arise.    This document has been electronically signed by Bertin Fountain DPM on February 28, 2022 14:24 EST

## 2022-03-31 ENCOUNTER — TELEPHONE (OUTPATIENT)
Dept: ONCOLOGY | Facility: HOSPITAL | Age: 52
End: 2022-03-31

## 2022-03-31 NOTE — TELEPHONE ENCOUNTER
Message left for patients father. Stating that he needs to discuss the need for a hospital bed with their PCP, this is not an order that Dr. Hernandez would give and that the patient is not currently being followed by , her PCP monitoring her CBC.  Left number to return call if he has any questions.

## 2022-03-31 NOTE — TELEPHONE ENCOUNTER
Caller: ALDO DEL CID    Relationship: Father    Best call back number:  [phone]    What is the best time to reach you: ASAP    Who are you requesting to speak with (clinical staff, provider,  specific staff member): CLINICAL STAFF    Do you know the name of the person who called: ALDO    What was the call regarding: PATIENT IS NOT TOLERATING CPAP MACHINE & ALDO STATED DR. BHATIA SUGGESTED A HOSP. BED IF THE CPAP DID NOT WORK.  HE WOULD LIKE TO TALK ABOUT ORDERS FOR A BED.    Do you require a callback: YES, PLEASE

## 2022-05-02 ENCOUNTER — OFFICE VISIT (OUTPATIENT)
Dept: PODIATRY | Facility: CLINIC | Age: 52
End: 2022-05-02

## 2022-05-02 VITALS
HEART RATE: 77 BPM | SYSTOLIC BLOOD PRESSURE: 143 MMHG | BODY MASS INDEX: 22.87 KG/M2 | TEMPERATURE: 97.1 F | OXYGEN SATURATION: 100 % | DIASTOLIC BLOOD PRESSURE: 78 MMHG | HEIGHT: 57 IN | WEIGHT: 106 LBS

## 2022-05-02 DIAGNOSIS — L84 CALLUS OF FOOT: ICD-10-CM

## 2022-05-02 DIAGNOSIS — M79.672 FOOT PAIN, BILATERAL: ICD-10-CM

## 2022-05-02 DIAGNOSIS — R26.2 DIFFICULTY WALKING: Primary | ICD-10-CM

## 2022-05-02 DIAGNOSIS — M79.671 FOOT PAIN, BILATERAL: ICD-10-CM

## 2022-05-02 DIAGNOSIS — L60.0 ONYCHOCRYPTOSIS: ICD-10-CM

## 2022-05-02 DIAGNOSIS — B35.1 ONYCHOMYCOSIS: ICD-10-CM

## 2022-05-02 PROCEDURE — 11721 DEBRIDE NAIL 6 OR MORE: CPT | Performed by: PODIATRIST

## 2022-05-02 PROCEDURE — 11057 PARNG/CUTG B9 HYPRKR LES >4: CPT | Performed by: PODIATRIST

## 2022-05-02 RX ORDER — TRIAMCINOLONE ACETONIDE 1 MG/G
CREAM TOPICAL
COMMUNITY
Start: 2022-04-22

## 2022-05-02 RX ORDER — ESTRADIOL 0.1 MG/G
CREAM VAGINAL
COMMUNITY
Start: 2022-03-02

## 2022-05-02 NOTE — PROGRESS NOTES
[facility] - PODIATRY    Today's Date: 05/02/22    Patient Name: Carmita Devi  MRN: [id number]  CSN: [id number]  PCP: Joseluis, last PCP visit: 3/1/2022  Referring Provider: No ref. provider found    SUBJECTIVE     Chief Complaint   Patient presents with   • Left Foot - Follow-up, Nail Problem   • Right Foot - Follow-up, Nail Problem     HPI: Carmita Devi, a 52 y.o.female, comes to clinic.    New, Established, New Problem:  Established    Location:  Toenails    Duration:   Greater than five years    Onset:  Gradual    Nature:  sore with palpation.    Stable, worsening, improving:   Stable    Aggravating factors:  Pain with shoe gear and ambulation.    Previous Treatment:  debridement  _______________________________________________________________________    New, Established, New Problem: Established    Location:  hyperkeratotic lesion(s) on right forefoot    Duration: Greater than 1 month    Onset:  gradual    Nature:  sore    Stable, worsening, improving: Recurring    Aggravating factors:  Patient reports lesion(s) is painful with shoegear and ambulation.      Previous Treatment:   Treatment  ______________________________________________________________________    Patient relates no medical changes since their last visit.      Patient denies any fevers, chills, nausea, vomiting, shortness of breathe, nor any other constitutional signs nor symptoms.       Past Medical History:   Diagnosis Date   • Allergic rhinitis    • Bilateral foot pain    • Chronic frontal sinusitis    • Chronic maxillary sinusitis    • Corns and callus    • Deviated septum    • Difficulty walking    • Facial basal cell cancer    • GERD (gastroesophageal reflux disease)    • HBP (high blood pressure)    • Headache    • Hoarseness    • Hypertension    • Ingrowing toenail    • Mentally challenged    • Myofascial muscle pain    • Osteoporosis    • Otalgia    • Tinea unguium      Past Surgical History:   Procedure Laterality  Date   • SINUS SURGERY     • SKIN CANCER EXCISION       Family History   Problem Relation Age of Onset   • Skin cancer Father      Social History     Socioeconomic History   • Marital status: Single   Tobacco Use   • Smoking status: Never Smoker   • Smokeless tobacco: Never Used   Vaping Use   • Vaping Use: Never used   Substance and Sexual Activity   • Alcohol use: Never   • Drug use: Never   • Sexual activity: Defer     Allergies   Allergen Reactions   • Alendronate Sodium Other (See Comments)     Severe heartburn     • Sulfa Antibiotics Unknown - Low Severity     Current Outpatient Medications   Medication Sig Dispense Refill   • amLODIPine (NORVASC) 5 MG tablet amlodipine 5 mg oral tablet take 1 tablet (5 mg) by oral route once daily   Active     • aspirin 81 MG EC tablet Aspir-81 81 mg oral tablet,delayed release (DR/EC) take 1 tablet (81 mg) by oral route once daily   Suspended     • azelastine (ASTEPRO) 0.15 % solution nasal spray azelastine 0.15 % (205.5 mcg) nasal spray,non-aerosol spray 1 spray (205.5 mcg) in each nostril by intranasal route 2 times per day   Suspended     • baclofen (LIORESAL) 10 MG tablet baclofen 10 mg oral tablet take 1 tablet (10 mg) by oral route 3 times per day   Active     • buPROPion (WELLBUTRIN) 100 MG tablet bupropion HCl 100 mg oral tablet take 1 tablet (100 mg) by oral route 2 times per day   Suspended     • calcium carbonate (OS-ASHANTI) 1250 (500 Ca) MG tablet Calcium 500 500 mg calcium (1,250 mg) oral tablet take 1 tablet by oral route daily   Suspended     • Cholecalciferol 50 MCG (2000 UT) capsule Vitamin D3 2,000 unit oral capsule take 1 capsule by oral route daily   Active     • conjugated estrogens (Premarin) 0.625 MG/GM vaginal cream 0.5 g.     • denosumab (Prolia) 60 MG/ML solution prefilled syringe syringe 1 mL.     • estradiol (ESTRACE) 0.1 MG/GM vaginal cream      • estrogens, conjugated, (PREMARIN) 0.625 MG tablet Premarin 0.625 mg oral tablet take 1 tablet (0.625  mg) by oral route once daily   Suspended     • fluticasone (FLONASE) 50 MCG/ACT nasal spray fluticasone 50 mcg/actuation nasal spray,suspension spray 1 spray (50 mcg) in each nostril by intranasal route once daily   Active     • ibuprofen (ADVIL,MOTRIN) 600 MG tablet ibuprofen 600 mg oral tablet take 1 tablet (600 mg) by oral route 3 times per day with food   Suspended     • multivitamin with minerals tablet tablet Centrum Silver 0.4-300-250 mg-mcg-mcg oral tablet take 1 tablet by oral route daily   Suspended     • pantoprazole (PROTONIX) 40 MG EC tablet Take 40 mg by mouth 2 (two) times a day.     • Pyrithione Zinc 0.25 % liquid DermaZinc Spray 0.25 % topical spray,non-aerosol apply as directed by topical route once   Active     • triamcinolone (KENALOG) 0.1 % cream      • XYLITOL MT xylitol sinus spray nasal spray as directed   Suspended       No current facility-administered medications for this visit.     Review of Systems   Constitutional: Negative.    Skin:        Painful toenails and hyperkeratotic lesions.   All other systems reviewed and are negative.      OBJECTIVE     Vitals:    05/02/22 1450   BP: 143/78   Pulse: 77   Temp: 97.1 °F (36.2 °C)   SpO2: 100%       Patient seen in no apparent distress.      PHYSICAL EXAM:     Foot/Ankle Exam:       General:   Appearance: appears stated age and healthy    Orientation: AAOx3    Affect: appropriate    Assistance: cane    Shoe Gear:  Casual shoes    VASCULAR      Right Foot Vascularity   Normal vascular exam    Dorsalis pedis:  2+  Posterior tibial:  2+  Skin Temperature: warm    Edema Grading:  None  CFT:  < 3 seconds  Pedal Hair Growth:  Present  Varicosities: none       Left Foot Vascularity   Normal vascular exam    Dorsalis pedis:  2+  Posterior tibial:  2+  Skin Temperature: warm    Edema Grading:  None  CFT:  < 3 seconds  Pedal Hair Growth:  Present  Varicosities: none        NEUROLOGIC     Right Foot Neurologic   Normal sensation    Light touch sensation:   Normal  Vibratory sensation:  Normal  Hot/Cold sensation: normal    Protective Sensation using [place]-Vanessa Monofilament:  10     Left Foot Neurologic   Normal sensation    Light touch sensation:  Normal  Vibratory sensation:  Normal  Hot/cold sensation: normal    Protective Sensation using [place]-Vanessa Monofilament:  10     MUSCLE STRENGTH     Right Foot Muscle Strength   Foot dorsiflexion:  4  Foot plantar flexion:  4  Foot inversion:  4  Foot eversion:  4     Left Foot Muscle Strength   Foot dorsiflexion:  4  Foot plantar flexion:  4  Foot inversion:  4  Foot eversion:  4     RANGE OF MOTION      Right Foot Range of Motion   Foot and ankle ROM within normal limits       Left Foot Range of Motion   Foot and ankle ROM within normal limits       DERMATOLOGIC     Right Foot Dermatologic   Skin: skin intact    Nails: onychomycosis, abnormally thick, subungual debris, dystrophic nails and ingrown toenail    Nails comment:  Toenails 1, 2, 3, 4, and 5     Left Foot Dermatologic   Skin: skin intact    Nails: onychomycosis, abnormally thick, subungual debris, dystrophic nails and ingrown toenail    Nails comment:  Toenails 1, 2, 3, 4, and 5      Right Foot Additional Comments 5 hyperkeratotic lesions on plantar forefoot.  Partial thickness debridement with #10 scalpel blade down to health tissue.  No signs of edema, erythema, lymphangitis, nor signs of infection.            ASSESSMENT/PLAN     Diagnoses and all orders for this visit:    1. Difficulty walking (Primary)    2. Onychomycosis    3. Foot pain, bilateral    4. Callus of foot    5. Onychocryptosis        Comprehensive lower extremity examination and evaluation was performed.    Discussed findings and treatment plan including risks, benefits, and treatment options with patient in detail. Patient agreed with treatment plan.    Toenails 1 through 5 bilaterally were debrided in thickness and length and then smoothed with a Dremel Tool.  Tolerated the  procedure well without complications.    An After Visit Summary was printed and given to the patient at discharge, including (if requested) any available informative/educational handouts regarding diagnosis, treatment, or medications. All questions were answered to patient/family satisfaction. Should symptoms fail to improve or worsen they agree to call or return to clinic or to go to the Emergency Department. Discussed the importance of following up with any needed screening tests/labs/specialist appointments and any requested follow-up recommended by me today. Importance of maintaining follow-up discussed and patient accepts that missed appointments can delay diagnosis and potentially lead to worsening of conditions.    Return in about 9 weeks (around 7/4/2022) for Toenail Care., or sooner if acute issues arise.    This document has been electronically signed by Bertin Fountain DPM on May 2, 2022 15:01 EDT

## 2022-06-17 ENCOUNTER — TRANSCRIBE ORDERS (OUTPATIENT)
Dept: LAB | Facility: HOSPITAL | Age: 52
End: 2022-06-17

## 2022-06-17 ENCOUNTER — LAB (OUTPATIENT)
Dept: LAB | Facility: HOSPITAL | Age: 52
End: 2022-06-17

## 2022-06-17 DIAGNOSIS — Z51.81 ENCOUNTER FOR THERAPEUTIC DRUG MONITORING: ICD-10-CM

## 2022-06-17 DIAGNOSIS — M81.0 OSTEOPOROSIS, POST-MENOPAUSAL: Primary | ICD-10-CM

## 2022-06-17 DIAGNOSIS — I10 ESSENTIAL HYPERTENSION, MALIGNANT: ICD-10-CM

## 2022-06-17 DIAGNOSIS — M81.0 OSTEOPOROSIS, POST-MENOPAUSAL: ICD-10-CM

## 2022-06-17 DIAGNOSIS — I10 ESSENTIAL HYPERTENSION, MALIGNANT: Primary | ICD-10-CM

## 2022-06-17 LAB
25(OH)D3 SERPL-MCNC: 35.3 NG/ML (ref 30–100)
ALBUMIN SERPL-MCNC: 4.6 G/DL (ref 3.5–5.2)
ALBUMIN/GLOB SERPL: 2.1 G/DL
ALP SERPL-CCNC: 153 U/L (ref 39–117)
ALT SERPL W P-5'-P-CCNC: 54 U/L (ref 1–33)
ANION GAP SERPL CALCULATED.3IONS-SCNC: 14 MMOL/L (ref 5–15)
AST SERPL-CCNC: 52 U/L (ref 1–32)
BASOPHILS # BLD AUTO: 0.07 10*3/MM3 (ref 0–0.2)
BASOPHILS NFR BLD AUTO: 1 % (ref 0–1.5)
BILIRUB SERPL-MCNC: 0.8 MG/DL (ref 0–1.2)
BUN SERPL-MCNC: 13 MG/DL (ref 6–20)
BUN/CREAT SERPL: 20.6 (ref 7–25)
CALCIUM SPEC-SCNC: 9.2 MG/DL (ref 8.6–10.5)
CHLORIDE SERPL-SCNC: 103 MMOL/L (ref 98–107)
CO2 SERPL-SCNC: 23 MMOL/L (ref 22–29)
CREAT SERPL-MCNC: 0.63 MG/DL (ref 0.57–1)
DEPRECATED RDW RBC AUTO: 40.6 FL (ref 37–54)
EGFRCR SERPLBLD CKD-EPI 2021: 106.9 ML/MIN/1.73
EOSINOPHIL # BLD AUTO: 0.12 10*3/MM3 (ref 0–0.4)
EOSINOPHIL NFR BLD AUTO: 1.6 % (ref 0.3–6.2)
ERYTHROCYTE [DISTWIDTH] IN BLOOD BY AUTOMATED COUNT: 13.3 % (ref 12.3–15.4)
GLOBULIN UR ELPH-MCNC: 2.2 GM/DL
GLUCOSE SERPL-MCNC: 83 MG/DL (ref 65–99)
HCT VFR BLD AUTO: 46.9 % (ref 34–46.6)
HGB BLD-MCNC: 16.3 G/DL (ref 12–15.9)
IMM GRANULOCYTES # BLD AUTO: 0.02 10*3/MM3 (ref 0–0.05)
IMM GRANULOCYTES NFR BLD AUTO: 0.3 % (ref 0–0.5)
LYMPHOCYTES # BLD AUTO: 1.97 10*3/MM3 (ref 0.7–3.1)
LYMPHOCYTES NFR BLD AUTO: 27.1 % (ref 19.6–45.3)
MAGNESIUM SERPL-MCNC: 2.2 MG/DL (ref 1.6–2.6)
MCH RBC QN AUTO: 29.9 PG (ref 26.6–33)
MCHC RBC AUTO-ENTMCNC: 34.8 G/DL (ref 31.5–35.7)
MCV RBC AUTO: 86.1 FL (ref 79–97)
MONOCYTES # BLD AUTO: 0.58 10*3/MM3 (ref 0.1–0.9)
MONOCYTES NFR BLD AUTO: 8 % (ref 5–12)
NEUTROPHILS NFR BLD AUTO: 4.52 10*3/MM3 (ref 1.7–7)
NEUTROPHILS NFR BLD AUTO: 62 % (ref 42.7–76)
NRBC BLD AUTO-RTO: 0 /100 WBC (ref 0–0.2)
PHOSPHATE SERPL-MCNC: 2.8 MG/DL (ref 2.5–4.5)
PLATELET # BLD AUTO: 356 10*3/MM3 (ref 140–450)
PMV BLD AUTO: 9.9 FL (ref 6–12)
POTASSIUM SERPL-SCNC: 4.4 MMOL/L (ref 3.5–5.2)
PROT SERPL-MCNC: 6.8 G/DL (ref 6–8.5)
RBC # BLD AUTO: 5.45 10*6/MM3 (ref 3.77–5.28)
SODIUM SERPL-SCNC: 140 MMOL/L (ref 136–145)
WBC NRBC COR # BLD: 7.28 10*3/MM3 (ref 3.4–10.8)

## 2022-06-17 PROCEDURE — 85025 COMPLETE CBC W/AUTO DIFF WBC: CPT

## 2022-06-17 PROCEDURE — 84100 ASSAY OF PHOSPHORUS: CPT

## 2022-06-17 PROCEDURE — 80053 COMPREHEN METABOLIC PANEL: CPT

## 2022-06-17 PROCEDURE — 36415 COLL VENOUS BLD VENIPUNCTURE: CPT

## 2022-06-17 PROCEDURE — 83735 ASSAY OF MAGNESIUM: CPT

## 2022-06-17 PROCEDURE — 82306 VITAMIN D 25 HYDROXY: CPT

## 2022-07-05 ENCOUNTER — OFFICE VISIT (OUTPATIENT)
Dept: PODIATRY | Facility: CLINIC | Age: 52
End: 2022-07-05

## 2022-07-05 VITALS
OXYGEN SATURATION: 100 % | HEIGHT: 57 IN | BODY MASS INDEX: 23.13 KG/M2 | HEART RATE: 70 BPM | DIASTOLIC BLOOD PRESSURE: 80 MMHG | SYSTOLIC BLOOD PRESSURE: 167 MMHG | WEIGHT: 107.2 LBS | TEMPERATURE: 96.2 F

## 2022-07-05 DIAGNOSIS — L84 CALLUS OF FOOT: ICD-10-CM

## 2022-07-05 DIAGNOSIS — R26.2 DIFFICULTY WALKING: Primary | ICD-10-CM

## 2022-07-05 DIAGNOSIS — M79.672 FOOT PAIN, BILATERAL: ICD-10-CM

## 2022-07-05 DIAGNOSIS — B35.1 ONYCHOMYCOSIS: ICD-10-CM

## 2022-07-05 DIAGNOSIS — M79.671 FOOT PAIN, BILATERAL: ICD-10-CM

## 2022-07-05 DIAGNOSIS — L60.0 ONYCHOCRYPTOSIS: ICD-10-CM

## 2022-07-05 PROCEDURE — 11057 PARNG/CUTG B9 HYPRKR LES >4: CPT | Performed by: PODIATRIST

## 2022-07-05 PROCEDURE — 11721 DEBRIDE NAIL 6 OR MORE: CPT | Performed by: PODIATRIST

## 2022-07-05 NOTE — PROGRESS NOTES
[facility] - PODIATRY    Today's Date: 07/05/22    Patient Name: Carmita Devi  MRN: [id number]  CSN: [id number]  PCP: Joseluis, last PCP visit: 4/1/2022  Referring Provider: No ref. provider found    SUBJECTIVE     Chief Complaint   Patient presents with   • Left Foot - Nail Problem, Callouses   • Right Foot - Callouses, Nail Problem     HPI: Carmita Devi, a 52 y.o.female, comes to clinic.    New, Established, New Problem:  Established    Location:  Toenails    Duration:   Greater than five years    Onset:  Gradual    Nature:  sore with palpation.    Stable, worsening, improving:   Stable    Aggravating factors:  Pain with shoe gear and ambulation.    Previous Treatment:  debridement  _______________________________________________________________________    New, Established, New Problem: Established    Location:  hyperkeratotic lesion(s) on right forefoot    Duration: Greater than 1 month    Onset:  gradual    Nature:  sore    Stable, worsening, improving: Recurring    Aggravating factors:  Patient reports lesion(s) is painful with shoegear and ambulation.      Previous Treatment:   Treatment  ______________________________________________________________________    Medical changes: None.    Patient denies any fevers, chills, nausea, vomiting, shortness of breathe, nor any other constitutional signs nor symptoms.       Past Medical History:   Diagnosis Date   • Allergic rhinitis    • Bilateral foot pain    • Chronic frontal sinusitis    • Chronic maxillary sinusitis    • Corns and callus    • Deviated septum    • Difficulty walking    • Facial basal cell cancer    • GERD (gastroesophageal reflux disease)    • HBP (high blood pressure)    • Headache    • Hoarseness    • Hypertension    • Ingrowing toenail    • Mentally challenged    • Myofascial muscle pain    • Osteoporosis    • Otalgia    • Tinea unguium      Past Surgical History:   Procedure Laterality Date   • SINUS SURGERY     • SKIN CANCER  EXCISION       Family History   Problem Relation Age of Onset   • Skin cancer Father      Social History     Socioeconomic History   • Marital status: Single   Tobacco Use   • Smoking status: Never Smoker   • Smokeless tobacco: Never Used   Vaping Use   • Vaping Use: Never used   Substance and Sexual Activity   • Alcohol use: Never   • Drug use: Never   • Sexual activity: Defer     Allergies   Allergen Reactions   • Alendronate Sodium Other (See Comments)     Severe heartburn     • Sulfa Antibiotics Unknown - Low Severity     Current Outpatient Medications   Medication Sig Dispense Refill   • amLODIPine (NORVASC) 5 MG tablet amlodipine 5 mg oral tablet take 1 tablet (5 mg) by oral route once daily   Active     • aspirin 81 MG EC tablet Aspir-81 81 mg oral tablet,delayed release (DR/EC) take 1 tablet (81 mg) by oral route once daily   Suspended     • azelastine (ASTEPRO) 0.15 % solution nasal spray azelastine 0.15 % (205.5 mcg) nasal spray,non-aerosol spray 1 spray (205.5 mcg) in each nostril by intranasal route 2 times per day   Suspended     • baclofen (LIORESAL) 10 MG tablet baclofen 10 mg oral tablet take 1 tablet (10 mg) by oral route 3 times per day   Active     • buPROPion (WELLBUTRIN) 100 MG tablet bupropion HCl 100 mg oral tablet take 1 tablet (100 mg) by oral route 2 times per day   Suspended     • calcium carbonate (OS-ASHANTI) 1250 (500 Ca) MG tablet Calcium 500 500 mg calcium (1,250 mg) oral tablet take 1 tablet by oral route daily   Suspended     • Cholecalciferol 50 MCG (2000 UT) capsule Vitamin D3 2,000 unit oral capsule take 1 capsule by oral route daily   Active     • conjugated estrogens (Premarin) 0.625 MG/GM vaginal cream 0.5 g.     • denosumab (Prolia) 60 MG/ML solution prefilled syringe syringe 1 mL.     • estradiol (ESTRACE) 0.1 MG/GM vaginal cream      • estrogens, conjugated, (PREMARIN) 0.625 MG tablet Premarin 0.625 mg oral tablet take 1 tablet (0.625 mg) by oral route once daily   Suspended      • fluticasone (FLONASE) 50 MCG/ACT nasal spray fluticasone 50 mcg/actuation nasal spray,suspension spray 1 spray (50 mcg) in each nostril by intranasal route once daily   Active     • ibuprofen (ADVIL,MOTRIN) 600 MG tablet ibuprofen 600 mg oral tablet take 1 tablet (600 mg) by oral route 3 times per day with food   Suspended     • multivitamin with minerals tablet tablet Centrum Silver 0.4-300-250 mg-mcg-mcg oral tablet take 1 tablet by oral route daily   Suspended     • pantoprazole (PROTONIX) 40 MG EC tablet Take 40 mg by mouth 2 (two) times a day.     • Pyrithione Zinc 0.25 % liquid DermaZinc Spray 0.25 % topical spray,non-aerosol apply as directed by topical route once   Active     • triamcinolone (KENALOG) 0.1 % cream      • XYLITOL MT xylitol sinus spray nasal spray as directed   Suspended       No current facility-administered medications for this visit.     Review of Systems   Constitutional: Negative.    Skin:        Painful toenails and hyperkeratotic lesions.   All other systems reviewed and are negative.      OBJECTIVE     Vitals:    07/05/22 1420   BP: 167/80   Pulse: 70   Temp: 96.2 °F (35.7 °C)   SpO2: 100%       Patient seen in no apparent distress.      PHYSICAL EXAM:     Foot/Ankle Exam:       General:   Appearance: appears stated age and healthy    Orientation: AAOx3    Affect: appropriate    Assistance: cane    Shoe Gear:  Casual shoes    VASCULAR      Right Foot Vascularity   Normal vascular exam    Dorsalis pedis:  2+  Posterior tibial:  2+  Skin Temperature: warm    Edema Grading:  None  CFT:  < 3 seconds  Pedal Hair Growth:  Present  Varicosities: none       Left Foot Vascularity   Normal vascular exam    Dorsalis pedis:  2+  Posterior tibial:  2+  Skin Temperature: warm    Edema Grading:  None  CFT:  < 3 seconds  Pedal Hair Growth:  Present  Varicosities: none        NEUROLOGIC     Right Foot Neurologic   Normal sensation    Light touch sensation:  Normal  Vibratory sensation:   Normal  Hot/Cold sensation: normal    Protective Sensation using [place] Monofilament:  10     Left Foot Neurologic   Normal sensation    Light touch sensation:  Normal  Vibratory sensation:  Normal  Hot/cold sensation: normal    Protective Sensation using [place] Monofilament:  10     MUSCLE STRENGTH     Right Foot Muscle Strength   Foot dorsiflexion:  4  Foot plantar flexion:  4  Foot inversion:  4  Foot eversion:  4     Left Foot Muscle Strength   Foot dorsiflexion:  4  Foot plantar flexion:  4  Foot inversion:  4  Foot eversion:  4     RANGE OF MOTION      Right Foot Range of Motion   Foot and ankle ROM within normal limits       Left Foot Range of Motion   Foot and ankle ROM within normal limits       DERMATOLOGIC     Right Foot Dermatologic   Skin: skin intact    Nails: onychomycosis, abnormally thick, subungual debris, dystrophic nails and ingrown toenail    Nails comment:  Toenails 1, 2, 3, 4, and 5     Left Foot Dermatologic   Skin: skin intact    Nails: onychomycosis, abnormally thick, subungual debris, dystrophic nails and ingrown toenail    Nails comment:  Toenails 1, 2, 3, 4, and 5      Right Foot Additional Comments 5 hyperkeratotic lesions on plantar forefoot.  Partial thickness debridement with #10 scalpel blade down to health tissue.  No signs of edema, erythema, lymphangitis, nor signs of infection.            ASSESSMENT/PLAN     Diagnoses and all orders for this visit:    1. Difficulty walking (Primary)    2. Foot pain, bilateral    3. Onychocryptosis    4. Callus of foot    5. Onychomycosis        Comprehensive lower extremity examination and evaluation was performed.    Discussed findings and treatment plan including risks, benefits, and treatment options with patient in detail. Patient agreed with treatment plan.    Toenails 1 through 5 bilaterally were debrided in thickness and length and then smoothed with a Dremel Tool.  Tolerated the procedure well without  complications.    An After Visit Summary was printed and given to the patient at discharge, including (if requested) any available informative/educational handouts regarding diagnosis, treatment, or medications. All questions were answered to patient/family satisfaction. Should symptoms fail to improve or worsen they agree to call or return to clinic or to go to the Emergency Department. Discussed the importance of following up with any needed screening tests/labs/specialist appointments and any requested follow-up recommended by me today. Importance of maintaining follow-up discussed and patient accepts that missed appointments can delay diagnosis and potentially lead to worsening of conditions.    Return in about 9 weeks (around 9/6/2022) for Toenail Care, Callus f/u., or sooner if acute issues arise.    This document has been electronically signed by Bertin Fountain DPM on July 5, 2022 15:01 EDT

## 2022-09-13 ENCOUNTER — OFFICE VISIT (OUTPATIENT)
Dept: PODIATRY | Facility: CLINIC | Age: 52
End: 2022-09-13

## 2022-09-13 VITALS
OXYGEN SATURATION: 100 % | TEMPERATURE: 97.3 F | SYSTOLIC BLOOD PRESSURE: 146 MMHG | HEIGHT: 57 IN | BODY MASS INDEX: 23.08 KG/M2 | DIASTOLIC BLOOD PRESSURE: 86 MMHG | WEIGHT: 107 LBS | HEART RATE: 77 BPM

## 2022-09-13 DIAGNOSIS — B35.1 ONYCHOMYCOSIS: ICD-10-CM

## 2022-09-13 DIAGNOSIS — M79.671 FOOT PAIN, BILATERAL: ICD-10-CM

## 2022-09-13 DIAGNOSIS — M79.672 FOOT PAIN, BILATERAL: ICD-10-CM

## 2022-09-13 DIAGNOSIS — R26.2 DIFFICULTY WALKING: Primary | ICD-10-CM

## 2022-09-13 DIAGNOSIS — L84 CALLUS OF FOOT: ICD-10-CM

## 2022-09-13 DIAGNOSIS — L60.0 ONYCHOCRYPTOSIS: ICD-10-CM

## 2022-09-13 PROCEDURE — 11057 PARNG/CUTG B9 HYPRKR LES >4: CPT | Performed by: PODIATRIST

## 2022-09-13 PROCEDURE — 11721 DEBRIDE NAIL 6 OR MORE: CPT | Performed by: PODIATRIST

## 2022-09-13 RX ORDER — KETOCONAZOLE 20 MG/G
1 CREAM TOPICAL 2 TIMES DAILY
COMMUNITY
Start: 2022-07-26

## 2022-09-13 NOTE — PROGRESS NOTES
[facility]    Today's Date: 09/13/22    Patient Name: Carmita Devi  MRN: [id number]  CSN: [id number]  PCP: Joseluis, last PCP visit: 7/5/2022  Referring Provider: No ref. provider found    SUBJECTIVE     Chief Complaint   Patient presents with   • Left Foot - Nail Problem   • Right Foot - Nail Problem     HPI: Carmita Devi, a 52 y.o.female, comes to clinic.    New, Established, New Problem:  Established    Location:  Toenails    Duration:   Greater than five years    Onset:  Gradual    Nature:  sore with palpation.    Stable, worsening, improving:   Stable    Aggravating factors:  Pain with shoe gear and ambulation.    Previous Treatment:  debridement  _______________________________________________________________________    New, Established, New Problem: Established    Location:  hyperkeratotic lesion(s) on right forefoot    Duration: Greater than 1 month    Onset:  gradual    Nature:  sore    Stable, worsening, improving: Recurring    Aggravating factors:  Patient reports lesion(s) is painful with shoegear and ambulation.      Previous Treatment:   Debridement  ______________________________________________________________________    Patient relates no medical changes since their last visit.    Patient denies any fevers, chills, nausea, vomiting, shortness of breathe, nor any other constitutional signs nor symptoms.       Past Medical History:   Diagnosis Date   • Allergic rhinitis    • Bilateral foot pain    • Chronic frontal sinusitis    • Chronic maxillary sinusitis    • Corns and callus    • Deviated septum    • Difficulty walking    • Facial basal cell cancer    • GERD (gastroesophageal reflux disease)    • HBP (high blood pressure)    • Headache    • Hoarseness    • Hypertension    • Ingrowing toenail    • Mentally challenged    • Myofascial muscle pain    • Osteoporosis    • Otalgia    • Tinea unguium      Past Surgical History:   Procedure Laterality Date   • SINUS SURGERY      • SKIN CANCER EXCISION       Family History   Problem Relation Age of Onset   • Skin cancer Father      Social History     Socioeconomic History   • Marital status: Single   Tobacco Use   • Smoking status: Never Smoker   • Smokeless tobacco: Never Used   Vaping Use   • Vaping Use: Never used   Substance and Sexual Activity   • Alcohol use: Never   • Drug use: Never   • Sexual activity: Defer     Allergies   Allergen Reactions   • Alendronate Sodium Other (See Comments)     Severe heartburn     • Sulfa Antibiotics Unknown - Low Severity     Current Outpatient Medications   Medication Sig Dispense Refill   • amLODIPine (NORVASC) 5 MG tablet amlodipine 5 mg oral tablet take 1 tablet (5 mg) by oral route once daily   Active     • aspirin 81 MG EC tablet Aspir-81 81 mg oral tablet,delayed release (DR/EC) take 1 tablet (81 mg) by oral route once daily   Suspended     • azelastine (ASTEPRO) 0.15 % solution nasal spray azelastine 0.15 % (205.5 mcg) nasal spray,non-aerosol spray 1 spray (205.5 mcg) in each nostril by intranasal route 2 times per day   Suspended     • baclofen (LIORESAL) 10 MG tablet baclofen 10 mg oral tablet take 1 tablet (10 mg) by oral route 3 times per day   Active     • buPROPion (WELLBUTRIN) 100 MG tablet bupropion HCl 100 mg oral tablet take 1 tablet (100 mg) by oral route 2 times per day   Suspended     • calcium carbonate (OS-ASHANTI) 1250 (500 Ca) MG tablet Calcium 500 500 mg calcium (1,250 mg) oral tablet take 1 tablet by oral route daily   Suspended     • Cholecalciferol 50 MCG (2000 UT) capsule Vitamin D3 2,000 unit oral capsule take 1 capsule by oral route daily   Active     • conjugated estrogens (Premarin) 0.625 MG/GM vaginal cream 0.5 g.     • denosumab (Prolia) 60 MG/ML solution prefilled syringe syringe 1 mL.     • estradiol (ESTRACE) 0.1 MG/GM vaginal cream      • estrogens, conjugated, (PREMARIN) 0.625 MG tablet Premarin 0.625 mg oral tablet take 1 tablet (0.625 mg) by oral route once  daily   Suspended     • fluticasone (FLONASE) 50 MCG/ACT nasal spray fluticasone 50 mcg/actuation nasal spray,suspension spray 1 spray (50 mcg) in each nostril by intranasal route once daily   Active     • ibuprofen (ADVIL,MOTRIN) 600 MG tablet ibuprofen 600 mg oral tablet take 1 tablet (600 mg) by oral route 3 times per day with food   Suspended     • ketoconazole (NIZORAL) 2 % cream Apply 1 application topically to the appropriate area as directed 2 (Two) Times a Day. APPLY TO AFFECTED AREA     • multivitamin with minerals tablet tablet Centrum Silver 0.4-300-250 mg-mcg-mcg oral tablet take 1 tablet by oral route daily   Suspended     • pantoprazole (PROTONIX) 40 MG EC tablet Take 40 mg by mouth 2 (two) times a day.     • Pyrithione Zinc 0.25 % liquid DermaZinc Spray 0.25 % topical spray,non-aerosol apply as directed by topical route once   Active     • triamcinolone (KENALOG) 0.1 % cream      • XYLITOL MT xylitol sinus spray nasal spray as directed   Suspended       No current facility-administered medications for this visit.     Review of Systems   Constitutional: Negative.    Skin:        Painful toenails and hyperkeratotic lesions.   All other systems reviewed and are negative.      OBJECTIVE     Vitals:    09/13/22 1418   BP: 146/86   Pulse: 77   Temp: 97.3 °F (36.3 °C)   SpO2: 100%       Patient seen in no apparent distress.      PHYSICAL EXAM:     Foot/Ankle Exam:       General:   Appearance: appears stated age and healthy    Orientation: AAOx3    Affect: appropriate    Assistance: cane    Shoe Gear:  Casual shoes    VASCULAR      Right Foot Vascularity   Normal vascular exam    Dorsalis pedis:  2+  Posterior tibial:  2+  Skin Temperature: warm    Edema Grading:  None  CFT:  < 3 seconds  Pedal Hair Growth:  Present  Varicosities: none       Left Foot Vascularity   Normal vascular exam    Dorsalis pedis:  2+  Posterior tibial:  2+  Skin Temperature: warm    Edema Grading:  None  CFT:  < 3 seconds  Pedal Hair  Growth:  Present  Varicosities: none        NEUROLOGIC     Right Foot Neurologic   Normal sensation    Light touch sensation:  Normal  Vibratory sensation:  Normal  Hot/Cold sensation: normal    Protective Sensation using [place]-Vanessa Monofilament:  10     Left Foot Neurologic   Normal sensation    Light touch sensation:  Normal  Vibratory sensation:  Normal  Hot/cold sensation: normal    Protective Sensation using [place]-Van[place] Monofilament:  10     MUSCLE STRENGTH     Right Foot Muscle Strength   Foot dorsiflexion:  4  Foot plantar flexion:  4  Foot inversion:  4  Foot eversion:  4     Left Foot Muscle Strength   Foot dorsiflexion:  4  Foot plantar flexion:  4  Foot inversion:  4  Foot eversion:  4     RANGE OF MOTION      Right Foot Range of Motion   Foot and ankle ROM within normal limits       Left Foot Range of Motion   Foot and ankle ROM within normal limits       DERMATOLOGIC     Right Foot Dermatologic   Skin: skin intact    Nails: onychomycosis, abnormally thick, subungual debris, dystrophic nails and ingrown toenail    Nails comment:  Toenails 1, 2, 3, 4, and 5     Left Foot Dermatologic   Skin: skin intact    Nails: onychomycosis, abnormally thick, subungual debris, dystrophic nails and ingrown toenail    Nails comment:  Toenails 1, 2, 3, 4, and 5      Right Foot Additional Comments 5 hyperkeratotic lesions on plantar forefoot.  Partial thickness debridement with #10 scalpel blade down to health tissue.  No signs of edema, erythema, lymphangitis, nor signs of infection.            ASSESSMENT/PLAN     Diagnoses and all orders for this visit:    1. Difficulty walking (Primary)    2. Callus of foot    3. Foot pain, bilateral    4. Onychomycosis    5. Onychocryptosis        Comprehensive lower extremity examination and evaluation was performed.    Discussed findings and treatment plan including risks, benefits, and treatment options with patient in detail. Patient agreed with treatment  plan.    Toenails 1 through 5 bilaterally were debrided in thickness and length and then smoothed with a Dremel Tool.  Tolerated the procedure well without complications.    An After Visit Summary was printed and given to the patient at discharge, including (if requested) any available informative/educational handouts regarding diagnosis, treatment, or medications. All questions were answered to patient/family satisfaction. Should symptoms fail to improve or worsen they agree to call or return to clinic or to go to the Emergency Department. Discussed the importance of following up with any needed screening tests/labs/specialist appointments and any requested follow-up recommended by me today. Importance of maintaining follow-up discussed and patient accepts that missed appointments can delay diagnosis and potentially lead to worsening of conditions.    Return in about 9 weeks (around 11/15/2022) for Toenail Care., or sooner if acute issues arise.    This document has been electronically signed by Betrin Fountain DPM on September 13, 2022 14:57 EDT

## 2022-10-24 ENCOUNTER — OFFICE VISIT (OUTPATIENT)
Dept: PODIATRY | Facility: CLINIC | Age: 52
End: 2022-10-24

## 2022-10-24 VITALS
OXYGEN SATURATION: 100 % | WEIGHT: 107 LBS | TEMPERATURE: 97.5 F | HEIGHT: 57 IN | HEART RATE: 111 BPM | SYSTOLIC BLOOD PRESSURE: 174 MMHG | BODY MASS INDEX: 23.08 KG/M2 | DIASTOLIC BLOOD PRESSURE: 101 MMHG

## 2022-10-24 DIAGNOSIS — L60.0 ONYCHOCRYPTOSIS: Primary | ICD-10-CM

## 2022-10-24 DIAGNOSIS — L03.032 PARONYCHIA OF TOE OF LEFT FOOT: ICD-10-CM

## 2022-10-24 DIAGNOSIS — R26.2 DIFFICULTY WALKING: ICD-10-CM

## 2022-10-24 DIAGNOSIS — M79.672 FOOT PAIN, LEFT: ICD-10-CM

## 2022-10-24 PROCEDURE — 99213 OFFICE O/P EST LOW 20 MIN: CPT | Performed by: PODIATRIST

## 2022-10-24 PROCEDURE — 11750 EXCISION NAIL&NAIL MATRIX: CPT | Performed by: PODIATRIST

## 2022-10-24 NOTE — PROGRESS NOTES
[facility] - PODIATRY    Today's Date: 10/24/22    Patient Name: Carmita Devi  MRN: [id number]  CSN: [id number]  PCP: Josue Huggins MD  Referring Provider: No ref. provider found    SUBJECTIVE     Chief Complaint   Patient presents with   • Left Foot - Ingrown Toenail     P&A procedure Left great toenail     HPI: Carmita Devi, a 52 y.o.female, comes to clinic.    New, Established, New Problem:  New problem  Location:  Medial and lateral borders of left first toe  Duration:   Greater than 1 week  Onset:  Gradual  Nature:  sore with palpation.  Stable, worsening, improving:   Slowly worsening  Aggravating factors:  Pain with shoe gear and ambulation.  Previous Treatment:  Debridement    No other pedal complaints at this time.    Patient denies any fevers, chills, nausea, vomiting, shortness of breath, nor any other constitutional signs nor symptoms.       Past Medical History:   Diagnosis Date   • Allergic rhinitis    • Bilateral foot pain    • Chronic frontal sinusitis    • Chronic maxillary sinusitis    • Corns and callus    • Deviated septum    • Difficulty walking    • Facial basal cell cancer    • GERD (gastroesophageal reflux disease)    • HBP (high blood pressure)    • Headache    • Hoarseness    • Hypertension    • Ingrowing toenail    • Mentally challenged    • Myofascial muscle pain    • Osteoporosis    • Otalgia    • Tinea unguium      Past Surgical History:   Procedure Laterality Date   • SINUS SURGERY     • SKIN CANCER EXCISION       Family History   Problem Relation Age of Onset   • Skin cancer Father    • Cancer Father         basil cell skin cancer     Social History     Socioeconomic History   • Marital status: Single   Tobacco Use   • Smoking status: Never   • Smokeless tobacco: Never   Vaping Use   • Vaping Use: Never used   Substance and Sexual Activity   • Alcohol use: Never   • Drug use: Never   • Sexual activity: Never     Allergies   Allergen Reactions   •  Alendronate Sodium Other (See Comments)     Severe heartburn     • Sulfa Antibiotics Unknown - Low Severity     Current Outpatient Medications   Medication Sig Dispense Refill   • baclofen (LIORESAL) 10 MG tablet baclofen 10 mg oral tablet take 1 tablet (10 mg) by oral route 3 times per day   Active     • Cholecalciferol 50 MCG (2000 UT) capsule Vitamin D3 2,000 unit oral capsule take 1 capsule by oral route daily   Active     • denosumab (Prolia) 60 MG/ML solution prefilled syringe syringe 1 mL.     • estradiol (ESTRACE) 0.1 MG/GM vaginal cream      • estrogens, conjugated, (PREMARIN) 0.625 MG tablet Premarin 0.625 mg oral tablet take 1 tablet (0.625 mg) by oral route once daily   Suspended     • fluticasone (FLONASE) 50 MCG/ACT nasal spray fluticasone 50 mcg/actuation nasal spray,suspension spray 1 spray (50 mcg) in each nostril by intranasal route once daily   Active     • ketoconazole (NIZORAL) 2 % cream Apply 1 application topically to the appropriate area as directed 2 (Two) Times a Day. APPLY TO AFFECTED AREA     • pantoprazole (PROTONIX) 40 MG EC tablet Take 40 mg by mouth 2 (two) times a day.     • Pyrithione Zinc 0.25 % liquid DermaZinc Spray 0.25 % topical spray,non-aerosol apply as directed by topical route once   Active     • triamcinolone (KENALOG) 0.1 % cream      • XYLITOL MT xylitol sinus spray nasal spray as directed   Suspended       No current facility-administered medications for this visit.     Review of Systems   Constitutional: Negative.    Skin:        Painful Left in-grown toenail   All other systems reviewed and are negative.      OBJECTIVE     Vitals:    10/24/22 1428   BP: (!) 174/101   Pulse: 111   Temp: 97.5 °F (36.4 °C)   SpO2: 100%       PHYSICAL EXAM  GEN:   Accompanied by mother.     Foot/Ankle Exam:       General:   Appearance: appears stated age and healthy    Orientation: AAOx3    Affect: appropriate    Assistance: walker      VASCULAR      Right Foot Vascularity   Normal  vascular exam    Dorsalis pedis:  2+  Posterior tibial:  2+  Skin Temperature: warm    Edema Grading:  None  CFT:  < 3 seconds  Pedal Hair Growth:  Absent  Varicosities: mild varicosities       Left Foot Vascularity   Normal vascular exam    Dorsalis pedis:  2+  Posterior tibial:  2+  Skin Temperature: warm    Edema Grading:  None  CFT:  < 3 seconds  Pedal Hair Growth:  Absent  Varicosities: mild varicosities        NEUROLOGIC     Right Foot Neurologic   Normal sensation    Light touch sensation:  Normal  Vibratory sensation:  Normal  Hot/Cold sensation: normal       Left Foot Neurologic   Normal sensation    Light touch sensation:  Normal  Vibratory sensation:  Normal  Hot/cold sensation: normal       MUSCLE STRENGTH     Right Foot Muscle Strength   Foot dorsiflexion:  4  Foot plantar flexion:  4  Foot inversion:  4  Foot eversion:  4     Left Foot Muscle Strength   Foot dorsiflexion:  4  Foot plantar flexion:  4  Foot inversion:  4  Foot eversion:  4     RANGE OF MOTION      Right Foot Range of Motion   Foot and ankle ROM within normal limits       Left Foot Range of Motion   Foot and ankle ROM within normal limits       DERMATOLOGIC     Right Foot Dermatologic   Skin: skin intact    Nails: normal       Left Foot Dermatologic   Skin: skin intact    Nails: ingrown toenail and paronychia    Nails comment:  Left 1st bilateral nail borders      ASSESSMENT/PLAN     Diagnoses and all orders for this visit:    1. Onychocryptosis (Primary)    2. Paronychia of toe of left foot    3. Foot pain, left    4. Difficulty walking      Comprehensive lower extremity examination and evaluation was performed.    Discussed findings and treatment plan including risks, benefits, and treatment options with patient in detail. Patient agreed with treatment plan.    Phenol and Alcohol Chemical Matrixectomy Procedure - This procedure is indicated for onychocryptosis of the medial and lateral borders of the left first toe.  Indications, risks  and benefits and alternative treatments have been discussed with this patient who has agreed to this procedure. The area was sterilely prepped with a povidone-iodine solution. The affected area was locally anesthetized with 3 ml, of lidocaine 1%. The offending nail plate was completely excised.  Next 3 applications of 89% phenol were applied to the matrix area x 30 seconds followed by irrigation with copious amounts of isopropyl alcohol.  A sterile dressing was applied. The patient tolerated the procedure well.     Patient was given post procedure care instructions.  The patient states understanding and agreement with this plan.    An After Visit Summary was printed and given to the patient at discharge, including (if requested) any available informative/educational handouts regarding diagnosis, treatment, or medications. All questions were answered to patient/family satisfaction. Should symptoms fail to improve or worsen they agree to call or return to clinic or to go to the Emergency Department. Discussed the importance of following up with any needed screening tests/labs/specialist appointments and any requested follow-up recommended by me today. Importance of maintaining follow-up discussed and patient accepts that missed appointments can delay diagnosis and potentially lead to worsening of conditions.    Return in about 2 weeks (around 11/7/2022) for Post-Procedure., or sooner if acute issues arise.    This document has been electronically signed by Bertin Fountain DPM on October 24, 2022 14:55 EDT

## 2022-11-08 ENCOUNTER — OFFICE VISIT (OUTPATIENT)
Dept: PODIATRY | Facility: CLINIC | Age: 52
End: 2022-11-08

## 2022-11-08 VITALS
OXYGEN SATURATION: 100 % | BODY MASS INDEX: 22.65 KG/M2 | WEIGHT: 105 LBS | DIASTOLIC BLOOD PRESSURE: 74 MMHG | SYSTOLIC BLOOD PRESSURE: 142 MMHG | HEIGHT: 57 IN | HEART RATE: 91 BPM | TEMPERATURE: 97.5 F

## 2022-11-08 DIAGNOSIS — M79.672 FOOT PAIN, LEFT: ICD-10-CM

## 2022-11-08 DIAGNOSIS — L03.032 PARONYCHIA OF TOE OF LEFT FOOT: ICD-10-CM

## 2022-11-08 DIAGNOSIS — L60.0 ONYCHOCRYPTOSIS: Primary | ICD-10-CM

## 2022-11-08 PROCEDURE — 99212 OFFICE O/P EST SF 10 MIN: CPT | Performed by: PODIATRIST

## 2022-11-08 NOTE — PROGRESS NOTES
[facility] - PODIATRY    Today's Date: 11/08/22    Patient Name: Carmita Devi  MRN: [id number]  CSN: [id number]  PCP: Josue Huggins MD  Referring Provider: No ref. provider found    SUBJECTIVE     Chief Complaint   Patient presents with   • Left Foot - Follow-up, Ingrown Toenail     HPI: Carmita Devi, a 52 y.o.female, comes to clinic.    New, Established, New Problem:  Established  Location:  Medial and lateral borders of left first toe  Duration:   Greater than 1 week  Onset:  Gradual  Nature:  sore with palpation.  Stable, worsening, improving:   Improving  Aggravating factors:  Pain with shoe gear and ambulation.  Previous Treatment:  Chemical matrixectomy to affected nail borders    Patient denies any fevers, chills, nausea, vomiting, shortness of breath, nor any other constitutional signs nor symptoms.       Past Medical History:   Diagnosis Date   • Allergic rhinitis    • Bilateral foot pain    • Chronic frontal sinusitis    • Chronic maxillary sinusitis    • Corns and callus    • Deviated septum    • Difficulty walking    • Facial basal cell cancer    • GERD (gastroesophageal reflux disease)    • HBP (high blood pressure)    • Headache    • Hoarseness    • Hypertension    • Ingrowing toenail    • Mentally challenged    • Myofascial muscle pain    • Osteoporosis    • Otalgia    • Tinea unguium      Past Surgical History:   Procedure Laterality Date   • FOOT SURGERY  NOVEMBER 2022   • SINUS SURGERY     • SKIN CANCER EXCISION       Family History   Problem Relation Age of Onset   • Skin cancer Father    • Cancer Father         basil cell skin cancer     Social History     Socioeconomic History   • Marital status: Single   Tobacco Use   • Smoking status: Never   • Smokeless tobacco: Never   Vaping Use   • Vaping Use: Never used   Substance and Sexual Activity   • Alcohol use: Never   • Drug use: Never   • Sexual activity: Never     Allergies   Allergen Reactions   • Alendronate  Sodium Other (See Comments)     Severe heartburn     • Sulfa Antibiotics Unknown - Low Severity     Current Outpatient Medications   Medication Sig Dispense Refill   • baclofen (LIORESAL) 10 MG tablet baclofen 10 mg oral tablet take 1 tablet (10 mg) by oral route 3 times per day   Active     • Cholecalciferol 50 MCG (2000 UT) capsule Vitamin D3 2,000 unit oral capsule take 1 capsule by oral route daily   Active     • denosumab (Prolia) 60 MG/ML solution prefilled syringe syringe 1 mL.     • estradiol (ESTRACE) 0.1 MG/GM vaginal cream      • estrogens, conjugated, (PREMARIN) 0.625 MG tablet Premarin 0.625 mg oral tablet take 1 tablet (0.625 mg) by oral route once daily   Suspended     • fluticasone (FLONASE) 50 MCG/ACT nasal spray fluticasone 50 mcg/actuation nasal spray,suspension spray 1 spray (50 mcg) in each nostril by intranasal route once daily   Active     • ketoconazole (NIZORAL) 2 % cream Apply 1 application topically to the appropriate area as directed 2 (Two) Times a Day. APPLY TO AFFECTED AREA     • pantoprazole (PROTONIX) 40 MG EC tablet Take 40 mg by mouth 2 (two) times a day.     • Pyrithione Zinc 0.25 % liquid DermaZinc Spray 0.25 % topical spray,non-aerosol apply as directed by topical route once   Active     • triamcinolone (KENALOG) 0.1 % cream      • XYLITOL MT xylitol sinus spray nasal spray as directed   Suspended       No current facility-administered medications for this visit.     Review of Systems   Constitutional: Negative.    Skin:        Follow-up for painful Left in-grown toenail   All other systems reviewed and are negative.      OBJECTIVE     Vitals:    11/08/22 1333   BP: 142/74   Pulse: 91   Temp: 97.5 °F (36.4 °C)   SpO2: 100%       PHYSICAL EXAM  GEN:   Accompanied by mother.     Foot/Ankle Exam:       General:   Appearance: appears stated age and healthy    Orientation: AAOx3    Affect: appropriate    Assistance: walker      VASCULAR      Right Foot Vascularity   Normal vascular  exam    Dorsalis pedis:  2+  Posterior tibial:  2+  Skin Temperature: warm    Edema Grading:  None  CFT:  < 3 seconds  Pedal Hair Growth:  Absent  Varicosities: mild varicosities       Left Foot Vascularity   Normal vascular exam    Dorsalis pedis:  2+  Posterior tibial:  2+  Skin Temperature: warm    Edema Grading:  None  CFT:  < 3 seconds  Pedal Hair Growth:  Absent  Varicosities: mild varicosities        NEUROLOGIC     Right Foot Neurologic   Normal sensation    Light touch sensation:  Normal  Vibratory sensation:  Normal  Hot/Cold sensation: normal       Left Foot Neurologic   Normal sensation    Light touch sensation:  Normal  Vibratory sensation:  Normal  Hot/cold sensation: normal       MUSCLE STRENGTH     Right Foot Muscle Strength   Foot dorsiflexion:  4  Foot plantar flexion:  4  Foot inversion:  4  Foot eversion:  4     Left Foot Muscle Strength   Foot dorsiflexion:  4  Foot plantar flexion:  4  Foot inversion:  4  Foot eversion:  4     RANGE OF MOTION      Right Foot Range of Motion   Foot and ankle ROM within normal limits       Left Foot Range of Motion   Foot and ankle ROM within normal limits       DERMATOLOGIC     Right Foot Dermatologic   Skin: skin intact    Nails: normal       Left Foot Dermatologic   Skin: skin intact    Nails: no ingrown toenail and no paronychia    Nails comment:  Left 1st bilateral nail borders; healing without complications      ASSESSMENT/PLAN     Diagnoses and all orders for this visit:    1. Onychocryptosis (Primary)    2. Paronychia of toe of left foot    3. Foot pain, left      Comprehensive lower extremity examination and evaluation was performed.    Discussed findings and treatment plan including risks, benefits, and treatment options with patient in detail. Patient agreed with treatment plan.    Patient is to monitor for recurrence and any new symptoms and to contact Dr. Fountain's office for a follow-up appointment.      The patient states understanding and  agreement with this plan.    An After Visit Summary was printed and given to the patient at discharge, including (if requested) any available informative/educational handouts regarding diagnosis, treatment, or medications. All questions were answered to patient/family satisfaction. Should symptoms fail to improve or worsen they agree to call or return to clinic or to go to the Emergency Department. Discussed the importance of following up with any needed screening tests/labs/specialist appointments and any requested follow-up recommended by me today. Importance of maintaining follow-up discussed and patient accepts that missed appointments can delay diagnosis and potentially lead to worsening of conditions.    Return if symptoms worsen or fail to improve for this condition.., or sooner if acute issues arise.    This document has been electronically signed by Bertin Fountain DPM on November 8, 2022 13:41 EST

## 2022-11-15 ENCOUNTER — OFFICE VISIT (OUTPATIENT)
Dept: PODIATRY | Facility: CLINIC | Age: 52
End: 2022-11-15

## 2022-11-15 VITALS
BODY MASS INDEX: 23.08 KG/M2 | SYSTOLIC BLOOD PRESSURE: 154 MMHG | HEART RATE: 76 BPM | TEMPERATURE: 96.7 F | HEIGHT: 57 IN | OXYGEN SATURATION: 100 % | WEIGHT: 107 LBS | DIASTOLIC BLOOD PRESSURE: 85 MMHG

## 2022-11-15 DIAGNOSIS — M79.671 FOOT PAIN, BILATERAL: ICD-10-CM

## 2022-11-15 DIAGNOSIS — B35.1 ONYCHOMYCOSIS: ICD-10-CM

## 2022-11-15 DIAGNOSIS — L60.0 ONYCHOCRYPTOSIS: Primary | ICD-10-CM

## 2022-11-15 DIAGNOSIS — M79.672 FOOT PAIN, BILATERAL: ICD-10-CM

## 2022-11-15 DIAGNOSIS — R26.2 DIFFICULTY WALKING: ICD-10-CM

## 2022-11-15 PROCEDURE — 11721 DEBRIDE NAIL 6 OR MORE: CPT | Performed by: PODIATRIST

## 2022-11-15 NOTE — PROGRESS NOTES
[facility] - POD[facility]AT[facility]    Today's Date: 11/15/22    Patient Name: Carmita Devi  MRN: [id number]  CSN: [id number]  PCP: Joseluis, last PCP visit: 7/5/2022  Referring Provider: No ref. provider found    SUBJECTIVE     Chief Complaint   Patient presents with   • Left Foot - Follow-up, Nail Problem   • Right Foot - Follow-up, Nail Problem     HPI: Carmita Devi, a 52 y.o.female, comes to clinic.    New, Established, New Problem:  Established    Location:  Toenails    Duration:   Greater than five years    Onset:  Gradual    Nature:  sore with palpation.    Stable, worsening, improving:   Stable    Aggravating factors:  Pain with shoe gear and ambulation.    Previous Treatment:  debridement  _______________________________________________________________________    Medical changes:  none.    Patient denies any fevers, chills, nausea, vomiting, shortness of breathe, nor any other constitutional signs nor symptoms.       Past Medical History:   Diagnosis Date   • Allergic rhinitis    • Bilateral foot pain    • Chronic frontal sinusitis    • Chronic maxillary sinusitis    • Corns and callus    • Deviated septum    • Difficulty walking    • Facial basal cell cancer    • GERD (gastroesophageal reflux disease)    • HBP (high blood pressure)    • Headache    • Hoarseness    • Hypertension    • Ingrowing toenail    • Mentally challenged    • Myofascial muscle pain    • Osteoporosis    • Otalgia    • Tinea unguium      Past Surgical History:   Procedure Laterality Date   • FOOT SURGERY  NOVEMBER 2022   • SINUS SURGERY     • SKIN CANCER EXCISION       Family History   Problem Relation Age of Onset   • Skin cancer Father    • Cancer Father         basil cell skin cancer     Social History     Socioeconomic History   • Marital status: Single   Tobacco Use   • Smoking status: Never   • Smokeless tobacco: Never   Vaping Use   • Vaping Use: Never used   Substance and Sexual Activity   • Alcohol use: Never   • Drug  use: Never   • Sexual activity: Never     Allergies   Allergen Reactions   • Alendronate Sodium Other (See Comments)     Severe heartburn     • Sulfa Antibiotics Unknown - Low Severity     Current Outpatient Medications   Medication Sig Dispense Refill   • baclofen (LIORESAL) 10 MG tablet baclofen 10 mg oral tablet take 1 tablet (10 mg) by oral route 3 times per day   Active     • Cholecalciferol 50 MCG (2000 UT) capsule Vitamin D3 2,000 unit oral capsule take 1 capsule by oral route daily   Active     • denosumab (Prolia) 60 MG/ML solution prefilled syringe syringe 1 mL.     • estradiol (ESTRACE) 0.1 MG/GM vaginal cream      • estrogens, conjugated, (PREMARIN) 0.625 MG tablet Premarin 0.625 mg oral tablet take 1 tablet (0.625 mg) by oral route once daily   Suspended     • fluticasone (FLONASE) 50 MCG/ACT nasal spray fluticasone 50 mcg/actuation nasal spray,suspension spray 1 spray (50 mcg) in each nostril by intranasal route once daily   Active     • ketoconazole (NIZORAL) 2 % cream Apply 1 application topically to the appropriate area as directed 2 (Two) Times a Day. APPLY TO AFFECTED AREA     • pantoprazole (PROTONIX) 40 MG EC tablet Take 40 mg by mouth 2 (two) times a day.     • Pyrithione Zinc 0.25 % liquid DermaZinc Spray 0.25 % topical spray,non-aerosol apply as directed by topical route once   Active     • triamcinolone (KENALOG) 0.1 % cream      • XYLITOL MT xylitol sinus spray nasal spray as directed   Suspended       No current facility-administered medications for this visit.     Review of Systems   Constitutional: Negative.    Skin:        Painful toenails   All other systems reviewed and are negative.      OBJECTIVE     Vitals:    11/15/22 1438   BP: 154/85   Pulse: 76   Temp: 96.7 °F (35.9 °C)   SpO2: 100%       Patient seen in no apparent distress.      PHYSICAL EXAM:     Foot/Ankle Exam:       General:   Appearance: appears stated age and healthy    Orientation: AAOx3    Affect: appropriate    Gait:  antalgic    Assistance: cane    Shoe Gear:  Casual shoes    VASCULAR      Right Foot Vascularity   Normal vascular exam    Dorsalis pedis:  2+  Posterior tibial:  2+  Skin Temperature: warm    Edema Grading:  None  CFT:  < 3 seconds  Pedal Hair Growth:  Present  Varicosities: none       Left Foot Vascularity   Normal vascular exam    Dorsalis pedis:  2+  Posterior tibial:  2+  Skin Temperature: warm    Edema Grading:  None  CFT:  < 3 seconds  Pedal Hair Growth:  Present  Varicosities: none        NEUROLOGIC     Right Foot Neurologic   Normal sensation    Light touch sensation:  Normal  Vibratory sensation:  Normal  Hot/Cold sensation: normal    Protective Sensation using Maramec-Vanessa Monofilament:  10     Left Foot Neurologic   Normal sensation    Light touch sensation:  Normal  Vibratory sensation:  Normal  Hot/cold sensation: normal    Protective Sensation using Maramec-Vanessa Monofilament:  10     MUSCLE STRENGTH     Right Foot Muscle Strength   Foot dorsiflexion:  4  Foot plantar flexion:  4  Foot inversion:  4  Foot eversion:  4     Left Foot Muscle Strength   Foot dorsiflexion:  4  Foot plantar flexion:  4  Foot inversion:  4  Foot eversion:  4     RANGE OF MOTION      Right Foot Range of Motion   Foot and ankle ROM within normal limits       Left Foot Range of Motion   Foot and ankle ROM within normal limits       DERMATOLOGIC     Right Foot Dermatologic   Skin: skin intact    Nails: onychomycosis, abnormally thick, subungual debris, dystrophic nails and ingrown toenail    Nails comment:  Toenails 1, 2, 3, 4, and 5     Left Foot Dermatologic   Skin: skin intact    Nails: onychomycosis, abnormally thick, subungual debris, dystrophic nails and ingrown toenail    Nails comment:  Toenails 1, 2, 3, 4, and 5      ASSESSMENT/PLAN     Diagnoses and all orders for this visit:    1. Onychocryptosis (Primary)    2. Difficulty walking    3. Foot pain, bilateral    4. Onychomycosis        Comprehensive lower  extremity examination and evaluation was performed.    Discussed findings and treatment plan including risks, benefits, and treatment options with patient in detail. Patient agreed with treatment plan.    Toenails 1 through 5 bilaterally were debrided in thickness and length and then smoothed with a Dremel Tool.  Tolerated the procedure well without complications.    An After Visit Summary was printed and given to the patient at discharge, including (if requested) any available informative/educational handouts regarding diagnosis, treatment, or medications. All questions were answered to patient/family satisfaction. Should symptoms fail to improve or worsen they agree to call or return to clinic or to go to the Emergency Department. Discussed the importance of following up with any needed screening tests/labs/specialist appointments and any requested follow-up recommended by me today. Importance of maintaining follow-up discussed and patient accepts that missed appointments can delay diagnosis and potentially lead to worsening of conditions.    Return in about 9 weeks (around 1/17/2023) for Toenail Care., or sooner if acute issues arise.    This document has been electronically signed by Bertin Fountain DPM on November 15, 2022 15:06 EST

## 2023-01-05 ENCOUNTER — TRANSCRIBE ORDERS (OUTPATIENT)
Dept: ADMINISTRATIVE | Facility: HOSPITAL | Age: 53
End: 2023-01-05
Payer: MEDICARE

## 2023-01-05 DIAGNOSIS — Z12.31 SCREENING MAMMOGRAM, ENCOUNTER FOR: Primary | ICD-10-CM

## 2023-01-24 ENCOUNTER — OFFICE VISIT (OUTPATIENT)
Dept: PODIATRY | Facility: CLINIC | Age: 53
End: 2023-01-24
Payer: MEDICARE

## 2023-01-24 VITALS
SYSTOLIC BLOOD PRESSURE: 147 MMHG | BODY MASS INDEX: 22.72 KG/M2 | TEMPERATURE: 98.1 F | OXYGEN SATURATION: 100 % | HEART RATE: 75 BPM | WEIGHT: 105 LBS | DIASTOLIC BLOOD PRESSURE: 81 MMHG

## 2023-01-24 DIAGNOSIS — M79.671 FOOT PAIN, BILATERAL: ICD-10-CM

## 2023-01-24 DIAGNOSIS — L60.0 ONYCHOCRYPTOSIS: ICD-10-CM

## 2023-01-24 DIAGNOSIS — B35.1 ONYCHOMYCOSIS: Primary | ICD-10-CM

## 2023-01-24 DIAGNOSIS — M79.672 FOOT PAIN, BILATERAL: ICD-10-CM

## 2023-01-24 DIAGNOSIS — R26.2 DIFFICULTY WALKING: ICD-10-CM

## 2023-01-24 PROCEDURE — 11721 DEBRIDE NAIL 6 OR MORE: CPT | Performed by: PODIATRIST

## 2023-01-24 NOTE — PROGRESS NOTES
[facility]    Today's Date: 01/24/23    Patient Name: Carmita Devi  MRN: [id number]  CSN: [id number]  PCP: Joseluis, last PCP visit: 11/30/2022  Referring Provider: No ref. provider found    SUBJECTIVE     Chief Complaint   Patient presents with   • Left Foot - Follow-up, Nail Problem, Pain   • Right Foot - Follow-up, Nail Problem, Pain     HPI: Carmita Devi, a 52 y.o.female, comes to clinic.    New, Established, New Problem:  Established    Location:  Toenails    Duration:   Greater than five years    Onset:  Gradual    Nature:  sore with palpation.    Stable, worsening, improving:   Stable    Aggravating factors:  Pain with shoe gear and ambulation.    Previous Treatment:  debridement  _______________________________________________________________________    Medical changes:  none.    Patient denies any fevers, chills, nausea, vomiting, shortness of breathe, nor any other constitutional signs nor symptoms.       Past Medical History:   Diagnosis Date   • Allergic rhinitis    • Bilateral foot pain    • Chronic frontal sinusitis    • Chronic maxillary sinusitis    • Corns and callus    • Deviated septum    • Difficulty walking    • Facial basal cell cancer    • GERD (gastroesophageal reflux disease)    • HBP (high blood pressure)    • Headache    • Hoarseness    • Hypertension    • Ingrowing toenail    • Mentally challenged    • Myofascial muscle pain    • Osteoporosis    • Otalgia    • Tinea unguium      Past Surgical History:   Procedure Laterality Date   • FOOT SURGERY  NOVEMBER 2022   • SINUS SURGERY     • SKIN CANCER EXCISION       Family History   Problem Relation Age of Onset   • Skin cancer Father    • Cancer Father         basil cell skin cancer     Social History     Socioeconomic History   • Marital status: Single   Tobacco Use   • Smoking status: Never   • Smokeless tobacco: Never   Vaping Use   • Vaping Use: Never used   Substance and Sexual Activity   • Alcohol use:  Never   • Drug use: Never   • Sexual activity: Never     Allergies   Allergen Reactions   • Alendronate Sodium Other (See Comments)     Severe heartburn     • Sulfa Antibiotics Unknown - Low Severity     Current Outpatient Medications   Medication Sig Dispense Refill   • baclofen (LIORESAL) 10 MG tablet baclofen 10 mg oral tablet take 1 tablet (10 mg) by oral route 3 times per day   Active     • Cholecalciferol 50 MCG (2000 UT) capsule Vitamin D3 2,000 unit oral capsule take 1 capsule by oral route daily   Active     • denosumab (Prolia) 60 MG/ML solution prefilled syringe syringe 1 mL.     • estradiol (ESTRACE) 0.1 MG/GM vaginal cream      • estrogens, conjugated, (PREMARIN) 0.625 MG tablet Premarin 0.625 mg oral tablet take 1 tablet (0.625 mg) by oral route once daily   Suspended     • fluticasone (FLONASE) 50 MCG/ACT nasal spray fluticasone 50 mcg/actuation nasal spray,suspension spray 1 spray (50 mcg) in each nostril by intranasal route once daily   Active     • ketoconazole (NIZORAL) 2 % cream Apply 1 application topically to the appropriate area as directed 2 (Two) Times a Day. APPLY TO AFFECTED AREA     • pantoprazole (PROTONIX) 40 MG EC tablet Take 40 mg by mouth 2 (two) times a day.     • Pyrithione Zinc 0.25 % liquid DermaZinc Spray 0.25 % topical spray,non-aerosol apply as directed by topical route once   Active     • triamcinolone (KENALOG) 0.1 % cream      • XYLITOL MT xylitol sinus spray nasal spray as directed   Suspended       No current facility-administered medications for this visit.     Review of Systems   Constitutional: Negative.    Skin:        Painful toenails   All other systems reviewed and are negative.      OBJECTIVE     Vitals:    01/24/23 1350   BP: 147/81   Pulse: 75   Temp: 98.1 °F (36.7 °C)   SpO2: 100%       Patient seen in no apparent distress.      PHYSICAL EXAM:     Foot/Ankle Exam:       General:   Appearance: appears stated age and healthy    Orientation: AAOx3    Affect:  appropriate    Gait: antalgic    Assistance: cane    Shoe Gear:  Casual shoes    VASCULAR      Right Foot Vascularity   Normal vascular exam    Dorsalis pedis:  2+  Posterior tibial:  2+  Skin Temperature: warm    Edema Grading:  None  CFT:  < 3 seconds  Pedal Hair Growth:  Present  Varicosities: none       Left Foot Vascularity   Normal vascular exam    Dorsalis pedis:  2+  Posterior tibial:  2+  Skin Temperature: warm    Edema Grading:  None  CFT:  < 3 seconds  Pedal Hair Growth:  Present  Varicosities: none        NEUROLOGIC     Right Foot Neurologic   Normal sensation    Light touch sensation:  Normal  Vibratory sensation:  Normal  Hot/Cold sensation: normal    Protective Sensation using [place] Monofilament:  10     Left Foot Neurologic   Normal sensation    Light touch sensation:  Normal  Vibratory sensation:  Normal  Hot/cold sensation: normal    Protective Sensation using [place] Monofilament:  10     MUSCLE STRENGTH     Right Foot Muscle Strength   Foot dorsiflexion:  4  Foot plantar flexion:  4  Foot inversion:  4  Foot eversion:  4     Left Foot Muscle Strength   Foot dorsiflexion:  4  Foot plantar flexion:  4  Foot inversion:  4  Foot eversion:  4     RANGE OF MOTION      Right Foot Range of Motion   Foot and ankle ROM within normal limits       Left Foot Range of Motion   Foot and ankle ROM within normal limits       DERMATOLOGIC     Right Foot Dermatologic   Skin: skin intact    Nails: onychomycosis, abnormally thick, subungual debris, dystrophic nails and ingrown toenail    Nails comment:  Toenails 1, 2, 3, 4, and 5     Left Foot Dermatologic   Skin: skin intact    Nails: onychomycosis, abnormally thick, subungual debris, dystrophic nails and ingrown toenail    Nails comment:  Toenails 1, 2, 3, 4, and 5      ASSESSMENT/PLAN     Diagnoses and all orders for this visit:    1. Onychomycosis (Primary)    2. Onychocryptosis    3. Difficulty walking    4. Foot pain,  bilateral        Comprehensive lower extremity examination and evaluation was performed.    Discussed findings and treatment plan including risks, benefits, and treatment options with patient in detail. Patient agreed with treatment plan.    Toenails 1 through 5 bilaterally were debrided in thickness and length and then smoothed with a Dremel Tool.  Tolerated the procedure well without complications.    An After Visit Summary was printed and given to the patient at discharge, including (if requested) any available informative/educational handouts regarding diagnosis, treatment, or medications. All questions were answered to patient/family satisfaction. Should symptoms fail to improve or worsen they agree to call or return to clinic or to go to the Emergency Department. Discussed the importance of following up with any needed screening tests/labs/specialist appointments and any requested follow-up recommended by me today. Importance of maintaining follow-up discussed and patient accepts that missed appointments can delay diagnosis and potentially lead to worsening of conditions.    Return in about 9 weeks (around 3/28/2023) for Toenail Care., or sooner if acute issues arise.    This document has been electronically signed by Bertin Fountain DPM on January 24, 2023 14:23 EST

## 2023-02-13 ENCOUNTER — OFFICE VISIT (OUTPATIENT)
Dept: OTOLARYNGOLOGY | Facility: CLINIC | Age: 53
End: 2023-02-13
Payer: MEDICARE

## 2023-02-13 VITALS
BODY MASS INDEX: 22.7 KG/M2 | TEMPERATURE: 97.7 F | SYSTOLIC BLOOD PRESSURE: 158 MMHG | HEIGHT: 57 IN | DIASTOLIC BLOOD PRESSURE: 84 MMHG | WEIGHT: 105.2 LBS

## 2023-02-13 DIAGNOSIS — J31.0 CHRONIC RHINOSINUSITIS: Primary | ICD-10-CM

## 2023-02-13 DIAGNOSIS — M79.18 MYOFASCIAL MUSCLE PAIN: ICD-10-CM

## 2023-02-13 DIAGNOSIS — J32.9 CHRONIC RHINOSINUSITIS: Primary | ICD-10-CM

## 2023-02-13 DIAGNOSIS — R51.9 HEADACHE DISORDER: ICD-10-CM

## 2023-02-13 PROCEDURE — 99204 OFFICE O/P NEW MOD 45 MIN: CPT | Performed by: OTOLARYNGOLOGY

## 2023-02-13 RX ORDER — PREDNISONE 20 MG/1
40 TABLET ORAL DAILY
Qty: 10 TABLET | Refills: 0 | Status: SHIPPED | OUTPATIENT
Start: 2023-02-13 | End: 2023-02-18

## 2023-02-13 RX ORDER — AMOXICILLIN AND CLAVULANATE POTASSIUM 875; 125 MG/1; MG/1
1 TABLET, FILM COATED ORAL EVERY 12 HOURS
Qty: 42 TABLET | Refills: 0 | Status: SHIPPED | OUTPATIENT
Start: 2023-02-13

## 2023-02-13 NOTE — PROGRESS NOTES
"Patient Name: Carmita Devi   Visit Date: 02/13/2023   Patient ID: [id number]  Provider: Curt Hays MD    Sex: female  Location: [facility]   YOB: 1970  Location Address: [address] [place] [address], [address]   [place],?[place]?[id number]    Primary Care Provider Josue Huggins MD  Location Phone: [phone]    Referring Provider: Josue Huggins MD        Chief Complaint  Sinus Problem and sinus congestion (New patient due to time frame (pt was seen in 2017 for allergic rhinitis, headache and facial pain) )    History of Present Illness  Camrita Devi is a 52 y.o. female with past medical history is significant for cerebral palsy, hypertension, GERD, depression, psoriasis, and vitamin D deficiency who returns for follow-up of chronic rhinitis, chronic rhinosinusitis, and headache. She was originally seen on 2/27/17. Please see that note for further details. She has had issues with chronic rhinosinusitis in the past requiring 2 separate sinus surgeries. The first of which was done in the 1980s and the second was performed approximately 5 years ago in [place]. She recently moved from [place] to [place] in June of last year. Since that time, she reports more frequent issues with headaches which she describes as severe pressure affecting her for head and her occiput more severely. These are typically associated with some nausea but she denies any photophobia, aura, or phonophobia. She does report some increasing congestion, clear rhinorrhea, occasional foul smell in her nose, and diminished sense of smell. She had undergone previous allergy testing and was diagnosed with \"hayfever\" and allergies to cat dander. She has tried Astepro, Flonase, and homeopathic nasal sprays.  She is also tried on a course of ibuprofen and baclofen without improvement.  CT scan of her face without contrast on 9/5/2017 revealed postoperative changes consistent with bilateral maxillary " antrostomies and total ethmoidectomies.  The sinuses were clear aside from a mucous retention cyst in the right maxillary sinus floor.  At that time, we had discussed a trial of migraine prophylaxis versus other options for treating myofascial muscle pain.    She returns today for repeat evaluation.  She tells me that she continues to experience nearly daily frontal and occipital pain/pressure which occasionally causes her to vomit.  She reports clear to white rhinorrhea and blows her nose frequently.  She denies any nasal congestion, hyposmia, or cough.  She mentions occasional epistaxis.  She is currently using fluticasone and a steamer.  She denies any photophobia or vision changes.  She does report occasional neck pain and stiffness.      R sept  Myofascial   Wouldn't tolerate scope      Past Medical History:   Diagnosis Date   • Allergic rhinitis    • Bilateral foot pain    • Chronic frontal sinusitis    • Chronic maxillary sinusitis    • Corns and callus    • Deviated septum    • Difficulty walking    • Facial basal cell cancer    • GERD (gastroesophageal reflux disease)    • HBP (high blood pressure)    • Headache    • Hoarseness    • Hypertension    • Ingrowing toenail    • Mentally challenged    • Myofascial muscle pain    • Osteoporosis    • Otalgia    • Tinea unguium        Past Surgical History:   Procedure Laterality Date   • FOOT SURGERY  NOVEMBER 2022   • SINUS SURGERY     • SKIN CANCER EXCISION           Current Outpatient Medications:   •  baclofen (LIORESAL) 10 MG tablet, baclofen 10 mg oral tablet take 1 tablet (10 mg) by oral route 3 times per day   Active, Disp: , Rfl:   •  Cholecalciferol 50 MCG (2000 UT) capsule, Vitamin D3 2,000 unit oral capsule take 1 capsule by oral route daily   Active, Disp: , Rfl:   •  denosumab (Prolia) 60 MG/ML solution prefilled syringe syringe, 1 mL., Disp: , Rfl:   •  estradiol (ESTRACE) 0.1 MG/GM vaginal cream, , Disp: , Rfl:   •  estrogens, conjugated,  "(PREMARIN) 0.625 MG tablet, Premarin 0.625 mg oral tablet take 1 tablet (0.625 mg) by oral route once daily   Suspended, Disp: , Rfl:   •  fluticasone (FLONASE) 50 MCG/ACT nasal spray, fluticasone 50 mcg/actuation nasal spray,suspension spray 1 spray (50 mcg) in each nostril by intranasal route once daily   Active, Disp: , Rfl:   •  pantoprazole (PROTONIX) 40 MG EC tablet, Take 40 mg by mouth 2 (two) times a day., Disp: , Rfl:   •  Pyrithione Zinc 0.25 % liquid, DermaZinc Spray 0.25 % topical spray,non-aerosol apply as directed by topical route once   Active, Disp: , Rfl:   •  XYLITOL MT, xylitol sinus spray nasal spray as directed   Suspended, Disp: , Rfl:   •  amoxicillin-clavulanate (AUGMENTIN) 875-125 MG per tablet, Take 1 tablet by mouth Every 12 (Twelve) Hours., Disp: 42 tablet, Rfl: 0  •  ketoconazole (NIZORAL) 2 % cream, Apply 1 application topically to the appropriate area as directed 2 (Two) Times a Day. APPLY TO AFFECTED AREA, Disp: , Rfl:   •  predniSONE (DELTASONE) 20 MG tablet, Take 2 tablets by mouth Daily for 5 days., Disp: 10 tablet, Rfl: 0  •  triamcinolone (KENALOG) 0.1 % cream, , Disp: , Rfl:      Allergies   Allergen Reactions   • Alendronate Sodium Other (See Comments)     Severe heartburn     • Sulfa Antibiotics Rash     Legs and face swelling        Social History     Tobacco Use   • Smoking status: Never   • Smokeless tobacco: Never   Vaping Use   • Vaping Use: Never used   Substance Use Topics   • Alcohol use: Never   • Drug use: Never        Objective     Vital Signs:   /84 (BP Location: Right arm, Patient Position: Sitting)   Temp 97.7 °F (36.5 °C)   Ht 144.8 cm (57\")   Wt 47.7 kg (105 lb 3.2 oz)   BMI 22.77 kg/m²       Physical Exam    General: Well developed, well nourished patient of stated age in no acute distress. Voice is strong and clear.   Head: Normocephalic and atraumatic.  Face: No lesions.  Bilateral parotid and submandibular glands are unremarkable.  Stensen's and " Warthin's ducts are productive of clear saliva bilaterally.  House-Brackmann I/VI     bilaterally.   muscles and temporomandibular joint nontender to palpation.  No TMJ crepitus.  Eyes: PERRLA, sclerae anicteric, no conjunctival injection. Extraocular movements are intact and full. No nystagmus.   Ears: Auricles are normal in appearance. Bilateral external auditory canals are unremarkable. Bilateral tympanic membranes are clear and without effusion. Hearing normal to conversational voice.   Nose: External nose is normal in appearance. Bilateral nares are patent with mildly erythematous appearing mucosa. Septum slightly deviated to the right. Turbinates are unremarkable. No lesions.   Oral Cavity: Lips are normal in appearance. Oral mucosa is unremarkable. Gingiva is unremarkable. Normal dentition for age. Tongue is unremarkable with good movement. Hard palate is unremarkable.   Oropharynx: Soft palate is unremarkable with full movement. Uvula is unremarkable. Bilateral tonsils are unremarkable. Posterior oropharynx is unremarkable.    Larynx and hypopharynx: Deferred secondary to gag reflex.  Neck: Supple.  No mass.  Nontender to palpation.  Trachea midline. Thyroid normal size and without nodules to palpation.   Lymphatic: No lymphadenopathy upon palpation.  Respiratory: Clear to auscultation bilaterally, nonlabored respirations    Cardiovascular: RRR, no murmurs, rubs, or gallops,   Psychiatric: Appropriate affect, cooperative   Neurologic: Oriented x 3, strength symmetric in all extremities, Cranial Nerves II-XII are grossly intact to confrontation   Skin: Warm and dry. No rashes.    Procedures     Diagnostic nasal endoscopy: After anesthetizing and decongesting her bilateral nasal cavities with lidocaine and Afrin sprays respectively she was unable to tolerate flexible endoscopy.      Result Review :               Assessment and Plan    Diagnoses and all orders for this visit:    1. Chronic  rhinosinusitis (Primary)  -     amoxicillin-clavulanate (AUGMENTIN) 875-125 MG per tablet; Take 1 tablet by mouth Every 12 (Twelve) Hours.  Dispense: 42 tablet; Refill: 0  -     predniSONE (DELTASONE) 20 MG tablet; Take 2 tablets by mouth Daily for 5 days.  Dispense: 10 tablet; Refill: 0    2. Myofascial muscle pain    3. Headache disorder      Impressions and findings were discussed at great length.  Currently, she returns today for evaluation of persistent frontal and occipital headaches occasionally associated with vomiting as well as clear to white rhinorrhea.  Unfortunately, she was unable to tolerate nasal endoscopy today.  We discussed the differential as well as options for further evaluation and management including a trial of migraine prophylaxis versus treatment of myofascial muscle pain versus treatment of chronic rhinosinusitis.  After thorough discussion we will pursue treatment with an extended course of Augmentin and prednisone.  She will follow-up after but if doing no better we will pursue further treatment of her myofascial muscle pain and potential migraine headache.      Follow Up   Return in about 4 weeks (around 3/13/2023).  Patient was given instructions and counseling regarding her condition or for health maintenance advice. Please see specific information pulled into the AVS if appropriate.

## 2023-02-20 DIAGNOSIS — J31.0 CHRONIC RHINOSINUSITIS: Primary | ICD-10-CM

## 2023-02-20 DIAGNOSIS — J32.9 CHRONIC RHINOSINUSITIS: Primary | ICD-10-CM

## 2023-02-20 DIAGNOSIS — J32.9 CHRONIC RHINOSINUSITIS: ICD-10-CM

## 2023-02-20 DIAGNOSIS — J31.0 CHRONIC RHINOSINUSITIS: ICD-10-CM

## 2023-02-20 RX ORDER — DOXYCYCLINE HYCLATE 100 MG/1
100 CAPSULE ORAL 2 TIMES DAILY
Qty: 28 CAPSULE | Refills: 0 | Status: SHIPPED | OUTPATIENT
Start: 2023-02-20 | End: 2023-03-06

## 2023-02-20 RX ORDER — DOXYCYCLINE HYCLATE 100 MG/1
100 CAPSULE ORAL 2 TIMES DAILY
Qty: 28 CAPSULE | Refills: 0 | Status: SHIPPED | OUTPATIENT
Start: 2023-02-20 | End: 2023-02-20 | Stop reason: SDUPTHER

## 2023-03-20 ENCOUNTER — OFFICE VISIT (OUTPATIENT)
Dept: OTOLARYNGOLOGY | Facility: CLINIC | Age: 53
End: 2023-03-20
Payer: MEDICARE

## 2023-03-20 VITALS — HEART RATE: 85 BPM | TEMPERATURE: 97.2 F | DIASTOLIC BLOOD PRESSURE: 97 MMHG | SYSTOLIC BLOOD PRESSURE: 151 MMHG

## 2023-03-20 DIAGNOSIS — J31.0 CHRONIC RHINOSINUSITIS: ICD-10-CM

## 2023-03-20 DIAGNOSIS — J32.9 CHRONIC RHINOSINUSITIS: ICD-10-CM

## 2023-03-20 DIAGNOSIS — M79.18 MYOFASCIAL MUSCLE PAIN: Primary | ICD-10-CM

## 2023-03-20 DIAGNOSIS — R51.9 HEADACHE DISORDER: ICD-10-CM

## 2023-03-20 PROCEDURE — 1159F MED LIST DOCD IN RCRD: CPT | Performed by: OTOLARYNGOLOGY

## 2023-03-20 PROCEDURE — 3077F SYST BP >= 140 MM HG: CPT | Performed by: OTOLARYNGOLOGY

## 2023-03-20 PROCEDURE — 1160F RVW MEDS BY RX/DR IN RCRD: CPT | Performed by: OTOLARYNGOLOGY

## 2023-03-20 PROCEDURE — 99213 OFFICE O/P EST LOW 20 MIN: CPT | Performed by: OTOLARYNGOLOGY

## 2023-03-20 PROCEDURE — 3080F DIAST BP >= 90 MM HG: CPT | Performed by: OTOLARYNGOLOGY

## 2023-03-20 NOTE — PROGRESS NOTES
"Patient Name: Carmita Devi   Visit Date: 03/20/2023   Patient ID: [id number]  Provider: Curt Hays MD    Sex: female  Location: [facility]   YOB: 1970  Location Address: [address] [place] [address], [address]   [place],?[place]?[address]    Primary Care Provider Josue Huggins MD  Location Phone: [phone]    Referring Provider: No ref. provider found        Chief Complaint  4 week follow up    History of Present Illness  Carmita Devi is a 53 y.o. female with past medical history is significant for cerebral palsy, hypertension, GERD, depression, psoriasis, and vitamin D deficiency who returns for follow-up of chronic rhinitis, chronic rhinosinusitis, and headache. She was originally seen on 2/27/17. Please see that note for further details. She has had issues with chronic rhinosinusitis in the past requiring 2 separate sinus surgeries. The first of which was done in the 1980s and the second was performed approximately 5 years ago in [place]. She recently moved from [place] to [place] in June of last year. Since that time, she reports more frequent issues with headaches which she describes as severe pressure affecting her for head and her occiput more severely. These are typically associated with some nausea but she denies any photophobia, aura, or phonophobia. She does report some increasing congestion, clear rhinorrhea, occasional foul smell in her nose, and diminished sense of smell. She had undergone previous allergy testing and was diagnosed with \"hayfever\" and allergies to cat dander. She has tried Astepro, Flonase, and homeopathic nasal sprays.  She is also tried on a course of ibuprofen and baclofen without improvement.  CT scan of her face without contrast on 9/5/2017 revealed postoperative changes consistent with bilateral maxillary antrostomies and total ethmoidectomies.  The sinuses were clear aside from a mucous retention cyst in the right maxillary " sinus floor.  At that time, we had discussed a trial of migraine prophylaxis versus other options for treating myofascial muscle pain.    She returns today for follow-up having last been seen on 2/13/2023 at which time she reported almost daily frontal and occipital pain/pressure which would occasionally lead to vomiting.  She also was experiencing white-colored rhinorrhea and occasional epistaxis.  She mentioned neck pain and stiffness.  She was unable to tolerate nasal endoscopy and we discussed trial of migraine prophylaxis versus treatment of myofascial muscle pain versus treatment of chronic rhinosinusitis.  She elected to pursue rhinosinusitis treatment with Augmentin and prednisone. Her augmentin had to be switched to doxycycline as her stomach did not tolerate it. She tells me the antibiotics did not help her symptoms. She feels like she is experience a significant amount of nasal drainage and continues to experience headache. She continues to experience sneezing attacks. She is using fluticasone, sudafed, alkaseltzer, Excedrin migraine, nasal steamer, and xclear.     Past Medical History:   Diagnosis Date   • Allergic rhinitis    • Bilateral foot pain    • Chronic frontal sinusitis    • Chronic maxillary sinusitis    • Corns and callus    • Deviated septum    • Difficulty walking    • Facial basal cell cancer    • GERD (gastroesophageal reflux disease)    • HBP (high blood pressure)    • Headache    • Hoarseness    • Hypertension    • Ingrowing toenail    • Mentally challenged    • Myofascial muscle pain    • Osteoporosis    • Otalgia    • Tinea unguium        Past Surgical History:   Procedure Laterality Date   • FOOT SURGERY  NOVEMBER 2022   • SINUS SURGERY     • SKIN CANCER EXCISION           Current Outpatient Medications:   •  baclofen (LIORESAL) 10 MG tablet, baclofen 10 mg oral tablet take 1 tablet (10 mg) by oral route 3 times per day   Active, Disp: , Rfl:   •  Cholecalciferol 50 MCG (2000 UT)  capsule, Vitamin D3 2,000 unit oral capsule take 1 capsule by oral route daily   Active, Disp: , Rfl:   •  denosumab (Prolia) 60 MG/ML solution prefilled syringe syringe, 1 mL., Disp: , Rfl:   •  estradiol (ESTRACE) 0.1 MG/GM vaginal cream, , Disp: , Rfl:   •  estrogens, conjugated, (PREMARIN) 0.625 MG tablet, Premarin 0.625 mg oral tablet take 1 tablet (0.625 mg) by oral route once daily   Suspended, Disp: , Rfl:   •  fluticasone (FLONASE) 50 MCG/ACT nasal spray, fluticasone 50 mcg/actuation nasal spray,suspension spray 1 spray (50 mcg) in each nostril by intranasal route once daily   Active, Disp: , Rfl:   •  ketoconazole (NIZORAL) 2 % cream, Apply 1 application topically to the appropriate area as directed 2 (Two) Times a Day. APPLY TO AFFECTED AREA, Disp: , Rfl:   •  pantoprazole (PROTONIX) 40 MG EC tablet, Take 1 tablet by mouth 2 (two) times a day., Disp: , Rfl:   •  Pyrithione Zinc 0.25 % liquid, DermaZinc Spray 0.25 % topical spray,non-aerosol apply as directed by topical route once   Active, Disp: , Rfl:   •  triamcinolone (KENALOG) 0.1 % cream, , Disp: , Rfl:   •  XYLITOL MT, xylitol sinus spray nasal spray as directed   Suspended, Disp: , Rfl:   •  amoxicillin-clavulanate (AUGMENTIN) 875-125 MG per tablet, Take 1 tablet by mouth Every 12 (Twelve) Hours., Disp: 42 tablet, Rfl: 0     Allergies   Allergen Reactions   • Alendronate Sodium Other (See Comments)     Severe heartburn     • Sulfa Antibiotics Rash     Legs and face swelling        Social History     Tobacco Use   • Smoking status: Never     Passive exposure: Never   • Smokeless tobacco: Never   Vaping Use   • Vaping Use: Never used   Substance Use Topics   • Alcohol use: Never   • Drug use: Never        Objective     Vital Signs:   /97   Pulse 85   Temp 97.2 °F (36.2 °C)       Physical Exam    General: Well developed, well nourished patient of stated age in no acute distress. Voice is strong and clear.   Head: Normocephalic and  atraumatic.  Face: No lesions.  Bilateral parotid and submandibular glands are unremarkable.  Stensen's and Warthin's ducts are productive of clear saliva bilaterally.  House-Brackmann I/VI     bilaterally.   muscles are mildly tender to palpation.  No TMJ crepitus.  Eyes: PERRLA, sclerae anicteric, no conjunctival injection. Extraocular movements are intact and full. No nystagmus.   Ears: Auricles are normal in appearance. Bilateral external auditory canals are unremarkable. Bilateral tympanic membranes are clear and without effusion. Hearing normal to conversational voice.   Nose: External nose is normal in appearance. Bilateral nares are patent with mildly erythematous appearing mucosa. Septum slightly deviated to the right. Turbinates are unremarkable. No lesions.   Oral Cavity: Lips are normal in appearance. Oral mucosa is unremarkable. Gingiva is unremarkable. Normal dentition for age. Tongue is unremarkable with good movement. Hard palate is unremarkable.   Oropharynx: Soft palate is unremarkable with full movement. Uvula is unremarkable. Bilateral tonsils are unremarkable. Posterior oropharynx is unremarkable.    Larynx and hypopharynx: Deferred secondary to gag reflex.  Neck: Supple.  No mass.  Paraspinal muscles are tender to palpation.  Trachea midline. Thyroid normal size and without nodules to palpation.   Lymphatic: No lymphadenopathy upon palpation.  Respiratory: Clear to auscultation bilaterally, nonlabored respirations    Cardiovascular: RRR, no murmurs, rubs, or gallops,   Psychiatric: Appropriate affect, cooperative   Neurologic: Oriented x 3, strength symmetric in all extremities, Cranial Nerves II-XII are grossly intact to confrontation   Skin: Warm and dry. No rashes.    Procedures       Result Review :               Assessment and Plan    Diagnoses and all orders for this visit:    1. Myofascial muscle pain (Primary)  -     Ambulatory Referral to Physical Therapy Evaluate and treat  (neck and face pain)    2. Headache disorder  -     Ambulatory Referral to Physical Therapy Evaluate and treat (neck and face pain)    3. Chronic rhinosinusitis      Impressions and findings were discussed at great length.  Unfortunately, antibiotics did not seem to improve her symptoms.  She continues to experience frontal and occipital pain along with neck pain and a sensation of postnasal mucus drainage.  We again discussed the differential as well as further options for evaluation and management such as a trial of physical therapy secondary my concerns for myofascial muscle pain/TMJ versus further work-up of her sinuses with a CT scan versus further medical management.  After a thorough discussion she would like to try physical therapy and will be referred.  If she does not feel like this is helpful she will call to arrange a CT scan of her sinuses for further work-up.    Follow Up   No follow-ups on file.  Patient was given instructions and counseling regarding her condition or for health maintenance advice. Please see specific information pulled into the AVS if appropriate.

## 2023-03-22 ENCOUNTER — OFFICE VISIT (OUTPATIENT)
Dept: PODIATRY | Facility: CLINIC | Age: 53
End: 2023-03-22
Payer: MEDICARE

## 2023-03-22 VITALS
BODY MASS INDEX: 22.44 KG/M2 | HEART RATE: 71 BPM | SYSTOLIC BLOOD PRESSURE: 172 MMHG | WEIGHT: 104 LBS | HEIGHT: 57 IN | OXYGEN SATURATION: 99 % | DIASTOLIC BLOOD PRESSURE: 89 MMHG | TEMPERATURE: 97.8 F

## 2023-03-22 DIAGNOSIS — M79.671 FOOT PAIN, RIGHT: Primary | ICD-10-CM

## 2023-03-22 DIAGNOSIS — R26.2 DIFFICULTY WALKING: ICD-10-CM

## 2023-03-22 DIAGNOSIS — L60.0 ONYCHOCRYPTOSIS: ICD-10-CM

## 2023-03-22 PROCEDURE — 3079F DIAST BP 80-89 MM HG: CPT | Performed by: PODIATRIST

## 2023-03-22 PROCEDURE — 1160F RVW MEDS BY RX/DR IN RCRD: CPT | Performed by: PODIATRIST

## 2023-03-22 PROCEDURE — 3077F SYST BP >= 140 MM HG: CPT | Performed by: PODIATRIST

## 2023-03-22 PROCEDURE — 1159F MED LIST DOCD IN RCRD: CPT | Performed by: PODIATRIST

## 2023-03-22 PROCEDURE — 99213 OFFICE O/P EST LOW 20 MIN: CPT | Performed by: PODIATRIST

## 2023-03-22 NOTE — PROGRESS NOTES
[facility] - PODIATRY    Today's Date: 03/22/23    Patient Name: Carmita Devi  MRN: [id number]  CSN: [id number]  PCP: Josue Huggins MD  Referring Provider: No ref. provider found    SUBJECTIVE     Chief Complaint   Patient presents with   • Right Foot - Ingrown Toenail     HPI: Carmita Devi, a 53 y.o.female, comes to clinic.    New, Established, New Problem:  New problem  Location:  Border of right first toe  Duration:   Over the past week  Onset:  Gradual  Nature:  sore with palpation.  Stable, worsening, improving:   Worsening  Aggravating factors:  Pain with shoe gear and ambulation.  Previous Treatment:  None    No other pedal complaints at this time.    Patient denies any fevers, chills, nausea, vomiting, shortness of breath, nor any other constitutional signs nor symptoms.       Past Medical History:   Diagnosis Date   • Allergic rhinitis    • Bilateral foot pain    • Chronic frontal sinusitis    • Chronic maxillary sinusitis    • Corns and callus    • Deviated septum    • Difficulty walking    • Facial basal cell cancer    • GERD (gastroesophageal reflux disease)    • HBP (high blood pressure)    • Headache    • Hoarseness    • Hypertension    • Ingrowing toenail    • Mentally challenged    • Myofascial muscle pain    • Osteoporosis    • Otalgia    • Tinea unguium      Past Surgical History:   Procedure Laterality Date   • FOOT SURGERY  NOVEMBER 2022   • SINUS SURGERY     • SKIN CANCER EXCISION       Family History   Problem Relation Age of Onset   • Skin cancer Father    • Cancer Father         basil cell skin cancer     Social History     Socioeconomic History   • Marital status: Single   Tobacco Use   • Smoking status: Never     Passive exposure: Never   • Smokeless tobacco: Never   Vaping Use   • Vaping Use: Never used   Substance and Sexual Activity   • Alcohol use: Never   • Drug use: Never   • Sexual activity: Never     Allergies   Allergen Reactions   • Alendronate  Sodium Other (See Comments)     Severe heartburn     • Sulfa Antibiotics Rash     Legs and face swelling      Current Outpatient Medications   Medication Sig Dispense Refill   • amoxicillin-clavulanate (AUGMENTIN) 875-125 MG per tablet Take 1 tablet by mouth Every 12 (Twelve) Hours. 42 tablet 0   • baclofen (LIORESAL) 10 MG tablet baclofen 10 mg oral tablet take 1 tablet (10 mg) by oral route 3 times per day   Active     • Cholecalciferol 50 MCG (2000 UT) capsule Vitamin D3 2,000 unit oral capsule take 1 capsule by oral route daily   Active     • denosumab (Prolia) 60 MG/ML solution prefilled syringe syringe 1 mL.     • estradiol (ESTRACE) 0.1 MG/GM vaginal cream      • estrogens, conjugated, (PREMARIN) 0.625 MG tablet Premarin 0.625 mg oral tablet take 1 tablet (0.625 mg) by oral route once daily   Suspended     • fluticasone (FLONASE) 50 MCG/ACT nasal spray fluticasone 50 mcg/actuation nasal spray,suspension spray 1 spray (50 mcg) in each nostril by intranasal route once daily   Active     • ketoconazole (NIZORAL) 2 % cream Apply 1 application topically to the appropriate area as directed 2 (Two) Times a Day. APPLY TO AFFECTED AREA     • pantoprazole (PROTONIX) 40 MG EC tablet Take 1 tablet by mouth 2 (two) times a day.     • Pyrithione Zinc 0.25 % liquid DermaZinc Spray 0.25 % topical spray,non-aerosol apply as directed by topical route once   Active     • triamcinolone (KENALOG) 0.1 % cream      • XYLITOL MT xylitol sinus spray nasal spray as directed   Suspended       No current facility-administered medications for this visit.     Review of Systems   Constitutional: Negative.    Skin:        Painful Right in-grown toenail   All other systems reviewed and are negative.      OBJECTIVE     Vitals:    03/22/23 1255   BP: 172/89   Pulse: 71   Temp: 97.8 °F (36.6 °C)   SpO2: 99%       PHYSICAL EXAM     Foot/Ankle Exam:       General:   Appearance: appears stated age and healthy    Orientation: AAOx3    Affect:  appropriate    Gait: antalgic    Assistance: cane    Shoe Gear:  Casual shoes    VASCULAR      Right Foot Vascularity   Normal vascular exam    Dorsalis pedis:  2+  Posterior tibial:  2+  Skin Temperature: warm    Edema Grading:  None  CFT:  < 3 seconds  Pedal Hair Growth:  Present  Varicosities: none       Left Foot Vascularity   Normal vascular exam    Dorsalis pedis:  2+  Posterior tibial:  2+  Skin Temperature: warm    Edema Grading:  None  CFT:  < 3 seconds  Pedal Hair Growth:  Present  Varicosities: none        NEUROLOGIC     Right Foot Neurologic   Normal sensation    Light touch sensation:  Normal  Vibratory sensation:  Normal  Hot/Cold sensation: normal       Left Foot Neurologic   Normal sensation    Light touch sensation:  Normal  Vibratory sensation:  Normal  Hot/cold sensation: normal       MUSCLE STRENGTH     Right Foot Muscle Strength   Foot dorsiflexion:  4-  Foot plantar flexion:  4-  Foot inversion:  4-  Foot eversion:  4-     Left Foot Muscle Strength   Foot dorsiflexion:  4-  Foot plantar flexion:  4-  Foot inversion:  4-  Foot eversion:  4-     RANGE OF MOTION      Right Foot Range of Motion   Foot and ankle ROM within normal limits       Left Foot Range of Motion   Foot and ankle ROM within normal limits       DERMATOLOGIC     Right Foot Dermatologic   Skin: skin intact    Nails: ingrown toenail    Nails comment:  Right 1st lateral nail border     Left Foot Dermatologic   Skin: skin intact    Nails: normal        ASSESSMENT/PLAN     Diagnoses and all orders for this visit:    1. Foot pain, right (Primary)    2. Onychocryptosis    3. Difficulty walking      Comprehensive lower extremity examination and evaluation was performed.    Discussed findings and treatment plan including risks, benefits, and treatment options with patient in detail. Patient agreed with treatment plan.    Slant back debridement - This procedure is indicated for onychocryptosis. Indications, risks and benefits and  alternative treatments have been discussed with this patient who has agreed to this procedure. The area was prepped with isopropyl alcohol solution.  The offending nailborder was excised via slant back debridement with tissue nippers.  A sterile dressing was applied. The patient tolerated the procedure well.    An After Visit Summary was printed and given to the patient at discharge, including (if requested) any available informative/educational handouts regarding diagnosis, treatment, or medications. All questions were answered to patient/family satisfaction. Should symptoms fail to improve or worsen they agree to call or return to clinic or to go to the Emergency Department. Discussed the importance of following up with any needed screening tests/labs/specialist appointments and any requested follow-up recommended by me today. Importance of maintaining follow-up discussed and patient accepts that missed appointments can delay diagnosis and potentially lead to worsening of conditions.    Return in about 5 days (around 3/27/2023) for Toenail Care, Already made., or sooner if acute issues arise.    This document has been electronically signed by Bertin Fountain DPM on March 22, 2023 13:16 EDT

## 2023-03-27 ENCOUNTER — OFFICE VISIT (OUTPATIENT)
Dept: PODIATRY | Facility: CLINIC | Age: 53
End: 2023-03-27
Payer: MEDICARE

## 2023-03-27 VITALS
OXYGEN SATURATION: 100 % | TEMPERATURE: 98.4 F | HEART RATE: 92 BPM | WEIGHT: 104 LBS | BODY MASS INDEX: 22.51 KG/M2 | DIASTOLIC BLOOD PRESSURE: 88 MMHG | SYSTOLIC BLOOD PRESSURE: 136 MMHG

## 2023-03-27 DIAGNOSIS — B35.1 ONYCHOMYCOSIS: ICD-10-CM

## 2023-03-27 DIAGNOSIS — M79.671 FOOT PAIN, BILATERAL: Primary | ICD-10-CM

## 2023-03-27 DIAGNOSIS — M79.672 FOOT PAIN, BILATERAL: Primary | ICD-10-CM

## 2023-03-27 DIAGNOSIS — L60.0 ONYCHOCRYPTOSIS: ICD-10-CM

## 2023-03-27 DIAGNOSIS — R26.2 DIFFICULTY WALKING: ICD-10-CM

## 2023-03-27 PROCEDURE — 1159F MED LIST DOCD IN RCRD: CPT | Performed by: PODIATRIST

## 2023-03-27 PROCEDURE — 3079F DIAST BP 80-89 MM HG: CPT | Performed by: PODIATRIST

## 2023-03-27 PROCEDURE — 1160F RVW MEDS BY RX/DR IN RCRD: CPT | Performed by: PODIATRIST

## 2023-03-27 PROCEDURE — 11721 DEBRIDE NAIL 6 OR MORE: CPT | Performed by: PODIATRIST

## 2023-03-27 PROCEDURE — 3075F SYST BP GE 130 - 139MM HG: CPT | Performed by: PODIATRIST

## 2023-03-27 NOTE — PROGRESS NOTES
[facility] - PODIATRY    Today's Date: 03/27/23    Patient Name: Carmita Devi  MRN: [id number]  CSN: [id number]  PCP: Joseluis, last PCP visit: 11/30/2022  Referring Provider: No ref. provider found    SUBJECTIVE     Chief Complaint   Patient presents with   • Left Foot - Follow-up, Nail Problem   • Right Foot - Follow-up, Nail Problem     HPI: Carmita Devi, a 53 y.o.female, comes to clinic.    New, Established, New Problem:  Established    Location:  Toenails    Duration:   Greater than five years    Onset:  Gradual    Nature:  sore with palpation.    Stable, worsening, improving:   Stable    Aggravating factors:  Pain with shoe gear and ambulation.    Previous Treatment:  debridement  _______________________________________________________________________    Medical changes:  none.    Patient denies any fevers, chills, nausea, vomiting, shortness of breathe, nor any other constitutional signs nor symptoms.       Past Medical History:   Diagnosis Date   • Allergic rhinitis    • Bilateral foot pain    • Chronic frontal sinusitis    • Chronic maxillary sinusitis    • Corns and callus    • Deviated septum    • Difficulty walking    • Facial basal cell cancer    • GERD (gastroesophageal reflux disease)    • HBP (high blood pressure)    • Headache    • Hoarseness    • Hypertension    • Ingrowing toenail    • Mentally challenged    • Myofascial muscle pain    • Osteoporosis    • Otalgia    • Tinea unguium      Past Surgical History:   Procedure Laterality Date   • FOOT SURGERY  NOVEMBER 2022   • SINUS SURGERY     • SKIN CANCER EXCISION       Family History   Problem Relation Age of Onset   • Skin cancer Father    • Cancer Father         basil cell skin cancer     Social History     Socioeconomic History   • Marital status: Single   Tobacco Use   • Smoking status: Never     Passive exposure: Never   • Smokeless tobacco: Never   Vaping Use   • Vaping Use: Never used   Substance and Sexual Activity   •  Alcohol use: Never   • Drug use: Never   • Sexual activity: Never     Allergies   Allergen Reactions   • Alendronate Sodium Other (See Comments)     Severe heartburn     • Sulfa Antibiotics Rash     Legs and face swelling      Current Outpatient Medications   Medication Sig Dispense Refill   • amoxicillin-clavulanate (AUGMENTIN) 875-125 MG per tablet Take 1 tablet by mouth Every 12 (Twelve) Hours. 42 tablet 0   • baclofen (LIORESAL) 10 MG tablet baclofen 10 mg oral tablet take 1 tablet (10 mg) by oral route 3 times per day   Active     • Cholecalciferol 50 MCG (2000 UT) capsule Vitamin D3 2,000 unit oral capsule take 1 capsule by oral route daily   Active     • denosumab (Prolia) 60 MG/ML solution prefilled syringe syringe 1 mL.     • estradiol (ESTRACE) 0.1 MG/GM vaginal cream      • estrogens, conjugated, (PREMARIN) 0.625 MG tablet Premarin 0.625 mg oral tablet take 1 tablet (0.625 mg) by oral route once daily   Suspended     • fluticasone (FLONASE) 50 MCG/ACT nasal spray fluticasone 50 mcg/actuation nasal spray,suspension spray 1 spray (50 mcg) in each nostril by intranasal route once daily   Active     • ketoconazole (NIZORAL) 2 % cream Apply 1 application topically to the appropriate area as directed 2 (Two) Times a Day. APPLY TO AFFECTED AREA     • pantoprazole (PROTONIX) 40 MG EC tablet Take 1 tablet by mouth 2 (two) times a day.     • Pyrithione Zinc 0.25 % liquid DermaZinc Spray 0.25 % topical spray,non-aerosol apply as directed by topical route once   Active     • triamcinolone (KENALOG) 0.1 % cream      • XYLITOL MT xylitol sinus spray nasal spray as directed   Suspended       No current facility-administered medications for this visit.     Review of Systems   Constitutional: Negative.    Skin:        Painful toenails.   Right great toenail lateral border is healing without complications.   All other systems reviewed and are negative.      OBJECTIVE     Vitals:    03/27/23 1410   BP: 136/88   Pulse: 92    Temp: 98.4 °F (36.9 °C)   SpO2: 100%       Patient seen in no apparent distress.      PHYSICAL EXAM:     Foot/Ankle Exam:       General:   Appearance: appears stated age and healthy    Orientation: AAOx3    Affect: appropriate    Gait: antalgic    Assistance: cane    Shoe Gear:  Casual shoes    VASCULAR      Right Foot Vascularity   Normal vascular exam    Dorsalis pedis:  2+  Posterior tibial:  2+  Skin Temperature: warm    Edema Grading:  None  CFT:  < 3 seconds  Pedal Hair Growth:  Present  Varicosities: none       Left Foot Vascularity   Normal vascular exam    Dorsalis pedis:  2+  Posterior tibial:  2+  Skin Temperature: warm    Edema Grading:  None  CFT:  < 3 seconds  Pedal Hair Growth:  Present  Varicosities: none        NEUROLOGIC     Right Foot Neurologic   Normal sensation    Light touch sensation:  Normal  Vibratory sensation:  Normal  Hot/Cold sensation: normal    Protective Sensation using [place] Monofilament:  10     Left Foot Neurologic   Normal sensation    Light touch sensation:  Normal  Vibratory sensation:  Normal  Hot/cold sensation: normal    Protective Sensation using [place]essa Monofilament:  10     MUSCLE STRENGTH     Right Foot Muscle Strength   Foot dorsiflexion:  4  Foot plantar flexion:  4  Foot inversion:  4  Foot eversion:  4     Left Foot Muscle Strength   Foot dorsiflexion:  4  Foot plantar flexion:  4  Foot inversion:  4  Foot eversion:  4     RANGE OF MOTION      Right Foot Range of Motion   Foot and ankle ROM within normal limits       Left Foot Range of Motion   Foot and ankle ROM within normal limits       DERMATOLOGIC     Right Foot Dermatologic   Skin: skin intact    Nails: onychomycosis, abnormally thick, subungual debris, dystrophic nails and ingrown toenail    Nails comment:  Toenails 1, 2, 3, 4, and 5     Left Foot Dermatologic   Skin: skin intact    Nails: onychomycosis, abnormally thick, subungual debris, dystrophic nails and ingrown toenail    Nails  comment:  Toenails 1, 2, 3, 4, and 5      Right Foot Additional Comments Right great toenail lateral border is healing without complications.      ASSESSMENT/PLAN     Diagnoses and all orders for this visit:    1. Foot pain, bilateral (Primary)    2. Onychocryptosis    3. Onychomycosis    4. Difficulty walking        Comprehensive lower extremity examination and evaluation was performed.    Discussed findings and treatment plan including risks, benefits, and treatment options with patient in detail. Patient agreed with treatment plan.    Toenails 1 through 5 bilaterally were debrided in thickness and length and then smoothed with a Dremel Tool.  Tolerated the procedure well without complications.    An After Visit Summary was printed and given to the patient at discharge, including (if requested) any available informative/educational handouts regarding diagnosis, treatment, or medications. All questions were answered to patient/family satisfaction. Should symptoms fail to improve or worsen they agree to call or return to clinic or to go to the Emergency Department. Discussed the importance of following up with any needed screening tests/labs/specialist appointments and any requested follow-up recommended by me today. Importance of maintaining follow-up discussed and patient accepts that missed appointments can delay diagnosis and potentially lead to worsening of conditions.    Return in about 9 weeks (around 5/29/2023) for Toenail Care., or sooner if acute issues arise.    This document has been electronically signed by Bertin Fountain DPM on March 27, 2023 14:29 EDT

## 2023-03-28 ENCOUNTER — HOSPITAL ENCOUNTER (OUTPATIENT)
Dept: MAMMOGRAPHY | Facility: HOSPITAL | Age: 53
Discharge: HOME | End: 2023-03-28
Admitting: INTERNAL MEDICINE
Payer: MEDICARE

## 2023-03-28 DIAGNOSIS — Z12.31 SCREENING MAMMOGRAM, ENCOUNTER FOR: ICD-10-CM

## 2023-03-28 PROCEDURE — 77063 BREAST TOMOSYNTHESIS BI: CPT

## 2023-03-28 PROCEDURE — 77067 SCR MAMMO BI INCL CAD: CPT

## 2023-05-29 NOTE — PROGRESS NOTES
[facility] - PODIATRY    Today's Date: 05/30/23    Patient Name: Carmita Devi  MRN: [id number]  CSN: [id number]  PCP: Joseluis, last PCP visit: 11/30/2022  Referring Provider: No ref. provider found    SUBJECTIVE     Chief Complaint   Patient presents with   • Left Foot - Follow-up, Nail Problem   • Right Foot - Follow-up, Nail Problem     HPI: Carmita Devi, a 53 y.o.female, comes to clinic.    New, Established, New Problem:  Established    Location:  Toenails    Duration:   Greater than five years    Onset:  Gradual    Nature:  sore with palpation.    Stable, worsening, improving:   Stable    Aggravating factors:  Pain with shoe gear and ambulation.    Previous Treatment:  debridement  _______________________________________________________________________    Medical changes:  none.    Patient denies any fevers, chills, nausea, vomiting, shortness of breathe, nor any other constitutional signs nor symptoms.       Past Medical History:   Diagnosis Date   • Allergic rhinitis    • Bilateral foot pain    • Chronic frontal sinusitis    • Chronic maxillary sinusitis    • Corns and callus    • Deviated septum    • Difficulty walking    • Facial basal cell cancer    • GERD (gastroesophageal reflux disease)    • HBP (high blood pressure)    • Headache    • Hoarseness    • Hypertension    • Ingrowing toenail    • Mentally challenged    • Myofascial muscle pain    • Osteoporosis    • Otalgia    • Tinea unguium      Past Surgical History:   Procedure Laterality Date   • FOOT SURGERY  NOVEMBER 2022   • SINUS SURGERY     • SKIN CANCER EXCISION       Family History   Problem Relation Age of Onset   • Skin cancer Father    • Cancer Father         basil cell skin cancer     Social History     Socioeconomic History   • Marital status: Single   Tobacco Use   • Smoking status: Never     Passive exposure: Never   • Smokeless tobacco: Never   Vaping Use   • Vaping Use: Never used   Substance and Sexual Activity   •  Alcohol use: Never   • Drug use: Never   • Sexual activity: Never     Allergies   Allergen Reactions   • Alendronate Sodium Other (See Comments)     Severe heartburn     • Sulfa Antibiotics Rash     Legs and face swelling      Current Outpatient Medications   Medication Sig Dispense Refill   • baclofen (LIORESAL) 10 MG tablet baclofen 10 mg oral tablet take 1 tablet (10 mg) by oral route 3 times per day   Active     • Cholecalciferol 50 MCG (2000 UT) capsule Vitamin D3 2,000 unit oral capsule take 1 capsule by oral route daily   Active     • denosumab (Prolia) 60 MG/ML solution prefilled syringe syringe 1 mL.     • estradiol (ESTRACE) 0.1 MG/GM vaginal cream      • fluticasone (FLONASE) 50 MCG/ACT nasal spray fluticasone 50 mcg/actuation nasal spray,suspension spray 1 spray (50 mcg) in each nostril by intranasal route once daily   Active     • ketoconazole (NIZORAL) 2 % cream Apply 1 application topically to the appropriate area as directed 2 (Two) Times a Day. APPLY TO AFFECTED AREA     • pantoprazole (PROTONIX) 40 MG EC tablet Take 1 tablet by mouth 2 (two) times a day.     • Pyrithione Zinc 0.25 % liquid DermaZinc Spray 0.25 % topical spray,non-aerosol apply as directed by topical route once   Active     • XYLITOL MT xylitol sinus spray nasal spray as directed   Suspended     • amoxicillin-clavulanate (AUGMENTIN) 875-125 MG per tablet Take 1 tablet by mouth Every 12 (Twelve) Hours. (Patient not taking: Reported on 5/30/2023) 42 tablet 0   • estrogens, conjugated, (PREMARIN) 0.625 MG tablet Premarin 0.625 mg oral tablet take 1 tablet (0.625 mg) by oral route once daily   Suspended (Patient not taking: Reported on 5/30/2023)     • triamcinolone (KENALOG) 0.1 % cream  (Patient not taking: Reported on 5/30/2023)       No current facility-administered medications for this visit.     Review of Systems   Constitutional: Negative.    Skin:        Painful toenails.   Right great toenail lateral border is healing without  complications.   All other systems reviewed and are negative.      OBJECTIVE     Vitals:    05/30/23 1428   BP: 151/89   Pulse: 88   Temp: 97.5 °F (36.4 °C)   SpO2: 98%       Patient seen in no apparent distress.      PHYSICAL EXAM:     Foot/Ankle Exam    GENERAL  Appearance:  appears stated age  Orientation:  AAOx3  Affect:  appropriate  Gait:  antalgic  Assistance:  cane use  Right shoe gear: casual shoe  Left shoe gear: casual shoe    VASCULAR     Right Foot Vascularity   Normal vascular exam    Dorsalis pedis:  2+  Posterior tibial:  2+  Skin temperature:  warm  Edema grading:  None  CFT:  < 3 seconds  Pedal hair growth:  Present  Varicosities:  none     Left Foot Vascularity   Normal vascular exam    Dorsalis pedis:  2+  Posterior tibial:  2+  Skin temperature:  warm  Edema grading:  None  CFT:  < 3 seconds  Pedal hair growth:  Present  Varicosities:  none     NEUROLOGIC     Right Foot Neurologic   Normal sensation    Light touch sensation: normal  Vibratory sensation: normal  Hot/Cold sensation: normal  Protective Sensation using [employer]-Vanessa Monofilament:   Sites intact: 10  Sites tested: 10     Left Foot Neurologic   Normal sensation    Light touch sensation: normal  Vibratory sensation: normal  Hot/Cold sensation:  normal  Protective Sensation using [employer]-Vanessa Monofilament:   Sites intact: 10  Sites tested: 10    MUSCLE STRENGTH     Right Foot Muscle Strength   Foot dorsiflexion:  4  Foot plantar flexion:  4  Foot inversion:  4  Foot eversion:  4     Left Foot Muscle Strength   Foot dorsiflexion:  4  Foot plantar flexion:  4  Foot inversion:  4  Foot eversion:  4    RANGE OF MOTION     Right Foot Range of Motion   Foot and ankle ROM within normal limits       Left Foot Range of Motion   Foot and ankle ROM within normal limits      DERMATOLOGIC      Right Foot Dermatologic   Skin  Right foot skin is intact.   Nails  1.  Positive for elongated, onychomycosis, abnormal thickness, subungual debris and  ingrown toenail.  2.  Positive for elongated, onychomycosis, abnormal thickness, subungual debris and ingrown toenail.  3.  Positive for elongated, onychomycosis, abnormal thickness, subungual debris and ingrown toenail.  4.  Positive for elongated, onychomycosis, abnormal thickness, subungual debris and ingrown toenail.  5.  Positive for elongated, onychomycosis, abnormal thickness, subungual debris and ingrown toenail.     Left Foot Dermatologic   Skin  Left foot skin is intact.   Nails  1.  Positive for elongated, onychomycosis, abnormal thickness, subungual debris and ingrown toenail.  2.  Positive for elongated, onychomycosis, abnormal thickness, subungual debris and ingrown toenail.  3.  Positive for elongated, onychomycosis, abnormal thickness, subungual debris and ingrown toenail.  4.  Positive for elongated, onychomycosis, abnormally thick, subungual debris and ingrown toenail.  5.  Positive for elongated, onychomycosis, abnormally thick, subungual debris and ingrown toenail.     Right foot additional comments: Right great toenail lateral border is healing without complications.      ASSESSMENT/PLAN     Diagnoses and all orders for this visit:    1. Foot pain, bilateral (Primary)    2. Onychocryptosis    3. Onychomycosis    4. Difficulty walking    Comprehensive lower extremity examination and evaluation was performed.    Discussed findings and treatment plan including risks, benefits, and treatment options with patient in detail. Patient agreed with treatment plan.    Toenails 1 through 5 bilaterally were debrided in thickness and length and then smoothed with a Dremel Tool.  Tolerated the procedure well without complications.    An After Visit Summary was printed and given to the patient at discharge, including (if requested) any available informative/educational handouts regarding diagnosis, treatment, or medications. All questions were answered to patient/family satisfaction. Should symptoms fail to  improve or worsen they agree to call or return to clinic or to go to the Emergency Department. Discussed the importance of following up with any needed screening tests/labs/specialist appointments and any requested follow-up recommended by me today. Importance of maintaining follow-up discussed and patient accepts that missed appointments can delay diagnosis and potentially lead to worsening of conditions.    Return in about 9 weeks (around 8/1/2023) for Toenail Care., or sooner if acute issues arise.    This document has been electronically signed by Bertin Fountain DPM on May 30, 2023 14:56 EDT

## 2023-05-30 ENCOUNTER — OFFICE VISIT (OUTPATIENT)
Dept: PODIATRY | Facility: CLINIC | Age: 53
End: 2023-05-30

## 2023-05-30 VITALS
BODY MASS INDEX: 22.22 KG/M2 | TEMPERATURE: 97.5 F | OXYGEN SATURATION: 98 % | HEART RATE: 88 BPM | DIASTOLIC BLOOD PRESSURE: 89 MMHG | WEIGHT: 103 LBS | HEIGHT: 57 IN | SYSTOLIC BLOOD PRESSURE: 151 MMHG

## 2023-05-30 DIAGNOSIS — B35.1 ONYCHOMYCOSIS: ICD-10-CM

## 2023-05-30 DIAGNOSIS — L60.0 ONYCHOCRYPTOSIS: ICD-10-CM

## 2023-05-30 DIAGNOSIS — M79.671 FOOT PAIN, BILATERAL: Primary | ICD-10-CM

## 2023-05-30 DIAGNOSIS — R26.2 DIFFICULTY WALKING: ICD-10-CM

## 2023-05-30 DIAGNOSIS — M79.672 FOOT PAIN, BILATERAL: Primary | ICD-10-CM

## 2023-05-30 PROCEDURE — 3079F DIAST BP 80-89 MM HG: CPT | Performed by: PODIATRIST

## 2023-05-30 PROCEDURE — 3077F SYST BP >= 140 MM HG: CPT | Performed by: PODIATRIST

## 2023-05-30 PROCEDURE — 1159F MED LIST DOCD IN RCRD: CPT | Performed by: PODIATRIST

## 2023-05-30 PROCEDURE — 1160F RVW MEDS BY RX/DR IN RCRD: CPT | Performed by: PODIATRIST

## 2023-05-30 PROCEDURE — 11721 DEBRIDE NAIL 6 OR MORE: CPT | Performed by: PODIATRIST

## 2023-08-01 ENCOUNTER — OFFICE VISIT (OUTPATIENT)
Dept: PODIATRY | Facility: CLINIC | Age: 53
End: 2023-08-01
Payer: MEDICARE

## 2023-08-01 VITALS
TEMPERATURE: 98.2 F | HEART RATE: 80 BPM | SYSTOLIC BLOOD PRESSURE: 134 MMHG | WEIGHT: 102 LBS | OXYGEN SATURATION: 98 % | DIASTOLIC BLOOD PRESSURE: 83 MMHG | HEIGHT: 57 IN | BODY MASS INDEX: 22.01 KG/M2

## 2023-08-01 DIAGNOSIS — M79.671 FOOT PAIN, RIGHT: ICD-10-CM

## 2023-08-01 DIAGNOSIS — M79.672 FOOT PAIN, LEFT: ICD-10-CM

## 2023-08-01 DIAGNOSIS — L60.0 ONYCHOCRYPTOSIS: ICD-10-CM

## 2023-08-01 DIAGNOSIS — R26.2 DIFFICULTY WALKING: ICD-10-CM

## 2023-08-01 DIAGNOSIS — M79.672 FOOT PAIN, BILATERAL: Primary | ICD-10-CM

## 2023-08-01 DIAGNOSIS — M79.671 FOOT PAIN, BILATERAL: Primary | ICD-10-CM

## 2023-08-01 DIAGNOSIS — B35.1 ONYCHOMYCOSIS: ICD-10-CM

## 2023-08-08 ENCOUNTER — HOSPITAL ENCOUNTER (OUTPATIENT)
Dept: CT IMAGING | Facility: HOSPITAL | Age: 53
Discharge: HOME | End: 2023-08-08
Admitting: OTOLARYNGOLOGY
Payer: MEDICARE

## 2023-08-08 DIAGNOSIS — J31.0 CHRONIC RHINOSINUSITIS: ICD-10-CM

## 2023-08-08 DIAGNOSIS — J32.9 CHRONIC RHINOSINUSITIS: ICD-10-CM

## 2023-08-08 PROCEDURE — 70486 CT MAXILLOFACIAL W/O DYE: CPT

## 2023-08-17 ENCOUNTER — TELEPHONE (OUTPATIENT)
Dept: OTOLARYNGOLOGY | Facility: CLINIC | Age: 53
End: 2023-08-17
Payer: MEDICARE

## 2023-08-17 NOTE — TELEPHONE ENCOUNTER
Patient called requesting a call regarding CT results done on 8/8/23. Patient was told to call for results, does not have follow up.

## 2023-10-03 ENCOUNTER — OFFICE VISIT (OUTPATIENT)
Dept: PODIATRY | Facility: CLINIC | Age: 53
End: 2023-10-03
Payer: MEDICARE

## 2023-10-03 VITALS
OXYGEN SATURATION: 98 % | HEIGHT: 57 IN | TEMPERATURE: 98 F | DIASTOLIC BLOOD PRESSURE: 91 MMHG | HEART RATE: 82 BPM | BODY MASS INDEX: 21.14 KG/M2 | SYSTOLIC BLOOD PRESSURE: 151 MMHG | WEIGHT: 98 LBS

## 2023-10-03 DIAGNOSIS — M79.672 FOOT PAIN, BILATERAL: Primary | ICD-10-CM

## 2023-10-03 DIAGNOSIS — L60.0 ONYCHOCRYPTOSIS: ICD-10-CM

## 2023-10-03 DIAGNOSIS — M79.671 FOOT PAIN, BILATERAL: Primary | ICD-10-CM

## 2023-10-03 DIAGNOSIS — R26.2 DIFFICULTY WALKING: ICD-10-CM

## 2023-10-03 DIAGNOSIS — B35.1 ONYCHOMYCOSIS: ICD-10-CM

## 2023-10-03 PROCEDURE — 1159F MED LIST DOCD IN RCRD: CPT | Performed by: PODIATRIST

## 2023-10-03 PROCEDURE — 1160F RVW MEDS BY RX/DR IN RCRD: CPT | Performed by: PODIATRIST

## 2023-10-03 PROCEDURE — 11721 DEBRIDE NAIL 6 OR MORE: CPT | Performed by: PODIATRIST

## 2023-10-03 PROCEDURE — 3077F SYST BP >= 140 MM HG: CPT | Performed by: PODIATRIST

## 2023-10-03 PROCEDURE — 3080F DIAST BP >= 90 MM HG: CPT | Performed by: PODIATRIST

## 2023-10-03 NOTE — PROGRESS NOTES
[facility] - P[facility]    Today's Date: 10/03/23    Patient Name: Carmita Devi  MRN: [id number]  CSN: [id number]  PCP: Joseluis, last PCP visit: 4/4/2023  Referring Provider: No ref. provider found    SUBJECTIVE     Chief Complaint   Patient presents with    Left Foot - Follow-up, Nail Problem    Right Foot - Follow-up, Nail Problem     HPI: Carmita Devi, a 53 y.o.female, comes to clinic.    New, Established, New Problem:  Established    Location:  Toenails    Duration:   Greater than five years    Onset:  Gradual    Nature:  sore with palpation.    Stable, worsening, improving:   Stable    Aggravating factors:  Pain with shoe gear and ambulation.    Previous Treatment:  debridement  _______________________________________________________________________    No recent medical changes.    Patient denies any fevers, chills, nausea, vomiting, shortness of breathe, nor any other constitutional signs nor symptoms.       Past Medical History:   Diagnosis Date    Allergic rhinitis     Bilateral foot pain     Chronic frontal sinusitis     Chronic maxillary sinusitis     Corns and callus     Deviated septum     Difficulty walking     Facial basal cell cancer     GERD (gastroesophageal reflux disease)     HBP (high blood pressure)     Headache     Hoarseness     Hypertension     Ingrowing toenail     Mentally challenged     Myofascial muscle pain     Osteoporosis     Otalgia     Tinea unguium      Past Surgical History:   Procedure Laterality Date    FOOT SURGERY  NOVEMBER 2022    SINUS SURGERY      SKIN CANCER EXCISION       Family History   Problem Relation Age of Onset    Skin cancer Father     Cancer Father         basil cell skin cancer     Social History     Socioeconomic History    Marital status: Single   Tobacco Use    Smoking status: Never     Passive exposure: Never    Smokeless tobacco: Never   Vaping Use    Vaping Use: Never used   Substance and Sexual Activity    Alcohol use: Never    Drug  use: Never    Sexual activity: Never     Allergies   Allergen Reactions    Alendronate Sodium Other (See Comments)     Severe heartburn      Sulfa Antibiotics Rash     Legs and face swelling      Current Outpatient Medications   Medication Sig Dispense Refill    baclofen (LIORESAL) 10 MG tablet baclofen 10 mg oral tablet take 1 tablet (10 mg) by oral route 3 times per day   Active      Cholecalciferol 50 MCG (2000 UT) capsule Vitamin D3 2,000 unit oral capsule take 1 capsule by oral route daily   Active      denosumab (Prolia) 60 MG/ML solution prefilled syringe syringe 1 mL.      estradiol (ESTRACE) 0.1 MG/GM vaginal cream       estrogens, conjugated, (PREMARIN) 0.625 MG tablet       fluticasone (FLONASE) 50 MCG/ACT nasal spray fluticasone 50 mcg/actuation nasal spray,suspension spray 1 spray (50 mcg) in each nostril by intranasal route once daily   Active      ketoconazole (NIZORAL) 2 % cream Apply 1 application  topically to the appropriate area as directed 2 (Two) Times a Day. APPLY TO AFFECTED AREA      pantoprazole (PROTONIX) 40 MG EC tablet Take 1 tablet by mouth 2 (two) times a day.      Pyrithione Zinc 0.25 % liquid DermaZinc Spray 0.25 % topical spray,non-aerosol apply as directed by topical route once   Active      Sodium Chloride-Xylitol (XLEAR SINUS CARE SPRAY NA) Nasally      triamcinolone (KENALOG) 0.1 % cream       XYLITOL MT xylitol sinus spray nasal spray as directed   Suspended       No current facility-administered medications for this visit.     Review of Systems   Constitutional: Negative.    Skin:         Painful toenails.   Right great toenail lateral border is healing without complications.   All other systems reviewed and are negative.    OBJECTIVE     Vitals:    10/03/23 1359   BP: 151/91   Pulse: 82   Temp: 98 °F (36.7 °C)   SpO2: 98%         Patient seen in no apparent distress.      PHYSICAL EXAM:     Foot/Ankle Exam    GENERAL  Appearance:  appears stated age  Orientation:   AAOx3  Affect:  appropriate  Gait:  antalgic  Assistance:  cane use  Right shoe gear: casual shoe  Left shoe gear: casual shoe    VASCULAR     Right Foot Vascularity   Normal vascular exam    Dorsalis pedis:  2+  Posterior tibial:  2+  Skin temperature:  warm  Edema grading:  None  CFT:  < 3 seconds  Pedal hair growth:  Present  Varicosities:  none     Left Foot Vascularity   Normal vascular exam    Dorsalis pedis:  2+  Posterior tibial:  2+  Skin temperature:  warm  Edema grading:  None  CFT:  < 3 seconds  Pedal hair growth:  Present  Varicosities:  none     NEUROLOGIC     Right Foot Neurologic   Normal sensation    Light touch sensation: normal  Vibratory sensation: normal  Hot/Cold sensation: normal  Protective Sensation using [place]-Vanessa Monofilament:   Sites intact: 10  Sites tested: 10     Left Foot Neurologic   Normal sensation    Light touch sensation: normal  Vibratory sensation: normal  Hot/Cold sensation:  normal  Protective Sensation using [place]-Vanessa Monofilament:   Sites intact: 10  Sites tested: 10    MUSCULOSKELETAL     Right Foot Musculoskeletal   Arch:  Pes cavus     Left Foot Musculoskeletal   Arch:  Pes cavus    MUSCLE STRENGTH     Right Foot Muscle Strength   Foot dorsiflexion:  4-  Foot plantar flexion:  4-  Foot inversion:  4-  Foot eversion:  4-     Left Foot Muscle Strength   Foot dorsiflexion:  4-  Foot plantar flexion:  4-  Foot inversion:  4-  Foot eversion:  4-    RANGE OF MOTION     Right Foot Range of Motion   Foot and ankle ROM within normal limits       Left Foot Range of Motion   Foot and ankle ROM within normal limits      DERMATOLOGIC      Right Foot Dermatologic   Skin  Right foot skin is intact.   Nails  1.  Positive for elongated, onychomycosis, abnormal thickness, subungual debris and ingrown toenail.  2.  Positive for elongated, onychomycosis, abnormal thickness, subungual debris and ingrown toenail.  3.  Positive for elongated, onychomycosis, abnormal thickness,  subungual debris and ingrown toenail.  4.  Positive for elongated, onychomycosis, abnormal thickness, subungual debris and ingrown toenail.  5.  Positive for elongated, onychomycosis, abnormal thickness, subungual debris and ingrown toenail.     Left Foot Dermatologic   Skin  Left foot skin is intact.   Nails  1.  Positive for elongated, onychomycosis, abnormal thickness, subungual debris and ingrown toenail.  2.  Positive for elongated, onychomycosis, abnormal thickness, subungual debris and ingrown toenail.  3.  Positive for elongated, onychomycosis, abnormal thickness, subungual debris and ingrown toenail.  4.  Positive for elongated, onychomycosis, abnormally thick, subungual debris and ingrown toenail.  5.  Positive for elongated, onychomycosis, abnormally thick, subungual debris and ingrown toenail.    ASSESSMENT/PLAN     Diagnoses and all orders for this visit:    1. Foot pain, bilateral (Primary)    2. Onychocryptosis    3. Onychomycosis    4. Difficulty walking    Comprehensive lower extremity examination and evaluation was performed.    Discussed findings and treatment plan including risks, benefits, and treatment options with patient in detail. Patient agreed with treatment plan.    Toenails 1 through 5 bilaterally were debrided in thickness and length and then smoothed with a Dremel Tool.  Tolerated the procedure well without complications.    An After Visit Summary was printed and given to the patient at discharge, including (if requested) any available informative/educational handouts regarding diagnosis, treatment, or medications. All questions were answered to patient/family satisfaction. Should symptoms fail to improve or worsen they agree to call or return to clinic or to go to the Emergency Department. Discussed the importance of following up with any needed screening tests/labs/specialist appointments and any requested follow-up recommended by me today. Importance of maintaining follow-up  discussed and patient accepts that missed appointments can delay diagnosis and potentially lead to worsening of conditions.    Return in about 9 weeks (around 12/5/2023) for Toenail Care., or sooner if acute issues arise.    This document has been electronically signed by Bertin Fountain DPM on October 3, 2023 14:20 EDT

## 2023-12-05 ENCOUNTER — OFFICE VISIT (OUTPATIENT)
Dept: PODIATRY | Facility: CLINIC | Age: 53
End: 2023-12-05
Payer: MEDICARE

## 2023-12-05 VITALS
WEIGHT: 103 LBS | BODY MASS INDEX: 22.29 KG/M2 | OXYGEN SATURATION: 98 % | HEART RATE: 69 BPM | SYSTOLIC BLOOD PRESSURE: 135 MMHG | TEMPERATURE: 97.4 F | DIASTOLIC BLOOD PRESSURE: 88 MMHG

## 2023-12-05 DIAGNOSIS — M79.672 FOOT PAIN, BILATERAL: Primary | ICD-10-CM

## 2023-12-05 DIAGNOSIS — B35.1 ONYCHOMYCOSIS: ICD-10-CM

## 2023-12-05 DIAGNOSIS — M79.671 FOOT PAIN, BILATERAL: Primary | ICD-10-CM

## 2023-12-05 DIAGNOSIS — R26.2 DIFFICULTY WALKING: ICD-10-CM

## 2023-12-05 DIAGNOSIS — L60.0 ONYCHOCRYPTOSIS: ICD-10-CM

## 2023-12-05 NOTE — PROGRESS NOTES
[facility]    Today's Date: 12/05/23    Patient Name: Carmita Devi  MRN: [id number]  CSN: [id number]  PCP: Joseluis, last PCP visit: 4/4/2023  Referring Provider: No ref. provider found    SUBJECTIVE     Chief Complaint   Patient presents with    Left Foot - Follow-up, Nail Problem    Right Foot - Follow-up, Nail Problem     HPI: Carmita Devi, a 53 y.o.female, comes to clinic.    New, Established, New Problem:  Established    Location:  Toenails    Duration:   Greater than five years    Onset:  Gradual    Nature:  sore with palpation.    Stable, worsening, improving:   Stable    Aggravating factors:  Pain with shoe gear and ambulation.    Previous Treatment:  debridement  _______________________________________________________________________    Medical changes:  none.    Patient denies any fevers, chills, nausea, vomiting, shortness of breathe, nor any other constitutional signs nor symptoms.       Past Medical History:   Diagnosis Date    Allergic rhinitis     Bilateral foot pain     Chronic frontal sinusitis     Chronic maxillary sinusitis     Corns and callus     Deviated septum     Difficulty walking     Facial basal cell cancer     GERD (gastroesophageal reflux disease)     HBP (high blood pressure)     Headache     Hoarseness     Hypertension     Ingrowing toenail     Mentally challenged     Myofascial muscle pain     Osteoporosis     Otalgia     Tinea unguium      Past Surgical History:   Procedure Laterality Date    FOOT SURGERY  NOVEMBER 2022    SINUS SURGERY      SKIN CANCER EXCISION       Family History   Problem Relation Age of Onset    Skin cancer Father     Cancer Father         basil cell skin cancer     Social History     Socioeconomic History    Marital status: Single   Tobacco Use    Smoking status: Never     Passive exposure: Never    Smokeless tobacco: Never   Vaping Use    Vaping Use: Never used   Substance and Sexual Activity    Alcohol use: Never    Drug use:  Never    Sexual activity: Never     Allergies   Allergen Reactions    Alendronate Sodium Other (See Comments)     Severe heartburn      Sulfa Antibiotics Rash     Legs and face swelling      Current Outpatient Medications   Medication Sig Dispense Refill    baclofen (LIORESAL) 10 MG tablet baclofen 10 mg oral tablet take 1 tablet (10 mg) by oral route 3 times per day   Active      Cholecalciferol 50 MCG (2000 UT) capsule Vitamin D3 2,000 unit oral capsule take 1 capsule by oral route daily   Active      denosumab (Prolia) 60 MG/ML solution prefilled syringe syringe 1 mL.      estradiol (ESTRACE) 0.1 MG/GM vaginal cream       estrogens, conjugated, (PREMARIN) 0.625 MG tablet       fluticasone (FLONASE) 50 MCG/ACT nasal spray fluticasone 50 mcg/actuation nasal spray,suspension spray 1 spray (50 mcg) in each nostril by intranasal route once daily   Active      ketoconazole (NIZORAL) 2 % cream Apply 1 application  topically to the appropriate area as directed 2 (Two) Times a Day. APPLY TO AFFECTED AREA      pantoprazole (PROTONIX) 40 MG EC tablet Take 1 tablet by mouth 2 (two) times a day.      Pyrithione Zinc 0.25 % liquid DermaZinc Spray 0.25 % topical spray,non-aerosol apply as directed by topical route once   Active      Sodium Chloride-Xylitol (XLEAR SINUS CARE SPRAY NA) Nasally      triamcinolone (KENALOG) 0.1 % cream       XYLITOL MT xylitol sinus spray nasal spray as directed   Suspended       No current facility-administered medications for this visit.     Review of Systems   Constitutional: Negative.    Skin:         Painful toenails.   Right great toenail lateral border is healing without complications.   All other systems reviewed and are negative.      OBJECTIVE     Vitals:    12/05/23 1424   BP: 135/88   Pulse: 69   Temp: 97.4 °F (36.3 °C)   SpO2: 98%         Patient seen in no apparent distress.      PHYSICAL EXAM:     Foot/Ankle Exam    GENERAL  Appearance:  appears stated age  Orientation:   AAOx3  Affect:  appropriate  Gait:  antalgic  Assistance:  cane use  Right shoe gear: casual shoe  Left shoe gear: casual shoe    VASCULAR     Right Foot Vascularity   Normal vascular exam    Dorsalis pedis:  2+  Posterior tibial:  2+  Skin temperature:  warm  Edema grading:  None  CFT:  < 3 seconds  Pedal hair growth:  Present  Varicosities:  none     Left Foot Vascularity   Normal vascular exam    Dorsalis pedis:  2+  Posterior tibial:  2+  Skin temperature:  warm  Edema grading:  None  CFT:  < 3 seconds  Pedal hair growth:  Present  Varicosities:  none     NEUROLOGIC     Right Foot Neurologic   Normal sensation    Light touch sensation: normal  Vibratory sensation: normal  Hot/Cold sensation: normal  Protective Sensation using [place] Monofilament:   Sites intact: 10  Sites tested: 10     Left Foot Neurologic   Normal sensation    Light touch sensation: normal  Vibratory sensation: normal  Hot/Cold sensation:  normal  Protective Sensation using [place] Monofilament:   Sites intact: 10  Sites tested: 10    MUSCULOSKELETAL     Right Foot Musculoskeletal   Arch:  Pes cavus     Left Foot Musculoskeletal   Arch:  Pes cavus    MUSCLE STRENGTH     Right Foot Muscle Strength   Foot dorsiflexion:  4-  Foot plantar flexion:  4-  Foot inversion:  4-  Foot eversion:  4-     Left Foot Muscle Strength   Foot dorsiflexion:  4-  Foot plantar flexion:  4-  Foot inversion:  4-  Foot eversion:  4-    RANGE OF MOTION     Right Foot Range of Motion   Foot and ankle ROM within normal limits       Left Foot Range of Motion   Foot and ankle ROM within normal limits      DERMATOLOGIC      Right Foot Dermatologic   Skin  Right foot skin is intact.   Nails  1.  Positive for elongated, onychomycosis, abnormal thickness, subungual debris and ingrown toenail.  2.  Positive for elongated, onychomycosis, abnormal thickness, subungual debris and ingrown toenail.  3.  Positive for elongated, onychomycosis, abnormal thickness,  subungual debris and ingrown toenail.  4.  Positive for elongated, onychomycosis, abnormal thickness, subungual debris and ingrown toenail.  5.  Positive for elongated, onychomycosis, abnormal thickness, subungual debris and ingrown toenail.     Left Foot Dermatologic   Skin  Left foot skin is intact.   Nails  1.  Positive for elongated, onychomycosis, abnormal thickness, subungual debris and ingrown toenail.  2.  Positive for elongated, onychomycosis, abnormal thickness, subungual debris and ingrown toenail.  3.  Positive for elongated, onychomycosis, abnormal thickness, subungual debris and ingrown toenail.  4.  Positive for elongated, onychomycosis, abnormally thick, subungual debris and ingrown toenail.  5.  Positive for elongated, onychomycosis, abnormally thick, subungual debris and ingrown toenail.      ASSESSMENT/PLAN     Diagnoses and all orders for this visit:    1. Foot pain, bilateral (Primary)    2. Onychocryptosis    3. Onychomycosis    4. Difficulty walking    Comprehensive lower extremity examination and evaluation was performed.    Discussed findings and treatment plan including risks, benefits, and treatment options with patient in detail. Patient agreed with treatment plan.    Toenails 1 through 5 bilaterally were debrided in thickness and length and then smoothed with a Dremel Tool.  Tolerated the procedure well without complications.    An After Visit Summary was printed and given to the patient at discharge, including (if requested) any available informative/educational handouts regarding diagnosis, treatment, or medications. All questions were answered to patient/family satisfaction. Should symptoms fail to improve or worsen they agree to call or return to clinic or to go to the Emergency Department. Discussed the importance of following up with any needed screening tests/labs/specialist appointments and any requested follow-up recommended by me today. Importance of maintaining follow-up  discussed and patient accepts that missed appointments can delay diagnosis and potentially lead to worsening of conditions.    Return in about 9 weeks (around 2/6/2024) for Toenail Care., or sooner if acute issues arise.    This document has been electronically signed by Bertin Fountain DPM on December 5, 2023 14:45 EST

## 2023-12-20 ENCOUNTER — TRANSCRIBE ORDERS (OUTPATIENT)
Dept: ADMINISTRATIVE | Facility: HOSPITAL | Age: 53
End: 2023-12-20
Payer: MEDICARE

## 2023-12-20 DIAGNOSIS — M81.0 SENILE OSTEOPOROSIS: Primary | ICD-10-CM

## 2024-01-24 ENCOUNTER — HOSPITAL ENCOUNTER (OUTPATIENT)
Dept: BONE DENSITY | Facility: HOSPITAL | Age: 54
Discharge: HOME | End: 2024-01-24
Admitting: INTERNAL MEDICINE
Payer: MEDICARE

## 2024-01-24 DIAGNOSIS — M81.0 SENILE OSTEOPOROSIS: ICD-10-CM

## 2024-01-24 PROCEDURE — 77080 DXA BONE DENSITY AXIAL: CPT

## 2024-01-30 ENCOUNTER — OFFICE VISIT (OUTPATIENT)
Dept: PODIATRY | Facility: CLINIC | Age: 54
End: 2024-01-30
Payer: MEDICARE

## 2024-01-30 VITALS
TEMPERATURE: 98.3 F | DIASTOLIC BLOOD PRESSURE: 87 MMHG | HEART RATE: 86 BPM | WEIGHT: 106 LBS | SYSTOLIC BLOOD PRESSURE: 144 MMHG | OXYGEN SATURATION: 97 % | BODY MASS INDEX: 22.94 KG/M2

## 2024-01-30 DIAGNOSIS — L60.0 ONYCHOCRYPTOSIS: ICD-10-CM

## 2024-01-30 DIAGNOSIS — M79.671 FOOT PAIN, RIGHT: Primary | ICD-10-CM

## 2024-01-30 DIAGNOSIS — R26.2 DIFFICULTY WALKING: ICD-10-CM

## 2024-01-30 NOTE — PROGRESS NOTES
[facility] - PODIATRY    Today's Date: 01/30/24    Patient Name: Carmita Devi  MRN: [id number]  CSN: [id number]  PCP: Joseluis, last PCP visit: 4/4/2023  Referring Provider: No ref. provider found    SUBJECTIVE     Chief Complaint   Patient presents with        Right Foot - Follow-up, Nail Problem     HPI: Carmita Devi, a 53 y.o.female, comes to clinic.    New, Established, New Problem: New problem  Location: Right first lateral border  Duration: Over the past week  Onset: Insidious  Nature: Sore, ingrown  Aggravating factors:  Patient relates pain is aggravated by shoe gear and ambulation.    Previous Treatment: Previous debridement    Patient denies any fevers, chills, nausea, vomiting, shortness of breathe, nor any other constitutional signs nor symptoms.  _______________________________________________________________________    Medical changes:  none.    Patient denies any fevers, chills, nausea, vomiting, shortness of breathe, nor any other constitutional signs nor symptoms.       Past Medical History:   Diagnosis Date    Allergic rhinitis     Bilateral foot pain     Chronic frontal sinusitis     Chronic maxillary sinusitis     Corns and callus     Deviated septum     Difficulty walking     Facial basal cell cancer     GERD (gastroesophageal reflux disease)     HBP (high blood pressure)     Headache     Hoarseness     Hypertension     Ingrowing toenail     Mentally challenged     Myofascial muscle pain     Osteoporosis     Otalgia     Tinea unguium      Past Surgical History:   Procedure Laterality Date    FOOT SURGERY  NOVEMBER 2022    SINUS SURGERY      SKIN CANCER EXCISION       Family History   Problem Relation Age of Onset    Skin cancer Father     Cancer Father         basil cell skin cancer     Social History     Socioeconomic History    Marital status: Single   Tobacco Use    Smoking status: Never     Passive exposure: Never    Smokeless tobacco: Never   Vaping Use    Vaping Use:  Never used   Substance and Sexual Activity    Alcohol use: Never    Drug use: Never    Sexual activity: Never     Allergies   Allergen Reactions    Alendronate Sodium Other (See Comments)     Severe heartburn      Sulfa Antibiotics Rash     Legs and face swelling      Current Outpatient Medications   Medication Sig Dispense Refill    baclofen (LIORESAL) 10 MG tablet baclofen 10 mg oral tablet take 1 tablet (10 mg) by oral route 3 times per day   Active      Cholecalciferol 50 MCG (2000 UT) capsule Vitamin D3 2,000 unit oral capsule take 1 capsule by oral route daily   Active      denosumab (Prolia) 60 MG/ML solution prefilled syringe syringe 1 mL.      estradiol (ESTRACE) 0.1 MG/GM vaginal cream       fluticasone (FLONASE) 50 MCG/ACT nasal spray fluticasone 50 mcg/actuation nasal spray,suspension spray 1 spray (50 mcg) in each nostril by intranasal route once daily   Active      ketoconazole (NIZORAL) 2 % cream Apply 1 Application topically to the appropriate area as directed 2 (Two) Times a Day. APPLY TO AFFECTED AREA      pantoprazole (PROTONIX) 40 MG EC tablet Take 1 tablet by mouth 2 (two) times a day.      Pyrithione Zinc 0.25 % liquid DermaZinc Spray 0.25 % topical spray,non-aerosol apply as directed by topical route once   Active      Sodium Chloride-Xylitol (XLEAR SINUS CARE SPRAY NA) Nasally      triamcinolone (KENALOG) 0.1 % cream       XYLITOL MT xylitol sinus spray nasal spray as directed   Suspended      estrogens, conjugated, (PREMARIN) 0.625 MG tablet  (Patient not taking: Reported on 1/30/2024)       No current facility-administered medications for this visit.     Review of Systems   Constitutional: Negative.    Skin:         Right great toenail lateral border is in-grown.   All other systems reviewed and are negative.      OBJECTIVE     Vitals:    01/30/24 1436   BP: 144/87   Pulse: 86   Temp: 98.3 °F (36.8 °C)   SpO2: 97%         Patient seen in no apparent distress.      PHYSICAL EXAM:      Foot/Ankle Exam    GENERAL  Appearance:  appears stated age  Orientation:  AAOx3  Affect:  appropriate  Gait:  antalgic  Assistance:  cane use  Right shoe gear: casual shoe  Left shoe gear: casual shoe    VASCULAR     Right Foot Vascularity   Normal vascular exam    Dorsalis pedis:  2+  Posterior tibial:  2+  Skin temperature:  warm  Edema grading:  None  CFT:  < 3 seconds  Pedal hair growth:  Present  Varicosities:  none     Left Foot Vascularity   Normal vascular exam    Dorsalis pedis:  2+  Posterior tibial:  2+  Skin temperature:  warm  Edema grading:  None  CFT:  < 3 seconds  Pedal hair growth:  Present  Varicosities:  none     NEUROLOGIC     Right Foot Neurologic   Normal sensation    Light touch sensation: normal  Vibratory sensation: normal  Hot/Cold sensation: normal  Protective Sensation using [place]-Vanessa Monofilament:   Sites intact: 10  Sites tested: 10     Left Foot Neurologic   Normal sensation    Light touch sensation: normal  Vibratory sensation: normal  Hot/Cold sensation:  normal  Protective Sensation using [place] Monofilament:   Sites intact: 10  Sites tested: 10    MUSCULOSKELETAL     Right Foot Musculoskeletal   Arch:  Pes cavus     Left Foot Musculoskeletal   Arch:  Pes cavus    MUSCLE STRENGTH     Right Foot Muscle Strength   Foot dorsiflexion:  4-  Foot plantar flexion:  4-  Foot inversion:  4-  Foot eversion:  4-     Left Foot Muscle Strength   Foot dorsiflexion:  4-  Foot plantar flexion:  4-  Foot inversion:  4-  Foot eversion:  4-    RANGE OF MOTION     Right Foot Range of Motion   Foot and ankle ROM within normal limits       Left Foot Range of Motion   Foot and ankle ROM within normal limits      DERMATOLOGIC      Right Foot Dermatologic   Skin  Right foot skin is intact.   Nails  1.  Positive for ingrown toenail and paronychia. (Lateral nail border)     Left Foot Dermatologic   Skin  Left foot skin is intact.      Right foot additional comments: Slant back debridement  - This procedure is indicated for onychocryptosis. Indications, risks and benefits and alternative treatments have been discussed with this patient who has agreed to this procedure. The area was prepped with isopropyl alcohol solution.  The offending nailborder was excised via slant back debridement with tissue nippers.  A sterile dressing was applied. The patient tolerated the procedure well.      ASSESSMENT/PLAN     Diagnoses and all orders for this visit:    1. Foot pain, right (Primary)    2. Onychocryptosis    3. Difficulty walking    Comprehensive lower extremity examination and evaluation was performed.    Discussed findings and treatment plan including risks, benefits, and treatment options with patient in detail. Patient agreed with treatment plan.    Patient is to monitor for recurrence and any new symptoms and to contact Dr. Fountain's office for a follow-up appointment.      The patient states understanding and agreement with this plan.    An After Visit Summary was printed and given to the patient at discharge, including (if requested) any available informative/educational handouts regarding diagnosis, treatment, or medications. All questions were answered to patient/family satisfaction. Should symptoms fail to improve or worsen they agree to call or return to clinic or to go to the Emergency Department. Discussed the importance of following up with any needed screening tests/labs/specialist appointments and any requested follow-up recommended by me today. Importance of maintaining follow-up discussed and patient accepts that missed appointments can delay diagnosis and potentially lead to worsening of conditions.    Return in about 2 weeks (around 2/13/2024) for Toenail Care., or sooner if acute issues arise.    This document has been electronically signed by Bertin Fountain DPM on January 30, 2024 15:02 EST

## 2024-02-13 ENCOUNTER — OFFICE VISIT (OUTPATIENT)
Dept: PODIATRY | Facility: CLINIC | Age: 54
End: 2024-02-13
Payer: MEDICARE

## 2024-02-13 VITALS
HEART RATE: 94 BPM | WEIGHT: 106 LBS | TEMPERATURE: 98.6 F | HEIGHT: 57 IN | SYSTOLIC BLOOD PRESSURE: 133 MMHG | OXYGEN SATURATION: 99 % | BODY MASS INDEX: 22.87 KG/M2 | DIASTOLIC BLOOD PRESSURE: 86 MMHG

## 2024-02-13 DIAGNOSIS — M79.672 FOOT PAIN, BILATERAL: ICD-10-CM

## 2024-02-13 DIAGNOSIS — R26.2 DIFFICULTY WALKING: Primary | ICD-10-CM

## 2024-02-13 DIAGNOSIS — M79.671 FOOT PAIN, BILATERAL: ICD-10-CM

## 2024-02-13 DIAGNOSIS — L60.0 ONYCHOCRYPTOSIS: ICD-10-CM

## 2024-02-13 DIAGNOSIS — B35.1 ONYCHOMYCOSIS: ICD-10-CM

## 2024-03-06 ENCOUNTER — TRANSCRIBE ORDERS (OUTPATIENT)
Dept: ADMINISTRATIVE | Facility: HOSPITAL | Age: 54
End: 2024-03-06
Payer: MEDICARE

## 2024-03-06 DIAGNOSIS — Z12.31 ENCOUNTER FOR SCREENING MAMMOGRAM FOR MALIGNANT NEOPLASM OF BREAST: Primary | ICD-10-CM

## 2024-04-16 ENCOUNTER — OFFICE VISIT (OUTPATIENT)
Dept: PODIATRY | Facility: CLINIC | Age: 54
End: 2024-04-16
Payer: MEDICARE

## 2024-04-16 VITALS
HEIGHT: 57 IN | DIASTOLIC BLOOD PRESSURE: 99 MMHG | WEIGHT: 103 LBS | HEART RATE: 90 BPM | SYSTOLIC BLOOD PRESSURE: 158 MMHG | TEMPERATURE: 98 F | OXYGEN SATURATION: 99 % | BODY MASS INDEX: 22.22 KG/M2

## 2024-04-16 DIAGNOSIS — M79.672 FOOT PAIN, BILATERAL: ICD-10-CM

## 2024-04-16 DIAGNOSIS — R26.2 DIFFICULTY WALKING: Primary | ICD-10-CM

## 2024-04-16 DIAGNOSIS — L60.0 ONYCHOCRYPTOSIS: ICD-10-CM

## 2024-04-16 DIAGNOSIS — M79.671 FOOT PAIN, BILATERAL: ICD-10-CM

## 2024-04-16 DIAGNOSIS — B35.1 ONYCHOMYCOSIS: ICD-10-CM

## 2024-04-16 PROCEDURE — 3077F SYST BP >= 140 MM HG: CPT | Performed by: PODIATRIST

## 2024-04-16 PROCEDURE — 3080F DIAST BP >= 90 MM HG: CPT | Performed by: PODIATRIST

## 2024-04-16 PROCEDURE — 1160F RVW MEDS BY RX/DR IN RCRD: CPT | Performed by: PODIATRIST

## 2024-04-16 PROCEDURE — 11721 DEBRIDE NAIL 6 OR MORE: CPT | Performed by: PODIATRIST

## 2024-04-16 PROCEDURE — 1159F MED LIST DOCD IN RCRD: CPT | Performed by: PODIATRIST

## 2024-04-16 NOTE — PROGRESS NOTES
[facility] - P[facility]RY    Today's Date: 04/16/24    Patient Name: Carmita Devi  MRN: [id number]  CSN: [id number]  PCP: Joseluis, last PCP visit: 11/2/2023  Referring Provider: No ref. provider found    SUBJECTIVE     Chief Complaint   Patient presents with    Left Foot - Follow-up, Nail Problem, Callouses    Right Foot - Follow-up, Nail Problem, Callouses     HPI: Carmita Devi, a 54 y.o.female, comes to clinic.    New, Established, New Problem:  Established    Location:  Toenails    Duration:   Greater than five years    Onset:  Gradual    Nature:  sore with palpation.    Stable, worsening, improving:   Stable    Aggravating factors:  Pain with shoe gear and ambulation.    Previous Treatment:  debridement  _______________________________________________________________________    Medical changes:  no changes.    Patient denies any fevers, chills, nausea, vomiting, shortness of breathe, nor any other constitutional signs nor symptoms.     I have reviewed/confirmed previously documented HPI with no changes.     Past Medical History:   Diagnosis Date    Allergic rhinitis     Bilateral foot pain     Chronic frontal sinusitis     Chronic maxillary sinusitis     Corns and callus     Deviated septum     Difficulty walking     Facial basal cell cancer     GERD (gastroesophageal reflux disease)     HBP (high blood pressure)     Headache     Hoarseness     Hypertension     Ingrowing toenail     Mentally challenged     Myofascial muscle pain     Osteoporosis     Otalgia     Tinea unguium      Past Surgical History:   Procedure Laterality Date    FOOT SURGERY  NOVEMBER 2022    SINUS SURGERY      SKIN CANCER EXCISION       Family History   Problem Relation Age of Onset    Skin cancer Father     Cancer Father         basil cell skin cancer     Social History     Socioeconomic History    Marital status: Single   Tobacco Use    Smoking status: Never     Passive exposure: Never    Smokeless tobacco: Never    Vaping Use    Vaping status: Never Used   Substance and Sexual Activity    Alcohol use: Never    Drug use: Never    Sexual activity: Never     Allergies   Allergen Reactions    Alendronate Sodium Other (See Comments)     Severe heartburn      Sulfa Antibiotics Rash     Legs and face swelling      Current Outpatient Medications   Medication Sig Dispense Refill    baclofen (LIORESAL) 10 MG tablet baclofen 10 mg oral tablet take 1 tablet (10 mg) by oral route 3 times per day   Active      Cholecalciferol 50 MCG (2000 UT) capsule Vitamin D3 2,000 unit oral capsule take 1 capsule by oral route daily   Active      denosumab (Prolia) 60 MG/ML solution prefilled syringe syringe 1 mL.      estradiol (ESTRACE) 0.1 MG/GM vaginal cream       estrogens, conjugated, (PREMARIN) 0.625 MG tablet       fluticasone (FLONASE) 50 MCG/ACT nasal spray fluticasone 50 mcg/actuation nasal spray,suspension spray 1 spray (50 mcg) in each nostril by intranasal route once daily   Active      ketoconazole (NIZORAL) 2 % cream Apply 1 Application topically to the appropriate area as directed 2 (Two) Times a Day. APPLY TO AFFECTED AREA      pantoprazole (PROTONIX) 40 MG EC tablet Take 1 tablet by mouth 2 (two) times a day.      Pyrithione Zinc 0.25 % liquid DermaZinc Spray 0.25 % topical spray,non-aerosol apply as directed by topical route once   Active      Sodium Chloride-Xylitol (XLEAR SINUS CARE SPRAY NA) Nasally      triamcinolone (KENALOG) 0.1 % cream       XYLITOL MT xylitol sinus spray nasal spray as directed   Suspended       No current facility-administered medications for this visit.     Review of Systems   Constitutional: Negative.    Skin:         Painful toenails.   Right great toenail lateral border is healing without complications.   All other systems reviewed and are negative.      OBJECTIVE     Vitals:    04/16/24 1345   BP: 158/99   Pulse: 90   Temp: 98 °F (36.7 °C)   SpO2: 99%           Patient seen in no apparent distress.       PHYSICAL EXAM:     Foot/Ankle Exam    GENERAL  Appearance:  appears stated age  Orientation:  AAOx3  Affect:  appropriate  Gait:  antalgic  Assistance:  cane use  Right shoe gear: casual shoe  Left shoe gear: casual shoe    VASCULAR     Right Foot Vascularity   Normal vascular exam    Dorsalis pedis:  2+  Posterior tibial:  2+  Skin temperature:  warm  Edema grading:  None  CFT:  < 3 seconds  Pedal hair growth:  Present  Varicosities:  none     Left Foot Vascularity   Normal vascular exam    Dorsalis pedis:  2+  Posterior tibial:  2+  Skin temperature:  warm  Edema grading:  None  CFT:  < 3 seconds  Pedal hair growth:  Present  Varicosities:  none     NEUROLOGIC     Right Foot Neurologic   Normal sensation    Light touch sensation: normal  Vibratory sensation: normal  Hot/Cold sensation: normal  Protective Sensation using [place]-Vanessa Monofilament:   Sites intact: 10  Sites tested: 10     Left Foot Neurologic   Normal sensation    Light touch sensation: normal  Vibratory sensation: normal  Hot/Cold sensation:  normal  Protective Sensation using [place]-Vanessa Monofilament:   Sites intact: 10  Sites tested: 10    MUSCULOSKELETAL     Right Foot Musculoskeletal   Arch:  Pes cavus     Left Foot Musculoskeletal   Arch:  Pes cavus    MUSCLE STRENGTH     Right Foot Muscle Strength   Foot dorsiflexion:  4-  Foot plantar flexion:  4-  Foot inversion:  4-  Foot eversion:  4-     Left Foot Muscle Strength   Foot dorsiflexion:  4-  Foot plantar flexion:  4-  Foot inversion:  4-  Foot eversion:  4-    RANGE OF MOTION     Right Foot Range of Motion   Foot and ankle ROM within normal limits       Left Foot Range of Motion   Foot and ankle ROM within normal limits      DERMATOLOGIC      Right Foot Dermatologic   Skin  Right foot skin is intact.   Nails  2.  Positive for elongated, onychomycosis, abnormal thickness, subungual debris and ingrown toenail.  3.  Positive for elongated, onychomycosis, abnormal thickness,  subungual debris and ingrown toenail.  4.  Positive for elongated, onychomycosis, abnormal thickness, subungual debris and ingrown toenail.  5.  Positive for elongated, onychomycosis, abnormal thickness, subungual debris and ingrown toenail.     Left Foot Dermatologic   Skin  Left foot skin is intact.   Nails  1.  Positive for elongated, onychomycosis, abnormal thickness, subungual debris and ingrown toenail.  2.  Positive for elongated, onychomycosis, abnormal thickness, subungual debris and ingrown toenail.  3.  Positive for elongated, onychomycosis, abnormal thickness, subungual debris and ingrown toenail.  4.  Positive for elongated, onychomycosis, abnormally thick, subungual debris and ingrown toenail.  5.  Positive for elongated, onychomycosis, abnormally thick, subungual debris and ingrown toenail.    I have reexamined the patient the results are consistent with the previously documented exam.      ASSESSMENT/PLAN     Diagnoses and all orders for this visit:    1. Difficulty walking (Primary)    2. Foot pain, bilateral    3. Onychomycosis    4. Onychocryptosis    Comprehensive lower extremity examination and evaluation was performed.    Discussed findings and treatment plan including risks, benefits, and treatment options with patient in detail. Patient agreed with treatment plan.    Toenails 2, 3, 4, 5 on Right and 1, 2, 3, 4, 5 on Left were debrided with nail nippers then filed with a Dremel nail emilia.  Patient tolerated procedure well without complications.    An After Visit Summary was printed and given to the patient at discharge, including (if requested) any available informative/educational handouts regarding diagnosis, treatment, or medications. All questions were answered to patient/family satisfaction. Should symptoms fail to improve or worsen they agree to call or return to clinic or to go to the Emergency Department. Discussed the importance of following up with any needed screening  tests/labs/specialist appointments and any requested follow-up recommended by me today. Importance of maintaining follow-up discussed and patient accepts that missed appointments can delay diagnosis and potentially lead to worsening of conditions.    Return in about 9 weeks (around 6/18/2024) for Toenail Care., or sooner if acute issues arise.    I have reviewed the assessment and plan and verified the accuracy of it. No changes to assessment and plan since the information was documented. Bertin Fountain DPM 04/16/24     I have dictated this note utilizing Dragon Dictation.  Please note that portions of this note were completed with a voice recognition program.  Part of this note may be an electronic transcription/translation of spoken language to printed text using the Dragon Dictation System.        This document has been electronically signed by Bertin Fountain DPM on April 16, 2024 14:39 EDT

## 2024-05-08 ENCOUNTER — OFFICE VISIT (OUTPATIENT)
Dept: OTOLARYNGOLOGY | Facility: CLINIC | Age: 54
End: 2024-05-08
Payer: MEDICARE

## 2024-05-08 VITALS
WEIGHT: 103 LBS | DIASTOLIC BLOOD PRESSURE: 88 MMHG | HEART RATE: 80 BPM | SYSTOLIC BLOOD PRESSURE: 139 MMHG | HEIGHT: 57 IN | BODY MASS INDEX: 22.22 KG/M2 | TEMPERATURE: 97.2 F | OXYGEN SATURATION: 99 %

## 2024-05-08 DIAGNOSIS — J31.0 CHRONIC RHINITIS: ICD-10-CM

## 2024-05-08 DIAGNOSIS — R51.9 HEADACHE DISORDER: Primary | ICD-10-CM

## 2024-05-08 RX ORDER — CETIRIZINE HYDROCHLORIDE, PSEUDOEPHEDRINE HYDROCHLORIDE 5; 120 MG/1; MG/1
1 TABLET, FILM COATED, EXTENDED RELEASE ORAL 2 TIMES DAILY
Qty: 60 TABLET | Refills: 6 | Status: SHIPPED | OUTPATIENT
Start: 2024-05-08

## 2024-06-06 ENCOUNTER — TELEPHONE (OUTPATIENT)
Dept: PODIATRY | Facility: CLINIC | Age: 54
End: 2024-06-06
Payer: MEDICARE

## 2024-06-06 NOTE — TELEPHONE ENCOUNTER
Caller: MO   Relationship to Patient: SELF  Phone Number: [phone] (home)     Reason for Call:   PATIENT STATES BRIGHT BIG TOE APPEARS TO BE GETTING INFECTED IT IS RED SHE STATES SHE IS SOAKING IT WITH EPSOM SALT AND PUTTING MEDICATED NEOSPORIN ON IT ASKING IF THIS IS OK

## 2024-06-06 NOTE — TELEPHONE ENCOUNTER
Left message for patient to continue to soak the toe twice daily. If no improvement can call the office for a sooner appointment.

## 2024-06-19 ENCOUNTER — OFFICE VISIT (OUTPATIENT)
Dept: PODIATRY | Facility: CLINIC | Age: 54
End: 2024-06-19
Payer: MEDICARE

## 2024-06-19 VITALS
TEMPERATURE: 97.7 F | OXYGEN SATURATION: 98 % | SYSTOLIC BLOOD PRESSURE: 148 MMHG | DIASTOLIC BLOOD PRESSURE: 90 MMHG | WEIGHT: 103 LBS | BODY MASS INDEX: 22.22 KG/M2 | HEART RATE: 95 BPM | HEIGHT: 57 IN

## 2024-06-19 DIAGNOSIS — L60.0 ONYCHOCRYPTOSIS: ICD-10-CM

## 2024-06-19 DIAGNOSIS — M79.672 FOOT PAIN, BILATERAL: ICD-10-CM

## 2024-06-19 DIAGNOSIS — M79.671 FOOT PAIN, BILATERAL: ICD-10-CM

## 2024-06-19 DIAGNOSIS — B35.1 ONYCHOMYCOSIS: ICD-10-CM

## 2024-06-19 DIAGNOSIS — R26.2 DIFFICULTY WALKING: Primary | ICD-10-CM

## 2024-06-19 NOTE — PROGRESS NOTES
[facility] [place] - [facility]    Today's Date: 06/19/24    Patient Name: Carmita Devi  MRN: [id number]  CSN: [id number]  PCP: Joseluis, last PCP visit: 11/2/2023  Referring Provider: No ref. provider found    SUBJECTIVE     Chief Complaint   Patient presents with    Left Foot - Nail Problem    Right Foot - Nail Problem, Ingrown Toenail    Nail Problem    Ingrown Toenail     Great toe      HPI: Carmita Devi, a 54 y.o.female, comes to clinic.    New, Established, New Problem:  Established    Location:  Toenails    Duration:   Greater than five years    Onset:  Gradual    Nature:  sore with palpation.    Stable, worsening, improving:   Stable    Aggravating factors:  Pain with shoe gear and ambulation.    Previous Treatment:  debridement  _______________________________________________________________________    Medical changes:  none    Patient denies any fevers, chills, nausea, vomiting, shortness of breathe, nor any other constitutional signs nor symptoms.       I have reviewed/confirmed previously documented HPI with no changes.     Past Medical History:   Diagnosis Date    Allergic rhinitis     Bilateral foot pain     Chronic frontal sinusitis     Chronic maxillary sinusitis     Corns and callus     Deviated septum     Difficulty walking     Facial basal cell cancer     GERD (gastroesophageal reflux disease)     HBP (high blood pressure)     Headache     Hoarseness     Hypertension     Ingrowing toenail     Mentally challenged     Myofascial muscle pain     Osteoporosis     Otalgia     Tinea unguium      Past Surgical History:   Procedure Laterality Date    FOOT SURGERY  NOVEMBER 2022    SINUS SURGERY      SKIN CANCER EXCISION       Family History   Problem Relation Age of Onset    Skin cancer Father     Cancer Father         basil cell skin cancer     Social History     Socioeconomic History    Marital status: Single   Tobacco Use    Smoking status: Never     Passive exposure: Never    Smokeless  tobacco: Never   Vaping Use    Vaping status: Never Used   Substance and Sexual Activity    Alcohol use: Never    Drug use: Never    Sexual activity: Never     Allergies   Allergen Reactions    Alendronate Sodium Other (See Comments)     Severe heartburn      Sulfa Antibiotics Rash     Legs and face swelling      Current Outpatient Medications   Medication Sig Dispense Refill    baclofen (LIORESAL) 10 MG tablet baclofen 10 mg oral tablet take 1 tablet (10 mg) by oral route 3 times per day   Active      cetirizine-pseudoephedrine (ZyrTEC-D) 5-120 MG per 12 hr tablet Take 1 tablet by mouth 2 (Two) Times a Day. 60 tablet 6    Cholecalciferol 50 MCG (2000 UT) capsule Vitamin D3 2,000 unit oral capsule take 1 capsule by oral route daily   Active      denosumab (Prolia) 60 MG/ML solution prefilled syringe syringe 1 mL.      estradiol (ESTRACE) 0.1 MG/GM vaginal cream       estrogens, conjugated, (PREMARIN) 0.625 MG tablet       fluticasone (FLONASE) 50 MCG/ACT nasal spray fluticasone 50 mcg/actuation nasal spray,suspension spray 1 spray (50 mcg) in each nostril by intranasal route once daily   Active      ketoconazole (NIZORAL) 2 % cream Apply 1 Application topically to the appropriate area as directed 2 (Two) Times a Day. APPLY TO AFFECTED AREA      pantoprazole (PROTONIX) 40 MG EC tablet Take 1 tablet by mouth 2 (two) times a day.      Pyrithione Zinc 0.25 % liquid DermaZinc Spray 0.25 % topical spray,non-aerosol apply as directed by topical route once   Active      Sodium Chloride-Xylitol (XLEAR SINUS CARE SPRAY NA) Nasally      triamcinolone (KENALOG) 0.1 % cream       XYLITOL MT xylitol sinus spray nasal spray as directed   Suspended       No current facility-administered medications for this visit.     Review of Systems   Constitutional: Negative.    Skin:         Painful toenails.   Right great toenail lateral border is healing without complications.   All other systems reviewed and are negative.      OBJECTIVE      Vitals:    06/19/24 1429   BP: 148/90   Pulse: 95   Temp: 97.7 °F (36.5 °C)   SpO2: 98%           Patient seen in no apparent distress.      PHYSICAL EXAM:     Foot/Ankle Exam    GENERAL  Appearance:  appears stated age  Orientation:  AAOx3  Affect:  appropriate  Gait:  antalgic  Assistance:  cane use  Right shoe gear: casual shoe  Left shoe gear: casual shoe    VASCULAR     Right Foot Vascularity   Normal vascular exam    Dorsalis pedis:  2+  Posterior tibial:  2+  Skin temperature:  warm  Edema grading:  None  CFT:  < 3 seconds  Pedal hair growth:  Present  Varicosities:  none     Left Foot Vascularity   Normal vascular exam    Dorsalis pedis:  2+  Posterior tibial:  2+  Skin temperature:  warm  Edema grading:  None  CFT:  < 3 seconds  Pedal hair growth:  Present  Varicosities:  none     NEUROLOGIC     Right Foot Neurologic   Normal sensation    Light touch sensation: normal  Vibratory sensation: normal  Hot/Cold sensation: normal  Protective Sensation using [place]-Vanessa Monofilament:   Sites intact: 10  Sites tested: 10     Left Foot Neurologic   Normal sensation    Light touch sensation: normal  Vibratory sensation: normal  Hot/Cold sensation:  normal  Protective Sensation using [place]-Vanessa Monofilament:   Sites intact: 10  Sites tested: 10    MUSCULOSKELETAL     Right Foot Musculoskeletal   Arch:  Pes cavus     Left Foot Musculoskeletal   Arch:  Pes cavus    MUSCLE STRENGTH     Right Foot Muscle Strength   Foot dorsiflexion:  4-  Foot plantar flexion:  4-  Foot inversion:  4-  Foot eversion:  4-     Left Foot Muscle Strength   Foot dorsiflexion:  4-  Foot plantar flexion:  4-  Foot inversion:  4-  Foot eversion:  4-    RANGE OF MOTION     Right Foot Range of Motion   Foot and ankle ROM within normal limits       Left Foot Range of Motion   Foot and ankle ROM within normal limits      DERMATOLOGIC      Right Foot Dermatologic   Skin  Right foot skin is intact.   Nails  2.  Positive for elongated,  onychomycosis, abnormal thickness, subungual debris and ingrown toenail.  3.  Positive for elongated, onychomycosis, abnormal thickness, subungual debris and ingrown toenail.  4.  Positive for elongated, onychomycosis, abnormal thickness, subungual debris and ingrown toenail.  5.  Positive for elongated, onychomycosis, abnormal thickness, subungual debris and ingrown toenail.     Left Foot Dermatologic   Skin  Left foot skin is intact.   Nails  1.  Positive for elongated, onychomycosis, abnormal thickness, subungual debris and ingrown toenail.  2.  Positive for elongated, onychomycosis, abnormal thickness, subungual debris and ingrown toenail.  3.  Positive for elongated, onychomycosis, abnormal thickness, subungual debris and ingrown toenail.  4.  Positive for elongated, onychomycosis, abnormally thick, subungual debris and ingrown toenail.  5.  Positive for elongated, onychomycosis, abnormally thick, subungual debris and ingrown toenail.    I have reexamined the patient the results are consistent with the previously documented exam.    ASSESSMENT/PLAN     Diagnoses and all orders for this visit:    1. Difficulty walking (Primary)    2. Foot pain, bilateral    3. Onychomycosis    4. Onychocryptosis    Comprehensive lower extremity examination and evaluation was performed.    Discussed findings and treatment plan including risks, benefits, and treatment options with patient in detail. Patient agreed with treatment plan.    Toenails 2, 3, 4, 5 on Right and 1, 2, 3, 4, 5 on Left were debrided with nail nippers then filed with a Eleazarmel nail emilia.  Patient tolerated procedure well without complications.    An After Visit Summary was printed and given to the patient at discharge, including (if requested) any available informative/educational handouts regarding diagnosis, treatment, or medications. All questions were answered to patient/family satisfaction. Should symptoms fail to improve or worsen they agree to call or  return to clinic or to go to the Emergency Department. Discussed the importance of following up with any needed screening tests/labs/specialist appointments and any requested follow-up recommended by me today. Importance of maintaining follow-up discussed and patient accepts that missed appointments can delay diagnosis and potentially lead to worsening of conditions.    Return in about 9 weeks (around 8/21/2024) for Toenail Care., or sooner if acute issues arise.    I have reviewed the assessment and plan and verified the accuracy of it. No changes to assessment and plan since the information was documented. Bertin Fountain DPM 06/19/24     I have dictated this note utilizing Dragon Dictation.  Please note that portions of this note were completed with a voice recognition program.  Part of this note may be an electronic transcription/translation of spoken language to printed text using the Dragon Dictation System.      This document has been electronically signed by Bertin Fountain DPM on June 19, 2024 14:41 EDT

## 2024-07-01 ENCOUNTER — TELEPHONE (OUTPATIENT)
Dept: PODIATRY | Facility: CLINIC | Age: 54
End: 2024-07-01

## 2024-07-01 NOTE — TELEPHONE ENCOUNTER
Caller: Carmita Devi    Relationship to patient: Self    Best call back number: [phone]      Type of visit: FOLLOW UP    Requested date: JULY 30, 2024    If rescheduling, when is the original appointment: AUGUST 6, 2024    Additional notes:  PT IS SCHEDULED FOR A PROCEDURE ON 7/16  AND WOULD LIKE A 2WK FOLLOW UP ( WHICH WOULD BE 7/30)   PT WOULD LIKE TO SEE IF SHE CAN COME IN ON JULY 30 OR IF AUG 6, 2024 IS OKAY.    PLEASE CONTACT PT AND ADVISE

## 2024-07-09 ENCOUNTER — HOSPITAL ENCOUNTER (OUTPATIENT)
Dept: MAMMOGRAPHY | Facility: HOSPITAL | Age: 54
Discharge: HOME | End: 2024-07-09
Admitting: INTERNAL MEDICINE
Payer: MEDICARE

## 2024-07-09 DIAGNOSIS — Z12.31 ENCOUNTER FOR SCREENING MAMMOGRAM FOR MALIGNANT NEOPLASM OF BREAST: ICD-10-CM

## 2024-07-09 PROCEDURE — 77067 SCR MAMMO BI INCL CAD: CPT

## 2024-07-09 PROCEDURE — 77063 BREAST TOMOSYNTHESIS BI: CPT

## 2024-07-16 ENCOUNTER — OFFICE VISIT (OUTPATIENT)
Dept: PODIATRY | Facility: CLINIC | Age: 54
End: 2024-07-16
Payer: MEDICARE

## 2024-07-16 VITALS
TEMPERATURE: 98 F | BODY MASS INDEX: 22.22 KG/M2 | HEART RATE: 113 BPM | SYSTOLIC BLOOD PRESSURE: 159 MMHG | DIASTOLIC BLOOD PRESSURE: 108 MMHG | HEIGHT: 57 IN | WEIGHT: 103 LBS | OXYGEN SATURATION: 99 %

## 2024-07-16 DIAGNOSIS — L03.031 PARONYCHIA OF TOE OF RIGHT FOOT: ICD-10-CM

## 2024-07-16 DIAGNOSIS — M79.671 FOOT PAIN, RIGHT: ICD-10-CM

## 2024-07-16 DIAGNOSIS — R26.2 DIFFICULTY WALKING: Primary | ICD-10-CM

## 2024-07-16 DIAGNOSIS — L60.0 ONYCHOCRYPTOSIS: ICD-10-CM

## 2024-07-16 NOTE — PROGRESS NOTES
[facility]    Today's Date: 07/16/24    Patient Name: Carmita Devi  MRN: [id number]  CSN: [id number]  PCP: Joseluis, last PCP visit: 11/2/2023  Referring Provider: No ref. provider found    SUBJECTIVE     Chief Complaint   Patient presents with    Right Foot - Ingrown Toenail     Here for P&A procedure     HPI: Carmita Devi, a 54 y.o.female, comes to clinic.    New, Established, New Problem: New problem  Location: Medial and lateral borders of the right great toe  Duration: Greater than 1 week  Onset: Insidious  Nature: Sore ingrown toenails  Aggravating factors:  Patient relates pain is aggravated by shoe gear and ambulation.    Previous Treatment: Debridement    Patient denies any fevers, chills, nausea, vomiting, shortness of breathe, nor any other constitutional signs nor symptoms.  Past Medical History:   Diagnosis Date    Allergic rhinitis     Bilateral foot pain     Chronic frontal sinusitis     Chronic maxillary sinusitis     Corns and callus     Deviated septum     Difficulty walking     Facial basal cell cancer     GERD (gastroesophageal reflux disease)     HBP (high blood pressure)     Headache     Hoarseness     Hypertension     Ingrowing toenail     Mentally challenged     Myofascial muscle pain     Osteoporosis     Otalgia     Tinea unguium      Past Surgical History:   Procedure Laterality Date    FOOT SURGERY  NOVEMBER 2022    SINUS SURGERY      SKIN CANCER EXCISION       Family History   Problem Relation Age of Onset    Skin cancer Father     Cancer Father         basil cell skin cancer     Social History     Socioeconomic History    Marital status: Single   Tobacco Use    Smoking status: Never     Passive exposure: Never    Smokeless tobacco: Never   Vaping Use    Vaping status: Never Used   Substance and Sexual Activity    Alcohol use: Never    Drug use: Never    Sexual activity: Never     Allergies   Allergen Reactions    Alendronate Sodium Other (See Comments)      Severe heartburn      Sulfa Antibiotics Rash     Legs and face swelling      Current Outpatient Medications   Medication Sig Dispense Refill    baclofen (LIORESAL) 10 MG tablet baclofen 10 mg oral tablet take 1 tablet (10 mg) by oral route 3 times per day   Active      cetirizine-pseudoephedrine (ZyrTEC-D) 5-120 MG per 12 hr tablet Take 1 tablet by mouth 2 (Two) Times a Day. 60 tablet 6    Cholecalciferol 50 MCG (2000 UT) capsule Vitamin D3 2,000 unit oral capsule take 1 capsule by oral route daily   Active      denosumab (Prolia) 60 MG/ML solution prefilled syringe syringe 1 mL.      estradiol (ESTRACE) 0.1 MG/GM vaginal cream       estrogens, conjugated, (PREMARIN) 0.625 MG tablet       fluticasone (FLONASE) 50 MCG/ACT nasal spray fluticasone 50 mcg/actuation nasal spray,suspension spray 1 spray (50 mcg) in each nostril by intranasal route once daily   Active      ketoconazole (NIZORAL) 2 % cream Apply 1 Application topically to the appropriate area as directed 2 (Two) Times a Day. APPLY TO AFFECTED AREA      pantoprazole (PROTONIX) 40 MG EC tablet Take 1 tablet by mouth 2 (two) times a day.      Pyrithione Zinc 0.25 % liquid DermaZinc Spray 0.25 % topical spray,non-aerosol apply as directed by topical route once   Active      Sodium Chloride-Xylitol (XLEAR SINUS CARE SPRAY NA) Nasally      triamcinolone (KENALOG) 0.1 % cream       XYLITOL MT xylitol sinus spray nasal spray as directed   Suspended       No current facility-administered medications for this visit.     Review of Systems   Constitutional: Negative.    Skin:         Right great toenail borders   All other systems reviewed and are negative.      OBJECTIVE     Vitals:    07/16/24 1343   BP: (!) 159/108   Pulse:    Temp:    SpO2:            Patient seen in no apparent distress.      PHYSICAL EXAM:     Foot/Ankle Exam    GENERAL  Appearance:  appears stated age  Orientation:  AAOx3  Affect:  appropriate  Gait:  antalgic  Assistance:  cane use  Right shoe  gear: casual shoe  Left shoe gear: casual shoe    VASCULAR     Right Foot Vascularity   Normal vascular exam    Dorsalis pedis:  2+  Posterior tibial:  2+  Skin temperature:  warm  Edema grading:  None  CFT:  < 3 seconds  Pedal hair growth:  Present  Varicosities:  none     Left Foot Vascularity   Normal vascular exam    Dorsalis pedis:  2+  Posterior tibial:  2+  Skin temperature:  warm  Edema grading:  None  CFT:  < 3 seconds  Pedal hair growth:  Present  Varicosities:  none     NEUROLOGIC     Right Foot Neurologic   Normal sensation    Light touch sensation: normal  Vibratory sensation: normal  Hot/Cold sensation: normal  Protective Sensation using [place]-Vanessa Monofilament:   Sites intact: 10  Sites tested: 10     Left Foot Neurologic   Normal sensation    Light touch sensation: normal  Vibratory sensation: normal  Hot/Cold sensation:  normal  Protective Sensation using [place]-Vanessa Monofilament:   Sites intact: 10  Sites tested: 10    MUSCULOSKELETAL     Right Foot Musculoskeletal   Arch:  Pes cavus     Left Foot Musculoskeletal   Arch:  Pes cavus    MUSCLE STRENGTH     Right Foot Muscle Strength   Foot dorsiflexion:  4-  Foot plantar flexion:  4-  Foot inversion:  4-  Foot eversion:  4-     Left Foot Muscle Strength   Foot dorsiflexion:  4-  Foot plantar flexion:  4-  Foot inversion:  4-  Foot eversion:  4-    RANGE OF MOTION     Right Foot Range of Motion   Foot and ankle ROM within normal limits       Left Foot Range of Motion   Foot and ankle ROM within normal limits      DERMATOLOGIC      Right Foot Dermatologic   Skin  Right foot skin is intact.   Nails  1.  Positive for ingrown toenail and paronychia. (Medial and lateral toenail borders)     Left Foot Dermatologic   Skin  Left foot skin is intact.     I have reexamined the patient the results are consistent with the previously documented exam.    ASSESSMENT/PLAN     Diagnoses and all orders for this visit:    1. Difficulty walking (Primary)    2.  Foot pain, right    3. Onychocryptosis    4. Paronychia of toe of right foot    Comprehensive lower extremity examination and evaluation was performed.    Discussed findings and treatment plan including risks, benefits, and treatment options with patient in detail. Patient agreed with treatment plan.    Phenol and Alcohol Chemical Matrixectomy Procedure - This procedure is indicated for onychocryptosis of the right first medial and lateral nail border(s). Indications, risks and benefits and alternative treatments have been discussed with this patient who has agreed to this procedure. The area was sterilely prepped with a povidone-iodine solution. The affected area was locally anesthetized with 3 ml, of lidocaine 1%. The offending nail plate was completely excised.  Next 3 applications of 89% phenol were applied to the matrix area x 30 seconds followed by irrigation with copious amounts of isopropyl alcohol.  A sterile dressing was applied. The patient tolerated the procedure well.     Patient was given post procedure care instructions.  The patient states understanding and agreement with this plan.    Return in about 2 weeks (around 7/30/2024) for Post-Procedure., or sooner if acute issues arise.    I have reviewed the assessment and plan and verified the accuracy of it. No changes to assessment and plan since the information was documented. Bertin Fountain DPM 07/16/24     I have dictated this note utilizing Dragon Dictation.  Please note that portions of this note were completed with a voice recognition program.  Part of this note may be an electronic transcription/translation of spoken language to printed text using the Dragon Dictation System.      This document has been electronically signed by Bertin Fountain DPM on July 16, 2024 13:58 EDT

## 2024-07-30 ENCOUNTER — OFFICE VISIT (OUTPATIENT)
Dept: PODIATRY | Facility: CLINIC | Age: 54
End: 2024-07-30
Payer: MEDICARE

## 2024-07-30 VITALS
DIASTOLIC BLOOD PRESSURE: 87 MMHG | TEMPERATURE: 98 F | SYSTOLIC BLOOD PRESSURE: 147 MMHG | HEIGHT: 57 IN | HEART RATE: 110 BPM | OXYGEN SATURATION: 97 % | BODY MASS INDEX: 22.22 KG/M2 | WEIGHT: 103 LBS

## 2024-07-30 DIAGNOSIS — L03.031 PARONYCHIA OF TOE OF RIGHT FOOT: ICD-10-CM

## 2024-07-30 DIAGNOSIS — L60.0 ONYCHOCRYPTOSIS: ICD-10-CM

## 2024-07-30 DIAGNOSIS — M79.671 FOOT PAIN, RIGHT: ICD-10-CM

## 2024-07-30 DIAGNOSIS — R26.2 DIFFICULTY WALKING: Primary | ICD-10-CM

## 2024-07-30 NOTE — PROGRESS NOTES
[facility]    Today's Date: 07/30/24    Patient Name: Carmita Devi  MRN: [id number]  CSN: [id number]  PCP: Joseluis, last PCP visit: 11/2/2023  Referring Provider: No ref. provider found    SUBJECTIVE     Chief Complaint   Patient presents with    Right Foot - Follow-up, Ingrown Toenail     P&A follow up      HPI: Carmita Devi, a 54 y.o.female, comes to clinic.    New, Established, New Problem: Established  Location: Medial and lateral borders of the right great toe  Duration: Greater than 1 week  Onset: Insidious  Nature: Sore ingrown toenails  Aggravating factors:  Patient relates pain is aggravated by shoe gear and ambulation.    Previous Treatment: Debridement, chemical matrixectomy to affected nail borders    Patient denies any fevers, chills, nausea, vomiting, shortness of breathe, nor any other constitutional signs nor symptoms.  Past Medical History:   Diagnosis Date    Allergic rhinitis     Bilateral foot pain     Chronic frontal sinusitis     Chronic maxillary sinusitis     Corns and callus     Deviated septum     Difficulty walking     Facial basal cell cancer     GERD (gastroesophageal reflux disease)     HBP (high blood pressure)     Headache     Hoarseness     Hypertension     Ingrowing toenail     Mentally challenged     Myofascial muscle pain     Osteoporosis     Otalgia     Tinea unguium      Past Surgical History:   Procedure Laterality Date    FOOT SURGERY  NOVEMBER 2022    SINUS SURGERY      SKIN CANCER EXCISION       Family History   Problem Relation Age of Onset    Skin cancer Father     Cancer Father         basil cell skin cancer     Social History     Socioeconomic History    Marital status: Single   Tobacco Use    Smoking status: Never     Passive exposure: Never    Smokeless tobacco: Never   Vaping Use    Vaping status: Never Used   Substance and Sexual Activity    Alcohol use: Never    Drug use: Never    Sexual activity: Never     Allergies   Allergen  Reactions    Alendronate Sodium Other (See Comments)     Severe heartburn      Sulfa Antibiotics Rash     Legs and face swelling      Current Outpatient Medications   Medication Sig Dispense Refill    baclofen (LIORESAL) 10 MG tablet baclofen 10 mg oral tablet take 1 tablet (10 mg) by oral route 3 times per day   Active      cetirizine-pseudoephedrine (ZyrTEC-D) 5-120 MG per 12 hr tablet Take 1 tablet by mouth 2 (Two) Times a Day. 60 tablet 6    Cholecalciferol 50 MCG (2000 UT) capsule Vitamin D3 2,000 unit oral capsule take 1 capsule by oral route daily   Active      denosumab (Prolia) 60 MG/ML solution prefilled syringe syringe 1 mL.      estradiol (ESTRACE) 0.1 MG/GM vaginal cream       estrogens, conjugated, (PREMARIN) 0.625 MG tablet       fluticasone (FLONASE) 50 MCG/ACT nasal spray fluticasone 50 mcg/actuation nasal spray,suspension spray 1 spray (50 mcg) in each nostril by intranasal route once daily   Active      ketoconazole (NIZORAL) 2 % cream Apply 1 Application topically to the appropriate area as directed 2 (Two) Times a Day. APPLY TO AFFECTED AREA      pantoprazole (PROTONIX) 40 MG EC tablet Take 1 tablet by mouth 2 (two) times a day.      Pyrithione Zinc 0.25 % liquid DermaZinc Spray 0.25 % topical spray,non-aerosol apply as directed by topical route once   Active      Sodium Chloride-Xylitol (XLEAR SINUS CARE SPRAY NA) Nasally      triamcinolone (KENALOG) 0.1 % cream       XYLITOL MT xylitol sinus spray nasal spray as directed   Suspended       No current facility-administered medications for this visit.     Review of Systems   Constitutional: Negative.    Skin:         Follow-up for right great toenail borders   All other systems reviewed and are negative.      OBJECTIVE     Vitals:    07/30/24 1323   BP: 147/87   Pulse: 110   Temp: 98 °F (36.7 °C)   SpO2: 97%           Patient seen in no apparent distress.      PHYSICAL EXAM:     Foot/Ankle Exam    GENERAL  Appearance:  appears stated  age  Orientation:  AAOx3  Affect:  appropriate  Gait:  antalgic  Assistance:  cane use  Right shoe gear: casual shoe  Left shoe gear: casual shoe    VASCULAR     Right Foot Vascularity   Normal vascular exam    Dorsalis pedis:  2+  Posterior tibial:  2+  Skin temperature:  warm  Edema grading:  None  CFT:  < 3 seconds  Pedal hair growth:  Present  Varicosities:  none     Left Foot Vascularity   Normal vascular exam    Dorsalis pedis:  2+  Posterior tibial:  2+  Skin temperature:  warm  Edema grading:  None  CFT:  < 3 seconds  Pedal hair growth:  Present  Varicosities:  none     NEUROLOGIC     Right Foot Neurologic   Normal sensation    Light touch sensation: normal  Vibratory sensation: normal  Hot/Cold sensation: normal  Protective Sensation using [place]-Vanessa Monofilament:   Sites intact: 10  Sites tested: 10     Left Foot Neurologic   Normal sensation    Light touch sensation: normal  Vibratory sensation: normal  Hot/Cold sensation:  normal  Protective Sensation using [place]-Vanessa Monofilament:   Sites intact: 10  Sites tested: 10    MUSCULOSKELETAL     Right Foot Musculoskeletal   Arch:  Pes cavus     Left Foot Musculoskeletal   Arch:  Pes cavus    MUSCLE STRENGTH     Right Foot Muscle Strength   Foot dorsiflexion:  4-  Foot plantar flexion:  4-  Foot inversion:  4-  Foot eversion:  4-     Left Foot Muscle Strength   Foot dorsiflexion:  4-  Foot plantar flexion:  4-  Foot inversion:  4-  Foot eversion:  4-    RANGE OF MOTION     Right Foot Range of Motion   Foot and ankle ROM within normal limits       Left Foot Range of Motion   Foot and ankle ROM within normal limits      DERMATOLOGIC      Right Foot Dermatologic   Skin  Right foot skin is intact.   Nails  1.  Negative for ingrown toenail and paronychia. (Medial and lateral toenail borders; healing without complications.)     Left Foot Dermatologic   Skin  Left foot skin is intact.     I have reexamined the patient the results are consistent with the  previously documented exam.    ASSESSMENT/PLAN     Diagnoses and all orders for this visit:    1. Difficulty walking (Primary)    2. Foot pain, right    3. Onychocryptosis    4. Paronychia of toe of right foot    Comprehensive lower extremity examination and evaluation was performed.    Discussed findings and treatment plan including risks, benefits, and treatment options with patient in detail. Patient agreed with treatment plan.    Patient is to monitor for recurrence and any new symptoms and to contact Dr. Fountain's office for a follow-up appointment.      The patient states understanding and agreement with this plan.    Return in about 22 days (around 8/21/2024) for Toenail Care, Already made., or sooner if acute issues arise.    I have reviewed the assessment and plan and verified the accuracy of it. No changes to assessment and plan since the information was documented. Bertin Fountain DPM 07/30/24     I have dictated this note utilizing Dragon Dictation.  Please note that portions of this note were completed with a voice recognition program.  Part of this note may be an electronic transcription/translation of spoken language to printed text using the Dragon Dictation System.      This document has been electronically signed by Bertin Fountain DPM on July 30, 2024 13:32 EDT

## 2024-08-21 ENCOUNTER — OFFICE VISIT (OUTPATIENT)
Dept: PODIATRY | Facility: CLINIC | Age: 54
End: 2024-08-21
Payer: MEDICARE

## 2024-08-21 VITALS
WEIGHT: 104 LBS | OXYGEN SATURATION: 99 % | TEMPERATURE: 97.8 F | HEIGHT: 57 IN | HEART RATE: 98 BPM | BODY MASS INDEX: 22.44 KG/M2 | SYSTOLIC BLOOD PRESSURE: 142 MMHG | DIASTOLIC BLOOD PRESSURE: 83 MMHG

## 2024-08-21 DIAGNOSIS — M79.671 FOOT PAIN, BILATERAL: ICD-10-CM

## 2024-08-21 DIAGNOSIS — R26.2 DIFFICULTY WALKING: Primary | ICD-10-CM

## 2024-08-21 DIAGNOSIS — L60.0 ONYCHOCRYPTOSIS: ICD-10-CM

## 2024-08-21 DIAGNOSIS — M79.672 FOOT PAIN, BILATERAL: ICD-10-CM

## 2024-08-21 DIAGNOSIS — B35.1 ONYCHOMYCOSIS: ICD-10-CM

## 2024-08-21 NOTE — PROGRESS NOTES
[facility]    Today's Date: 08/21/24    Patient Name: Carmita Devi  MRN: [id number]  CSN: [id number]  PCP: Joseluis, last PCP visit: 8 July 2024  Referring Provider: No ref. provider found    SUBJECTIVE     Chief Complaint   Patient presents with    Left Foot - Follow-up, Nail Problem    Right Foot - Follow-up, Nail Problem     HPI: Carmita Devi, a 54 y.o.female, comes to clinic.    New, Established, New Problem:  Established    Location:  Toenails    Duration:   Greater than five years    Onset:  Gradual    Nature:  sore with palpation.    Stable, worsening, improving:   Stable    Aggravating factors:  Pain with shoe gear and ambulation.    Previous Treatment:  debridement  _______________________________________________________________________    Medical changes:  no changes    Patient denies any fevers, chills, nausea, vomiting, shortness of breathe, nor any other constitutional signs nor symptoms.       I have reviewed/confirmed previously documented HPI with no changes.     Past Medical History:   Diagnosis Date    Allergic rhinitis     Bilateral foot pain     Chronic frontal sinusitis     Chronic maxillary sinusitis     Corns and callus     Deviated septum     Difficulty walking     Facial basal cell cancer     GERD (gastroesophageal reflux disease)     HBP (high blood pressure)     Headache     Hoarseness     Hypertension     Ingrowing toenail     Mentally challenged     Myofascial muscle pain     Osteoporosis     Otalgia     Tinea unguium      Past Surgical History:   Procedure Laterality Date    FOOT SURGERY  NOVEMBER 2022    SINUS SURGERY      SKIN CANCER EXCISION       Family History   Problem Relation Age of Onset    Skin cancer Father     Cancer Father         basil cell skin cancer     Social History     Socioeconomic History    Marital status: Single   Tobacco Use    Smoking status: Never     Passive exposure: Never    Smokeless tobacco: Never   Vaping Use    Vaping  status: Never Used   Substance and Sexual Activity    Alcohol use: Never    Drug use: Never    Sexual activity: Never     Allergies   Allergen Reactions    Alendronate Sodium Other (See Comments)     Severe heartburn      Sulfa Antibiotics Rash     Legs and face swelling      Current Outpatient Medications   Medication Sig Dispense Refill    cetirizine-pseudoephedrine (ZyrTEC-D) 5-120 MG per 12 hr tablet Take 1 tablet by mouth 2 (Two) Times a Day. 60 tablet 6    Cholecalciferol 50 MCG (2000 UT) capsule Vitamin D3 2,000 unit oral capsule take 1 capsule by oral route daily   Active      denosumab (Prolia) 60 MG/ML solution prefilled syringe syringe 1 mL.      estradiol (ESTRACE) 0.1 MG/GM vaginal cream       estrogens, conjugated, (PREMARIN) 0.625 MG tablet       fluticasone (FLONASE) 50 MCG/ACT nasal spray fluticasone 50 mcg/actuation nasal spray,suspension spray 1 spray (50 mcg) in each nostril by intranasal route once daily   Active      ketoconazole (NIZORAL) 2 % cream Apply 1 Application topically to the appropriate area as directed 2 (Two) Times a Day. APPLY TO AFFECTED AREA      pantoprazole (PROTONIX) 40 MG EC tablet Take 1 tablet by mouth 2 (two) times a day.      Pyrithione Zinc 0.25 % liquid DermaZinc Spray 0.25 % topical spray,non-aerosol apply as directed by topical route once   Active      Sodium Chloride-Xylitol (XLEAR SINUS CARE SPRAY NA) Nasally      triamcinolone (KENALOG) 0.1 % cream       XYLITOL MT xylitol sinus spray nasal spray as directed   Suspended      baclofen (LIORESAL) 10 MG tablet baclofen 10 mg oral tablet take 1 tablet (10 mg) by oral route 3 times per day   Active (Patient not taking: Reported on 8/21/2024)       No current facility-administered medications for this visit.     Review of Systems   Constitutional: Negative.    Skin:         Painful toenails.   Right great toenail lateral border is healing without complications.   All other systems reviewed and are  negative.      OBJECTIVE     Vitals:    08/21/24 1327   BP: 142/83   Pulse: 98   Temp: 97.8 °F (36.6 °C)   SpO2: 99%           Patient seen in no apparent distress.      PHYSICAL EXAM:     Foot/Ankle Exam    GENERAL  Appearance:  appears stated age  Orientation:  AAOx3  Affect:  appropriate  Gait:  antalgic  Assistance:  cane use  Right shoe gear: casual shoe  Left shoe gear: casual shoe    VASCULAR     Right Foot Vascularity   Normal vascular exam    Dorsalis pedis:  2+  Posterior tibial:  2+  Skin temperature:  warm  Edema grading:  None  CFT:  < 3 seconds  Pedal hair growth:  Present  Varicosities:  none     Left Foot Vascularity   Normal vascular exam    Dorsalis pedis:  2+  Posterior tibial:  2+  Skin temperature:  warm  Edema grading:  None  CFT:  < 3 seconds  Pedal hair growth:  Present  Varicosities:  none     NEUROLOGIC     Right Foot Neurologic   Normal sensation    Light touch sensation: normal  Vibratory sensation: normal  Hot/Cold sensation: normal  Protective Sensation using [place]-Vanessa Monofilament:   Sites intact: 10  Sites tested: 10     Left Foot Neurologic   Normal sensation    Light touch sensation: normal  Vibratory sensation: normal  Hot/Cold sensation:  normal  Protective Sensation using [place]-Vanessa Monofilament:   Sites intact: 10  Sites tested: 10    MUSCULOSKELETAL     Right Foot Musculoskeletal   Arch:  Pes cavus     Left Foot Musculoskeletal   Arch:  Pes cavus    MUSCLE STRENGTH     Right Foot Muscle Strength   Foot dorsiflexion:  4-  Foot plantar flexion:  4-  Foot inversion:  4-  Foot eversion:  4-     Left Foot Muscle Strength   Foot dorsiflexion:  4-  Foot plantar flexion:  4-  Foot inversion:  4-  Foot eversion:  4-    RANGE OF MOTION     Right Foot Range of Motion   Foot and ankle ROM within normal limits       Left Foot Range of Motion   Foot and ankle ROM within normal limits      DERMATOLOGIC      Right Foot Dermatologic   Skin  Right foot skin is intact.   Nails  2.   Positive for elongated, onychomycosis, abnormal thickness, subungual debris and ingrown toenail.  3.  Positive for elongated, onychomycosis, abnormal thickness, subungual debris and ingrown toenail.  4.  Positive for elongated, onychomycosis, abnormal thickness, subungual debris and ingrown toenail.  5.  Positive for elongated, onychomycosis, abnormal thickness, subungual debris and ingrown toenail.     Left Foot Dermatologic   Skin  Left foot skin is intact.   Nails  1.  Positive for elongated, onychomycosis, abnormal thickness, subungual debris and ingrown toenail.  2.  Positive for elongated, onychomycosis, abnormal thickness, subungual debris and ingrown toenail.  3.  Positive for elongated, onychomycosis, abnormal thickness, subungual debris and ingrown toenail.  4.  Positive for elongated, onychomycosis, abnormally thick, subungual debris and ingrown toenail.  5.  Positive for elongated, onychomycosis, abnormally thick, subungual debris and ingrown toenail.    I have reexamined the patient the results are consistent with the previously documented exam.    ASSESSMENT/PLAN     Diagnoses and all orders for this visit:    1. Difficulty walking (Primary)    2. Onychocryptosis    3. Foot pain, bilateral    4. Onychomycosis    Comprehensive lower extremity examination and evaluation was performed.    Discussed findings and treatment plan including risks, benefits, and treatment options with patient in detail. Patient agreed with treatment plan.    Toenails 2, 3, 4, 5 on Right and 1, 2, 3, 4, 5 on Left were debrided with nail nippers then filed with a Dremel nail emilia.  Patient tolerated procedure well without complications.    An After Visit Summary was printed and given to the patient at discharge, including (if requested) any available informative/educational handouts regarding diagnosis, treatment, or medications. All questions were answered to patient/family satisfaction. Should symptoms fail to improve or  worsen they agree to call or return to clinic or to go to the Emergency Department. Discussed the importance of following up with any needed screening tests/labs/specialist appointments and any requested follow-up recommended by me today. Importance of maintaining follow-up discussed and patient accepts that missed appointments can delay diagnosis and potentially lead to worsening of conditions.    Return in about 9 weeks (around 10/23/2024) for Toenail Care., or sooner if acute issues arise.    I have reviewed the assessment and plan and verified the accuracy of it. No changes to assessment and plan since the information was documented. Bertin Fountain DPM 08/21/24     I have dictated this note utilizing Dragon Dictation.  Please note that portions of this note were completed with a voice recognition program.  Part of this note may be an electronic transcription/translation of spoken language to printed text using the Dragon Dictation System.      This document has been electronically signed by Bertin Fountain DPM on August 21, 2024 13:35 EDT

## 2024-10-30 ENCOUNTER — OFFICE VISIT (OUTPATIENT)
Dept: PODIATRY | Facility: CLINIC | Age: 54
End: 2024-10-30
Payer: MEDICARE

## 2024-10-30 VITALS
WEIGHT: 104 LBS | HEART RATE: 93 BPM | OXYGEN SATURATION: 98 % | DIASTOLIC BLOOD PRESSURE: 91 MMHG | BODY MASS INDEX: 22.44 KG/M2 | SYSTOLIC BLOOD PRESSURE: 159 MMHG | HEIGHT: 57 IN

## 2024-10-30 DIAGNOSIS — L60.0 ONYCHOCRYPTOSIS: ICD-10-CM

## 2024-10-30 DIAGNOSIS — B35.1 ONYCHOMYCOSIS: ICD-10-CM

## 2024-10-30 DIAGNOSIS — R26.2 DIFFICULTY WALKING: Primary | ICD-10-CM

## 2024-10-30 DIAGNOSIS — M79.672 FOOT PAIN, BILATERAL: ICD-10-CM

## 2024-10-30 DIAGNOSIS — M79.671 FOOT PAIN, BILATERAL: ICD-10-CM

## 2024-10-30 PROCEDURE — 3080F DIAST BP >= 90 MM HG: CPT | Performed by: PODIATRIST

## 2024-10-30 PROCEDURE — 1160F RVW MEDS BY RX/DR IN RCRD: CPT | Performed by: PODIATRIST

## 2024-10-30 PROCEDURE — 3077F SYST BP >= 140 MM HG: CPT | Performed by: PODIATRIST

## 2024-10-30 PROCEDURE — 1159F MED LIST DOCD IN RCRD: CPT | Performed by: PODIATRIST

## 2024-10-30 PROCEDURE — 11721 DEBRIDE NAIL 6 OR MORE: CPT | Performed by: PODIATRIST

## 2024-10-30 NOTE — PROGRESS NOTES
[facility]    Today's Date: 10/30/24    Patient Name: Carmita Devi  MRN: [id number]  CSN: [id number]  PCP: Joseluis, last PCP visit: 8 July 2024  Referring Provider: No ref. provider found    SUBJECTIVE     Chief Complaint   Patient presents with    Left Foot - Follow-up, Nail Problem    Right Foot - Follow-up, Nail Problem     HPI: Carmita Devi, a 54 y.o.female, comes to clinic.    New, Established, New Problem:  Established    Location:  Toenails    Duration:   Greater than five years    Onset:  Gradual    Nature:  sore with palpation.    Stable, worsening, improving:   Stable    Aggravating factors:  Pain with shoe gear and ambulation.    Previous Treatment:  debridement  _______________________________________________________________________    Medical changes:  none    Patient denies any fevers, chills, nausea, vomiting, shortness of breathe, nor any other constitutional signs nor symptoms.       I have reviewed/confirmed previously documented HPI with no changes.     Past Medical History:   Diagnosis Date    Allergic rhinitis     Bilateral foot pain     Chronic frontal sinusitis     Chronic maxillary sinusitis     Corns and callus     Deviated septum     Difficulty walking     Facial basal cell cancer     GERD (gastroesophageal reflux disease)     HBP (high blood pressure)     Headache     Hoarseness     Hypertension     Ingrowing toenail     Mentally challenged     Myofascial muscle pain     Osteoporosis     Otalgia     Tinea unguium      Past Surgical History:   Procedure Laterality Date    FOOT SURGERY  NOVEMBER 2022    SINUS SURGERY      SKIN CANCER EXCISION       Family History   Problem Relation Age of Onset    Skin cancer Father     Cancer Father         basil cell skin cancer     Social History     Socioeconomic History    Marital status: Single   Tobacco Use    Smoking status: Never     Passive exposure: Never    Smokeless tobacco: Never   Vaping Use    Vaping status:  Never Used   Substance and Sexual Activity    Alcohol use: Never    Drug use: Never    Sexual activity: Never     Allergies   Allergen Reactions    Alendronate Sodium Other (See Comments)     Severe heartburn      Sulfa Antibiotics Rash     Legs and face swelling      Current Outpatient Medications   Medication Sig Dispense Refill    baclofen (LIORESAL) 10 MG tablet       cetirizine-pseudoephedrine (ZyrTEC-D) 5-120 MG per 12 hr tablet Take 1 tablet by mouth 2 (Two) Times a Day. 60 tablet 6    Cholecalciferol 50 MCG (2000 UT) capsule Vitamin D3 2,000 unit oral capsule take 1 capsule by oral route daily   Active      denosumab (Prolia) 60 MG/ML solution prefilled syringe syringe 1 mL.      estradiol (ESTRACE) 0.1 MG/GM vaginal cream       estrogens, conjugated, (PREMARIN) 0.625 MG tablet       fluticasone (FLONASE) 50 MCG/ACT nasal spray fluticasone 50 mcg/actuation nasal spray,suspension spray 1 spray (50 mcg) in each nostril by intranasal route once daily   Active      ketoconazole (NIZORAL) 2 % cream Apply 1 Application topically to the appropriate area as directed 2 (Two) Times a Day. APPLY TO AFFECTED AREA      pantoprazole (PROTONIX) 40 MG EC tablet Take 1 tablet by mouth 2 (two) times a day.      Pyrithione Zinc 0.25 % liquid DermaZinc Spray 0.25 % topical spray,non-aerosol apply as directed by topical route once   Active      Sodium Chloride-Xylitol (XLEAR SINUS CARE SPRAY NA) Nasally      triamcinolone (KENALOG) 0.1 % cream       XYLITOL MT xylitol sinus spray nasal spray as directed   Suspended       No current facility-administered medications for this visit.     Review of Systems   Constitutional: Negative.    Skin:         Painful toenails.   Right great toenail lateral border is healing without complications.   All other systems reviewed and are negative.      OBJECTIVE     Vitals:    10/30/24 1440   BP: 159/91   Pulse: 93   SpO2: 98%           Patient seen in no apparent distress.      PHYSICAL EXAM:      Foot/Ankle Exam    GENERAL  Appearance:  appears stated age  Orientation:  AAOx3  Affect:  appropriate  Gait:  antalgic  Assistance:  cane use  Right shoe gear: casual shoe  Left shoe gear: casual shoe    VASCULAR     Right Foot Vascularity   Normal vascular exam    Dorsalis pedis:  2+  Posterior tibial:  2+  Skin temperature:  warm  Edema grading:  None  CFT:  < 3 seconds  Pedal hair growth:  Present  Varicosities:  none     Left Foot Vascularity   Normal vascular exam    Dorsalis pedis:  2+  Posterior tibial:  2+  Skin temperature:  warm  Edema grading:  None  CFT:  < 3 seconds  Pedal hair growth:  Present  Varicosities:  none     NEUROLOGIC     Right Foot Neurologic   Normal sensation    Light touch sensation: normal  Vibratory sensation: normal  Hot/Cold sensation: normal  Protective Sensation using [place]-Vanessa Monofilament:   Sites intact: 10  Sites tested: 10     Left Foot Neurologic   Normal sensation    Light touch sensation: normal  Vibratory sensation: normal  Hot/Cold sensation:  normal  Protective Sensation using [place]-Vanessa Monofilament:   Sites intact: 10  Sites tested: 10    MUSCULOSKELETAL     Right Foot Musculoskeletal   Arch:  Pes cavus     Left Foot Musculoskeletal   Arch:  Pes cavus    MUSCLE STRENGTH     Right Foot Muscle Strength   Foot dorsiflexion:  4-  Foot plantar flexion:  4-  Foot inversion:  4-  Foot eversion:  4-     Left Foot Muscle Strength   Foot dorsiflexion:  4-  Foot plantar flexion:  4-  Foot inversion:  4-  Foot eversion:  4-    RANGE OF MOTION     Right Foot Range of Motion   Foot and ankle ROM within normal limits       Left Foot Range of Motion   Foot and ankle ROM within normal limits      DERMATOLOGIC      Right Foot Dermatologic   Skin  Right foot skin is intact.   Nails  2.  Positive for elongated, onychomycosis, abnormal thickness, subungual debris and ingrown toenail.  3.  Positive for elongated, onychomycosis, abnormal thickness, subungual debris and  ingrown toenail.  4.  Positive for elongated, onychomycosis, abnormal thickness, subungual debris and ingrown toenail.  5.  Positive for elongated, onychomycosis, abnormal thickness, subungual debris and ingrown toenail.     Left Foot Dermatologic   Skin  Left foot skin is intact.   Nails  1.  Positive for elongated, onychomycosis, abnormal thickness, subungual debris and ingrown toenail.  2.  Positive for elongated, onychomycosis, abnormal thickness, subungual debris and ingrown toenail.  3.  Positive for elongated, onychomycosis, abnormal thickness, subungual debris and ingrown toenail.  4.  Positive for elongated, onychomycosis, abnormally thick, subungual debris and ingrown toenail.  5.  Positive for elongated, onychomycosis, abnormally thick, subungual debris and ingrown toenail.    I have reexamined the patient the results are consistent with the previously documented exam.    ASSESSMENT/PLAN     Diagnoses and all orders for this visit:    1. Difficulty walking (Primary)    2. Onychocryptosis    3. Foot pain, bilateral    4. Onychomycosis    Comprehensive lower extremity examination and evaluation was performed.    Discussed findings and treatment plan including risks, benefits, and treatment options with patient in detail. Patient agreed with treatment plan.    Toenails 2, 3, 4, 5 on Right and 1, 2, 3, 4, 5 on Left were debrided with nail nippers then filed with a Dremel nail emilia.  Patient tolerated procedure well without complications.    An After Visit Summary was printed and given to the patient at discharge, including (if requested) any available informative/educational handouts regarding diagnosis, treatment, or medications. All questions were answered to patient/family satisfaction. Should symptoms fail to improve or worsen they agree to call or return to clinic or to go to the Emergency Department. Discussed the importance of following up with any needed screening tests/labs/specialist appointments  and any requested follow-up recommended by me today. Importance of maintaining follow-up discussed and patient accepts that missed appointments can delay diagnosis and potentially lead to worsening of conditions.    Return in about 9 weeks (around 1/1/2025) for Toenail Care., or sooner if acute issues arise.    I have reviewed the assessment and plan and verified the accuracy of it. No changes to assessment and plan since the information was documented. Bertin Fountain DPM 10/30/24     I have dictated this note utilizing Dragon Dictation.  Please note that portions of this note were completed with a voice recognition program.  Part of this note may be an electronic transcription/translation of spoken language to printed text using the Dragon Dictation System.      This document has been electronically signed by Bertin Fountain DPM on October 30, 2024 14:51 EDT

## 2024-12-11 ENCOUNTER — TELEPHONE (OUTPATIENT)
Dept: OTOLARYNGOLOGY | Facility: CLINIC | Age: 54
End: 2024-12-11

## 2024-12-11 NOTE — TELEPHONE ENCOUNTER
Caller: MO DEL CID    Relationship: SELF    Best call back number: [phone] (home)      Requested Prescriptions: cetirizine-pseudoephedrine (ZyrTEC-D) 5-120 MG per 12 hr tablet   Requested Prescriptions      No prescriptions requested or ordered in this encounter        Pharmacy where request should be sent:      [id number] - [place], [place] - [address] AT [facility] [phone] [facility] [phone] FX       Last office visit with prescribing clinician: 5/8/2024   Last telemedicine visit with prescribing clinician: Visit date not found   Next office visit with prescribing clinician: Visit date not found     Additional details provided by patient: PLEASE CALL PT WHEN SENT AND IF CAN ADD ADDITIONAL REFILLS TO SCRIPT. THANK YOU.    Does the patient have less than a 3 day supply:  [] Yes  [x] No    Would you like a call back once the refill request has been completed: [x] Yes [] No    If the office needs to give you a call back, can they leave a voicemail: [x] Yes [] No    Jaxon Michel Rep   12/11/24 11:06 EST

## 2025-01-06 DIAGNOSIS — J31.0 CHRONIC RHINITIS: ICD-10-CM

## 2025-01-06 RX ORDER — CETIRIZINE HYDROCHLORIDE, PSEUDOEPHEDRINE HYDROCHLORIDE 5; 120 MG/1; MG/1
1 TABLET, FILM COATED, EXTENDED RELEASE ORAL 2 TIMES DAILY
Qty: 60 TABLET | Refills: 6 | Status: SHIPPED | OUTPATIENT
Start: 2025-01-06

## 2025-01-14 ENCOUNTER — OFFICE VISIT (OUTPATIENT)
Dept: PODIATRY | Facility: CLINIC | Age: 55
End: 2025-01-14
Payer: MEDICARE

## 2025-01-14 VITALS
DIASTOLIC BLOOD PRESSURE: 91 MMHG | BODY MASS INDEX: 22.51 KG/M2 | SYSTOLIC BLOOD PRESSURE: 158 MMHG | HEART RATE: 87 BPM | WEIGHT: 104 LBS | TEMPERATURE: 97 F | OXYGEN SATURATION: 98 %

## 2025-01-14 DIAGNOSIS — M79.672 FOOT PAIN, BILATERAL: ICD-10-CM

## 2025-01-14 DIAGNOSIS — M79.671 FOOT PAIN, BILATERAL: ICD-10-CM

## 2025-01-14 DIAGNOSIS — R26.2 DIFFICULTY WALKING: Primary | ICD-10-CM

## 2025-01-14 DIAGNOSIS — B35.1 ONYCHOMYCOSIS: ICD-10-CM

## 2025-01-14 DIAGNOSIS — L60.0 ONYCHOCRYPTOSIS: ICD-10-CM

## 2025-01-14 RX ORDER — CETIRIZINE HYDROCHLORIDE 10 MG/1
1 TABLET ORAL DAILY
COMMUNITY

## 2025-01-14 NOTE — PROGRESS NOTES
[facility] - PODIATRY    Today's Date: 01/14/25    Patient Name: Carmita Devi  MRN: [id number]  CSN: [id number]  PCP: Joseluis, last PCP visit: 8 July 2024  Referring Provider: No ref. provider found    SUBJECTIVE     Chief Complaint   Patient presents with    Left Foot - Follow-up, Nail Problem    Right Foot - Follow-up, Nail Problem     HPI: Carmita Devi, a 54 y.o.female, comes to clinic.    New, Established, New Problem:  Established    Location:  Toenails    Duration:   Greater than five years    Onset:  Gradual    Nature:  sore with palpation.    Stable, worsening, improving:   recurring    Aggravating factors:  Pain with shoe gear and ambulation.    Previous Treatment:  debridement  _______________________________________________________________________    Medical changes:  no changes    Patient denies any fevers, chills, nausea, vomiting, shortness of breathe, nor any other constitutional signs nor symptoms.       I have reviewed/confirmed previously documented HPI with no changes.     Past Medical History:   Diagnosis Date    Allergic rhinitis     Bilateral foot pain     Chronic frontal sinusitis     Chronic maxillary sinusitis     Corns and callus     Deviated septum     Difficulty walking     Facial basal cell cancer     GERD (gastroesophageal reflux disease)     HBP (high blood pressure)     Headache     Hoarseness     Hypertension     Ingrowing toenail     Mentally challenged     Myofascial muscle pain     Osteoporosis     Otalgia     Tinea unguium      Past Surgical History:   Procedure Laterality Date    FOOT SURGERY  NOVEMBER 2022    SINUS SURGERY      SKIN CANCER EXCISION       Family History   Problem Relation Age of Onset    Skin cancer Father     Cancer Father         basil cell skin cancer     Social History     Socioeconomic History    Marital status: Single   Tobacco Use    Smoking status: Never     Passive exposure: Never    Smokeless tobacco: Never   Vaping Use    Vaping  status: Never Used   Substance and Sexual Activity    Alcohol use: Never    Drug use: Never    Sexual activity: Never     Allergies   Allergen Reactions    Alendronate Sodium Other (See Comments)     Severe heartburn      Sulfa Antibiotics Rash     Legs and face swelling      Current Outpatient Medications   Medication Sig Dispense Refill    baclofen (LIORESAL) 10 MG tablet       cetirizine (zyrTEC) 10 MG tablet Take 1 tablet by mouth Daily.      cetirizine-pseudoephedrine (ZyrTEC-D) 5-120 MG per 12 hr tablet Take 1 tablet by mouth 2 (Two) Times a Day. 60 tablet 6    Cholecalciferol 50 MCG (2000 UT) capsule Vitamin D3 2,000 unit oral capsule take 1 capsule by oral route daily   Active      denosumab (Prolia) 60 MG/ML solution prefilled syringe syringe 1 mL.      estradiol (ESTRACE) 0.1 MG/GM vaginal cream       estrogens, conjugated, (PREMARIN) 0.625 MG tablet       fluticasone (FLONASE) 50 MCG/ACT nasal spray fluticasone 50 mcg/actuation nasal spray,suspension spray 1 spray (50 mcg) in each nostril by intranasal route once daily   Active      ketoconazole (NIZORAL) 2 % cream Apply 1 Application topically to the appropriate area as directed 2 (Two) Times a Day. APPLY TO AFFECTED AREA      pantoprazole (PROTONIX) 40 MG EC tablet Take 1 tablet by mouth 2 (two) times a day.      Pyrithione Zinc 0.25 % liquid DermaZinc Spray 0.25 % topical spray,non-aerosol apply as directed by topical route once   Active      Sodium Chloride-Xylitol (XLEAR SINUS CARE SPRAY NA) Nasally      triamcinolone (KENALOG) 0.1 % cream       XYLITOL MT xylitol sinus spray nasal spray as directed   Suspended       No current facility-administered medications for this visit.     Review of Systems   Constitutional: Negative.    Skin:         Painful toenails.   Right great toenail lateral border is healing without complications.   All other systems reviewed and are negative.      OBJECTIVE     Vitals:    01/14/25 1426   BP: 158/91   Pulse: 87    Temp: 97 °F (36.1 °C)   SpO2: 98%           Patient seen in no apparent distress.      PHYSICAL EXAM:     Foot/Ankle Exam    GENERAL  Appearance:  appears stated age  Orientation:  AAOx3  Affect:  appropriate  Gait:  antalgic  Assistance:  cane use  Right shoe gear: casual shoe  Left shoe gear: casual shoe    VASCULAR     Right Foot Vascularity   Normal vascular exam    Dorsalis pedis:  2+  Posterior tibial:  2+  Skin temperature:  warm  Edema grading:  None  CFT:  < 3 seconds  Pedal hair growth:  Present  Varicosities:  none     Left Foot Vascularity   Normal vascular exam    Dorsalis pedis:  2+  Posterior tibial:  2+  Skin temperature:  warm  Edema grading:  None  CFT:  < 3 seconds  Pedal hair growth:  Present  Varicosities:  none     NEUROLOGIC     Right Foot Neurologic   Normal sensation    Light touch sensation: normal  Vibratory sensation: normal  Hot/Cold sensation: normal  Protective Sensation using [place]-Vanessa Monofilament:   Sites intact: 10  Sites tested: 10     Left Foot Neurologic   Normal sensation    Light touch sensation: normal  Vibratory sensation: normal  Hot/Cold sensation:  normal  Protective Sensation using [place]-Vanessa Monofilament:   Sites intact: 10  Sites tested: 10    MUSCULOSKELETAL     Right Foot Musculoskeletal   Arch:  Pes cavus     Left Foot Musculoskeletal   Arch:  Pes cavus    MUSCLE STRENGTH     Right Foot Muscle Strength   Foot dorsiflexion:  4-  Foot plantar flexion:  4-  Foot inversion:  4-  Foot eversion:  4-     Left Foot Muscle Strength   Foot dorsiflexion:  4-  Foot plantar flexion:  4-  Foot inversion:  4-  Foot eversion:  4-    RANGE OF MOTION     Right Foot Range of Motion   Foot and ankle ROM within normal limits       Left Foot Range of Motion   Foot and ankle ROM within normal limits      DERMATOLOGIC      Right Foot Dermatologic   Skin  Right foot skin is intact.   Nails  2.  Positive for elongated, onychomycosis, abnormal thickness, subungual debris and  ingrown toenail.  3.  Positive for elongated, onychomycosis, abnormal thickness, subungual debris and ingrown toenail.  4.  Positive for elongated, onychomycosis, abnormal thickness, subungual debris and ingrown toenail.  5.  Positive for elongated, onychomycosis, abnormal thickness, subungual debris and ingrown toenail.     Left Foot Dermatologic   Skin  Left foot skin is intact.   Nails  1.  Positive for elongated, onychomycosis, abnormal thickness, subungual debris and ingrown toenail.  2.  Positive for elongated, onychomycosis, abnormal thickness, subungual debris and ingrown toenail.  3.  Positive for elongated, onychomycosis, abnormal thickness, subungual debris and ingrown toenail.  4.  Positive for elongated, onychomycosis, abnormally thick, subungual debris and ingrown toenail.  5.  Positive for elongated, onychomycosis, abnormally thick, subungual debris and ingrown toenail.    I have reexamined the patient the results are consistent with the previously documented exam.    ASSESSMENT/PLAN     Diagnoses and all orders for this visit:    1. Difficulty walking (Primary)    2. Onychocryptosis    3. Foot pain, bilateral    4. Onychomycosis    Comprehensive lower extremity examination and evaluation was performed.    Discussed findings and treatment plan including risks, benefits, and treatment options with patient in detail. Patient agreed with treatment plan.    Toenails 2, 3, 4, 5 on Right and 1, 2, 3, 4, 5 on Left were debrided with nail nippers then filed with a Dremel nail emilia.  Patient tolerated procedure well without complications.    An After Visit Summary was printed and given to the patient at discharge, including (if requested) any available informative/educational handouts regarding diagnosis, treatment, or medications. All questions were answered to patient/family satisfaction. Should symptoms fail to improve or worsen they agree to call or return to clinic or to go to the Emergency Department.  Discussed the importance of following up with any needed screening tests/labs/specialist appointments and any requested follow-up recommended by me today. Importance of maintaining follow-up discussed and patient accepts that missed appointments can delay diagnosis and potentially lead to worsening of conditions.    Return in about 9 weeks (around 3/18/2025) for Toenail Care., or sooner if acute issues arise.    I have reviewed the assessment and plan and verified the accuracy of it. No changes to assessment and plan since the information was documented. Bertin Fountain DPM 01/14/25     I have dictated this note utilizing Dragon Dictation.  Please note that portions of this note were completed with a voice recognition program.  Part of this note may be an electronic transcription/translation of spoken language to printed text using the Dragon Dictation System.      This document has been electronically signed by Bertin Fountain DPM on January 14, 2025 14:43 EST

## 2025-03-18 ENCOUNTER — OFFICE VISIT (OUTPATIENT)
Dept: PODIATRY | Facility: CLINIC | Age: 55
End: 2025-03-18
Payer: MEDICARE

## 2025-03-18 VITALS
SYSTOLIC BLOOD PRESSURE: 149 MMHG | TEMPERATURE: 98 F | HEART RATE: 96 BPM | WEIGHT: 106 LBS | BODY MASS INDEX: 22.94 KG/M2 | DIASTOLIC BLOOD PRESSURE: 87 MMHG | OXYGEN SATURATION: 96 %

## 2025-03-18 DIAGNOSIS — L89.891 PRESSURE INJURY OF RIGHT FOOT, STAGE 1: ICD-10-CM

## 2025-03-18 DIAGNOSIS — R26.2 DIFFICULTY WALKING: Primary | ICD-10-CM

## 2025-03-18 DIAGNOSIS — B35.1 ONYCHOMYCOSIS: ICD-10-CM

## 2025-03-18 DIAGNOSIS — M79.671 FOOT PAIN, BILATERAL: ICD-10-CM

## 2025-03-18 DIAGNOSIS — M79.672 FOOT PAIN, BILATERAL: ICD-10-CM

## 2025-03-18 DIAGNOSIS — L60.0 ONYCHOCRYPTOSIS: ICD-10-CM

## 2025-03-18 NOTE — PROGRESS NOTES
[facility] - PODIATRY    Today's Date: 03/18/25    Patient Name: Carmita Devi  MRN: [id number]  CSN: [id number]  PCP: Joseluis, last PCP visit:1/10/2025  Referring Provider: No ref. provider found    SUBJECTIVE     Chief Complaint   Patient presents with    Left Foot - Follow-up, Nail Problem    Right Foot - Follow-up, Nail Problem     HPI: Carmita Devi, a 55 y.o.female, comes to clinic.    New, Established, New Problem:  Established    Location:  Toenails    Duration:   Greater than five years    Onset:  Gradual    Nature:  sore with palpation.    Stable, worsening, improving:   stable    Aggravating factors:  Pain with shoe gear and ambulation.    Previous Treatment:  debridement    Patient complains of painful hyperkeratotic lesions under plantar forefoot.  _______________________________________________________________________    Medical changes:  none    Patient denies any fevers, chills, nausea, vomiting, shortness of breathe, nor any other constitutional signs nor symptoms.       I have reviewed/confirmed previously documented HPI with no changes.     Past Medical History:   Diagnosis Date    Allergic rhinitis     Bilateral foot pain     Chronic frontal sinusitis     Chronic maxillary sinusitis     Corns and callus     Deviated septum     Difficulty walking     Facial basal cell cancer     GERD (gastroesophageal reflux disease)     HBP (high blood pressure)     Headache     Hoarseness     Hypertension     Ingrowing toenail     Mentally challenged     Myofascial muscle pain     Osteoporosis     Otalgia     Tinea unguium      Past Surgical History:   Procedure Laterality Date    FOOT SURGERY  NOVEMBER 2022    SINUS SURGERY      SKIN CANCER EXCISION       Family History   Problem Relation Age of Onset    Skin cancer Father     Cancer Father         basil cell skin cancer     Social History     Socioeconomic History    Marital status: Single   Tobacco Use    Smoking status: Never     Passive  exposure: Never    Smokeless tobacco: Never   Vaping Use    Vaping status: Never Used   Substance and Sexual Activity    Alcohol use: Never    Drug use: Never    Sexual activity: Never     Allergies   Allergen Reactions    Alendronate Sodium Other (See Comments)     Severe heartburn      Sulfa Antibiotics Rash     Legs and face swelling      Current Outpatient Medications   Medication Sig Dispense Refill    baclofen (LIORESAL) 10 MG tablet       cetirizine (zyrTEC) 10 MG tablet Take 1 tablet by mouth Daily.      cetirizine-pseudoephedrine (ZyrTEC-D) 5-120 MG per 12 hr tablet Take 1 tablet by mouth 2 (Two) Times a Day. 60 tablet 6    Cholecalciferol 50 MCG (2000 UT) capsule Vitamin D3 2,000 unit oral capsule take 1 capsule by oral route daily   Active      denosumab (Prolia) 60 MG/ML solution prefilled syringe syringe 1 mL.      estradiol (ESTRACE) 0.1 MG/GM vaginal cream       estrogens, conjugated, (PREMARIN) 0.625 MG tablet       fluticasone (FLONASE) 50 MCG/ACT nasal spray fluticasone 50 mcg/actuation nasal spray,suspension spray 1 spray (50 mcg) in each nostril by intranasal route once daily   Active      ketoconazole (NIZORAL) 2 % cream Apply 1 Application topically to the appropriate area as directed 2 (Two) Times a Day. APPLY TO AFFECTED AREA      pantoprazole (PROTONIX) 40 MG EC tablet Take 1 tablet by mouth 2 (two) times a day.      Pyrithione Zinc 0.25 % liquid DermaZinc Spray 0.25 % topical spray,non-aerosol apply as directed by topical route once   Active      Sodium Chloride-Xylitol (XLEAR SINUS CARE SPRAY NA) Nasally      triamcinolone (KENALOG) 0.1 % cream       XYLITOL MT xylitol sinus spray nasal spray as directed   Suspended       No current facility-administered medications for this visit.     Review of Systems   Constitutional: Negative.    Skin:         Painful toenails.   Painful hyperkeratotic lesions on plantar right forefoot.   All other systems reviewed and are negative.      OBJECTIVE      Vitals:    03/18/25 1436   BP: 149/87   Pulse: 96   Temp: 98 °F (36.7 °C)   SpO2: 96%           Patient seen in no apparent distress.      PHYSICAL EXAM:     Foot/Ankle Exam    GENERAL  Appearance:  appears stated age  Orientation:  AAOx3  Affect:  appropriate  Gait:  antalgic  Assistance:  cane use  Right shoe gear: casual shoe  Left shoe gear: casual shoe    VASCULAR     Right Foot Vascularity   Normal vascular exam    Dorsalis pedis:  2+  Posterior tibial:  2+  Skin temperature:  warm  Edema grading:  None  CFT:  < 3 seconds  Pedal hair growth:  Present  Varicosities:  none     Left Foot Vascularity   Normal vascular exam    Dorsalis pedis:  2+  Posterior tibial:  2+  Skin temperature:  warm  Edema grading:  None  CFT:  < 3 seconds  Pedal hair growth:  Present  Varicosities:  none     NEUROLOGIC     Right Foot Neurologic   Normal sensation    Light touch sensation: normal  Vibratory sensation: normal  Hot/Cold sensation: normal  Protective Sensation using [place]-Vanessa Monofilament:   Sites intact: 10  Sites tested: 10     Left Foot Neurologic   Normal sensation    Light touch sensation: normal  Vibratory sensation: normal  Hot/Cold sensation:  normal  Protective Sensation using [place]-Vanessa Monofilament:   Sites intact: 10  Sites tested: 10    MUSCULOSKELETAL     Right Foot Musculoskeletal   Arch:  Pes cavus     Left Foot Musculoskeletal   Arch:  Pes cavus    MUSCLE STRENGTH     Right Foot Muscle Strength   Foot dorsiflexion:  4-  Foot plantar flexion:  4-  Foot inversion:  4-  Foot eversion:  4-     Left Foot Muscle Strength   Foot dorsiflexion:  4-  Foot plantar flexion:  4-  Foot inversion:  4-  Foot eversion:  4-    RANGE OF MOTION     Right Foot Range of Motion   Foot and ankle ROM within normal limits       Left Foot Range of Motion   Foot and ankle ROM within normal limits      DERMATOLOGIC      Right Foot Dermatologic   Skin  Positive for ulcer (Plantar right forefoot).   Nails  2.  Positive  for elongated, onychomycosis, abnormal thickness, subungual debris and ingrown toenail.  3.  Positive for elongated, onychomycosis, abnormal thickness, subungual debris and ingrown toenail.  4.  Positive for elongated, onychomycosis, abnormal thickness, subungual debris and ingrown toenail.  5.  Positive for elongated, onychomycosis, abnormal thickness, subungual debris and ingrown toenail.     Left Foot Dermatologic   Skin  Left foot skin is intact.   Nails  1.  Positive for elongated, onychomycosis, abnormal thickness, subungual debris and ingrown toenail.  2.  Positive for elongated, onychomycosis, abnormal thickness, subungual debris and ingrown toenail.  3.  Positive for elongated, onychomycosis, abnormal thickness, subungual debris and ingrown toenail.  4.  Positive for elongated, onychomycosis, abnormally thick, subungual debris and ingrown toenail.  5.  Positive for elongated, onychomycosis, abnormally thick, subungual debris and ingrown toenail.     Right foot additional comments: Stage 1 ulceration on the plantar right forefoot area of the foot.  Hyperkeratotic tissue with subepidermal hemosiderin deposition is present.  No surrounding, edema, erythema, lymphangitis, fluctuance, nor signs of infection.  No drainage present.  15 mm x 15 mm x 3 mm.  This area was debrided via excisional partial thickness debridement with a 10 scalpel blade.  Post debridement measurements were 12 mm x 11 mm x 1 mm in depth.    I have reexamined the patient the results are consistent with the previously documented exam.    ASSESSMENT/PLAN     Diagnoses and all orders for this visit:    1. Difficulty walking (Primary)    2. Onychocryptosis    3. Foot pain, bilateral    4. Onychomycosis    5. Pressure injury of right foot, stage 1    Comprehensive lower extremity examination and evaluation was performed.    Discussed findings and treatment plan including risks, benefits, and treatment options with patient in detail. Patient  agreed with treatment plan.    Toenails 2, 3, 4, 5 on Right and 1, 2, 3, 4, 5 on Left were debrided with nail nippers then filed with a Dremel nail emilia.  Patient tolerated procedure well without complications.    An After Visit Summary was printed and given to the patient at discharge, including (if requested) any available informative/educational handouts regarding diagnosis, treatment, or medications. All questions were answered to patient/family satisfaction. Should symptoms fail to improve or worsen they agree to call or return to clinic or to go to the Emergency Department. Discussed the importance of following up with any needed screening tests/labs/specialist appointments and any requested follow-up recommended by me today. Importance of maintaining follow-up discussed and patient accepts that missed appointments can delay diagnosis and potentially lead to worsening of conditions.    Return in about 9 weeks (around 5/20/2025) for Toenail Care, Ulcer Follow-Up., or sooner if acute issues arise.    I have reviewed the assessment and plan and verified the accuracy of it. No changes to assessment and plan since the information was documented. Bertin Fountain DPM 03/18/25     I have dictated this note utilizing Dragon Dictation.  Please note that portions of this note were completed with a voice recognition program.  Part of this note may be an electronic transcription/translation of spoken language to printed text using the Dragon Dictation System.      This document has been electronically signed by Bertin Fountain DPM on March 18, 2025 15:00 EDT

## 2025-03-19 ENCOUNTER — TELEPHONE (OUTPATIENT)
Dept: OTOLARYNGOLOGY | Facility: CLINIC | Age: 55
End: 2025-03-19

## 2025-03-19 NOTE — TELEPHONE ENCOUNTER
Caller: Carmita Devi     Relationship: SELF    Best call back number: [phone]     Which medication are you concerned about: cetirizine-pseudoephedrine (ZyrTEC-D) 5-120 MG per 12 hr tablet     Who prescribed you this medication: DR DURON    When did you start taking this medication: JANUARY    What are your concerns: PT CHANGED INSURANCE AND WOULD LIKE TO HAVE THIS PRESCRIPTION [facility] [id number] [address] [facility] / [address] AT [facility] [address] - [phone] [facility] [phone] [[id number]]     PT HAS A FEW DAYS WORTH LEFT. SHE IS HOPING TO PICK THE MEDICATION UP AT [facility] TOMORROW.    How long have you had these concerns: TODAY

## 2025-03-20 DIAGNOSIS — J31.0 CHRONIC RHINITIS: ICD-10-CM

## 2025-03-20 RX ORDER — CETIRIZINE HYDROCHLORIDE, PSEUDOEPHEDRINE HYDROCHLORIDE 5; 120 MG/1; MG/1
1 TABLET, FILM COATED, EXTENDED RELEASE ORAL 2 TIMES DAILY
Qty: 60 TABLET | Refills: 6 | Status: SHIPPED | OUTPATIENT
Start: 2025-03-20

## 2025-06-10 ENCOUNTER — HOSPITAL ENCOUNTER (OUTPATIENT)
Dept: GENERAL RADIOLOGY | Facility: HOSPITAL | Age: 55
Discharge: HOME | End: 2025-06-10
Admitting: INTERNAL MEDICINE
Payer: MEDICARE

## 2025-06-10 ENCOUNTER — TRANSCRIBE ORDERS (OUTPATIENT)
Dept: GENERAL RADIOLOGY | Facility: HOSPITAL | Age: 55
End: 2025-06-10
Payer: MEDICARE

## 2025-06-10 DIAGNOSIS — M25.531 RIGHT WRIST PAIN: ICD-10-CM

## 2025-06-10 DIAGNOSIS — M25.531 RIGHT WRIST PAIN: Primary | ICD-10-CM

## 2025-06-10 PROCEDURE — 73110 X-RAY EXAM OF WRIST: CPT

## 2025-06-24 ENCOUNTER — OFFICE VISIT (OUTPATIENT)
Dept: PODIATRY | Facility: CLINIC | Age: 55
End: 2025-06-24
Payer: MEDICARE

## 2025-06-24 VITALS
WEIGHT: 106 LBS | SYSTOLIC BLOOD PRESSURE: 144 MMHG | DIASTOLIC BLOOD PRESSURE: 88 MMHG | BODY MASS INDEX: 22.87 KG/M2 | OXYGEN SATURATION: 99 % | HEART RATE: 102 BPM | HEIGHT: 57 IN

## 2025-06-24 DIAGNOSIS — R26.2 DIFFICULTY WALKING: Primary | ICD-10-CM

## 2025-06-24 DIAGNOSIS — M79.671 FOOT PAIN, BILATERAL: ICD-10-CM

## 2025-06-24 DIAGNOSIS — M79.672 FOOT PAIN, BILATERAL: ICD-10-CM

## 2025-06-24 DIAGNOSIS — L60.0 ONYCHOCRYPTOSIS: ICD-10-CM

## 2025-06-24 DIAGNOSIS — L89.891 PRESSURE INJURY OF RIGHT FOOT, STAGE 1: ICD-10-CM

## 2025-06-24 DIAGNOSIS — B35.1 ONYCHOMYCOSIS: ICD-10-CM

## 2025-06-24 PROCEDURE — 1160F RVW MEDS BY RX/DR IN RCRD: CPT | Performed by: PODIATRIST

## 2025-06-24 PROCEDURE — 97597 DBRDMT OPN WND 1ST 20 CM/<: CPT | Performed by: PODIATRIST

## 2025-06-24 PROCEDURE — 3077F SYST BP >= 140 MM HG: CPT | Performed by: PODIATRIST

## 2025-06-24 PROCEDURE — 1159F MED LIST DOCD IN RCRD: CPT | Performed by: PODIATRIST

## 2025-06-24 PROCEDURE — 3079F DIAST BP 80-89 MM HG: CPT | Performed by: PODIATRIST

## 2025-06-24 PROCEDURE — 11721 DEBRIDE NAIL 6 OR MORE: CPT | Performed by: PODIATRIST

## 2025-06-24 NOTE — PROGRESS NOTES
[facility] - PODIATRY    Today's Date: 06/24/25    Patient Name: Carmita Devi  MRN: [id number]  CSN: [id number]  PCP: Joseluis, last PCP visit:  6/10/2025  Referring Provider: No ref. provider found    SUBJECTIVE     Chief Complaint   Patient presents with    Left Foot - Follow-up, Nail Problem    Right Foot - Follow-up, Nail Problem     HPI: Carmita Devi, a 55 y.o.female, comes to clinic.    New, Established, New Problem:  Established    Location:  Toenails    Duration:   Greater than five years    Onset:  Gradual    Nature:  sore with palpation.    Stable, worsening, improving:   stable    Aggravating factors:  Pain with shoe gear and ambulation.    Previous Treatment:  debridement    Patient complains of painful hyperkeratotic lesions under plantar forefoot.  _______________________________________________________________________    Medical changes:  no changes    Patient denies any fevers, chills, nausea, vomiting, shortness of breathe, nor any other constitutional signs nor symptoms.       I have reviewed/confirmed previously documented HPI with no changes.     Past Medical History:   Diagnosis Date    Allergic rhinitis     Bilateral foot pain     Chronic frontal sinusitis     Chronic maxillary sinusitis     Corns and callus     Deviated septum     Difficulty walking     Facial basal cell cancer     GERD (gastroesophageal reflux disease)     HBP (high blood pressure)     Headache     Hoarseness     Hypertension     Ingrowing toenail     Mentally challenged     Myofascial muscle pain     Osteoporosis     Otalgia     Tinea unguium      Past Surgical History:   Procedure Laterality Date    FOOT SURGERY  NOVEMBER 2022    SINUS SURGERY      SKIN CANCER EXCISION       Family History   Problem Relation Age of Onset    Skin cancer Father     Cancer Father         basil cell skin cancer     Social History     Socioeconomic History    Marital status: Single   Tobacco Use    Smoking status: Never      Passive exposure: Never    Smokeless tobacco: Never   Vaping Use    Vaping status: Never Used   Substance and Sexual Activity    Alcohol use: Never    Drug use: Never    Sexual activity: Never     Allergies   Allergen Reactions    Alendronate Sodium Other (See Comments)     Severe heartburn      Sulfa Antibiotics Rash     Legs and face swelling      Current Outpatient Medications   Medication Sig Dispense Refill    baclofen (LIORESAL) 10 MG tablet       cetirizine (zyrTEC) 10 MG tablet Take 1 tablet by mouth Daily.      cetirizine-pseudoephedrine (ZyrTEC-D) 5-120 MG per 12 hr tablet Take 1 tablet by mouth 2 (Two) Times a Day. 60 tablet 6    Cholecalciferol 50 MCG (2000 UT) capsule Vitamin D3 2,000 unit oral capsule take 1 capsule by oral route daily   Active      denosumab (Prolia) 60 MG/ML solution prefilled syringe syringe 1 mL.      estradiol (ESTRACE) 0.1 MG/GM vaginal cream       estrogens, conjugated, (PREMARIN) 0.625 MG tablet       fluticasone (FLONASE) 50 MCG/ACT nasal spray fluticasone 50 mcg/actuation nasal spray,suspension spray 1 spray (50 mcg) in each nostril by intranasal route once daily   Active      ketoconazole (NIZORAL) 2 % cream Apply 1 Application topically to the appropriate area as directed 2 (Two) Times a Day. APPLY TO AFFECTED AREA      pantoprazole (PROTONIX) 40 MG EC tablet Take 1 tablet by mouth 2 (two) times a day.      Pyrithione Zinc 0.25 % liquid DermaZinc Spray 0.25 % topical spray,non-aerosol apply as directed by topical route once   Active      Sodium Chloride-Xylitol (XLEAR SINUS CARE SPRAY NA) Nasally      triamcinolone (KENALOG) 0.1 % cream       XYLITOL MT xylitol sinus spray nasal spray as directed   Suspended       No current facility-administered medications for this visit.     Review of Systems   Constitutional: Negative.    Skin:         Painful toenails.   Painful hyperkeratotic lesions on plantar right forefoot.   All other systems reviewed and are  negative.      OBJECTIVE     Vitals:    06/24/25 1323   BP: 144/88   Pulse: 102   SpO2: 99%           Patient seen in no apparent distress.      PHYSICAL EXAM:     Foot/Ankle Exam    GENERAL  Appearance:  appears stated age  Orientation:  AAOx3  Affect:  appropriate  Gait:  antalgic  Assistance:  cane use  Right shoe gear: casual shoe  Left shoe gear: casual shoe    VASCULAR     Right Foot Vascularity   Normal vascular exam    Dorsalis pedis:  2+  Posterior tibial:  2+  Skin temperature:  warm  Edema grading:  None  CFT:  < 3 seconds  Pedal hair growth:  Present  Varicosities:  none     Left Foot Vascularity   Normal vascular exam    Dorsalis pedis:  2+  Posterior tibial:  2+  Skin temperature:  warm  Edema grading:  None  CFT:  < 3 seconds  Pedal hair growth:  Present  Varicosities:  none     NEUROLOGIC     Right Foot Neurologic   Normal sensation    Light touch sensation: normal  Vibratory sensation: normal  Hot/Cold sensation: normal  Protective Sensation using [place]-Vanessa Monofilament:   Sites intact: 10  Sites tested: 10     Left Foot Neurologic   Normal sensation    Light touch sensation: normal  Vibratory sensation: normal  Hot/Cold sensation:  normal  Protective Sensation using [place]-Vanessa Monofilament:   Sites intact: 10  Sites tested: 10    MUSCULOSKELETAL     Right Foot Musculoskeletal   Arch:  Pes cavus     Left Foot Musculoskeletal   Arch:  Pes cavus    MUSCLE STRENGTH     Right Foot Muscle Strength   Foot dorsiflexion:  4-  Foot plantar flexion:  4-  Foot inversion:  4-  Foot eversion:  4-     Left Foot Muscle Strength   Foot dorsiflexion:  4-  Foot plantar flexion:  4-  Foot inversion:  4-  Foot eversion:  4-    RANGE OF MOTION     Right Foot Range of Motion   Foot and ankle ROM within normal limits       Left Foot Range of Motion   Foot and ankle ROM within normal limits      DERMATOLOGIC      Right Foot Dermatologic   Skin  Positive for ulcer (Plantar right forefoot).   Nails  2.  Positive  for elongated, onychomycosis, abnormal thickness, subungual debris and ingrown toenail.  3.  Positive for elongated, onychomycosis, abnormal thickness, subungual debris and ingrown toenail.  4.  Positive for elongated, onychomycosis, abnormal thickness, subungual debris and ingrown toenail.  5.  Positive for elongated, onychomycosis, abnormal thickness, subungual debris and ingrown toenail.     Left Foot Dermatologic   Skin  Left foot skin is intact.   Nails  1.  Positive for elongated, onychomycosis, abnormal thickness, subungual debris and ingrown toenail.  2.  Positive for elongated, onychomycosis, abnormal thickness, subungual debris and ingrown toenail.  3.  Positive for elongated, onychomycosis, abnormal thickness, subungual debris and ingrown toenail.  4.  Positive for elongated, onychomycosis, abnormally thick, subungual debris and ingrown toenail.  5.  Positive for elongated, onychomycosis, abnormally thick, subungual debris and ingrown toenail.     Right foot additional comments: Stage 1 ulceration on the plantar right forefoot area of the foot.  Hyperkeratotic tissue with subepidermal hemosiderin deposition is present.  No surrounding, edema, erythema, lymphangitis, fluctuance, nor signs of infection.  No drainage present.  14 mm x 13 mm x 3 mm.  This area was debrided via excisional partial thickness debridement with a 10 scalpel blade.  Post debridement measurements were 12 mm x 11 mm x 1 mm in depth.    I have reexamined the patient the results are consistent with the previously documented exam.    ASSESSMENT/PLAN     Diagnoses and all orders for this visit:    1. Difficulty walking (Primary)    2. Onychocryptosis    3. Onychomycosis    4. Foot pain, bilateral    5. Pressure injury of right foot, stage 1    Comprehensive lower extremity examination and evaluation was performed.    Discussed findings and treatment plan including risks, benefits, and treatment options with patient in detail. Patient  agreed with treatment plan.    Toenails 2, 3, 4, 5 on Right and 1, 2, 3, 4, 5 on Left were debrided with nail nippers then filed with a Dremel nail emilia.  Patient tolerated procedure well without complications.    An After Visit Summary was printed and given to the patient at discharge, including (if requested) any available informative/educational handouts regarding diagnosis, treatment, or medications. All questions were answered to patient/family satisfaction. Should symptoms fail to improve or worsen they agree to call or return to clinic or to go to the Emergency Department. Discussed the importance of following up with any needed screening tests/labs/specialist appointments and any requested follow-up recommended by me today. Importance of maintaining follow-up discussed and patient accepts that missed appointments can delay diagnosis and potentially lead to worsening of conditions.    Return in about 9 weeks (around 8/26/2025) for Toenail Care, Ulcer Follow-Up., or sooner if acute issues arise.    I have reviewed the assessment and plan and verified the accuracy of it. No changes to assessment and plan since the information was documented. Bertin Fountain DPM 06/24/25     I have dictated this note utilizing Dragon Dictation.  Please note that portions of this note were completed with a voice recognition program.  Part of this note may be an electronic transcription/translation of spoken language to printed text using the Dragon Dictation System.      This document has been electronically signed by Bertin Fountain DPM on June 24, 2025 14:07 EDT

## 2025-06-26 ENCOUNTER — TRANSCRIBE ORDERS (OUTPATIENT)
Dept: ADMINISTRATIVE | Facility: HOSPITAL | Age: 55
End: 2025-06-26
Payer: MEDICARE

## 2025-06-26 DIAGNOSIS — Z12.31 SCREENING MAMMOGRAM, ENCOUNTER FOR: Primary | ICD-10-CM

## 2025-08-19 ENCOUNTER — TRANSCRIBE ORDERS (OUTPATIENT)
Dept: ADMINISTRATIVE | Facility: HOSPITAL | Age: 55
End: 2025-08-19
Payer: MEDICARE

## 2025-08-19 DIAGNOSIS — N63.0 MASS OF BREAST, UNSPECIFIED LATERALITY: Primary | ICD-10-CM

## 2025-08-26 ENCOUNTER — OFFICE VISIT (OUTPATIENT)
Dept: PODIATRY | Facility: CLINIC | Age: 55
End: 2025-08-26
Payer: MEDICARE

## 2025-08-26 VITALS
OXYGEN SATURATION: 99 % | SYSTOLIC BLOOD PRESSURE: 187 MMHG | BODY MASS INDEX: 23.08 KG/M2 | WEIGHT: 107 LBS | HEIGHT: 57 IN | DIASTOLIC BLOOD PRESSURE: 103 MMHG | HEART RATE: 138 BPM | TEMPERATURE: 97.7 F

## 2025-08-26 DIAGNOSIS — R26.2 DIFFICULTY WALKING: Primary | ICD-10-CM

## 2025-08-26 DIAGNOSIS — B35.1 ONYCHOMYCOSIS: ICD-10-CM

## 2025-08-26 DIAGNOSIS — M79.672 FOOT PAIN, BILATERAL: ICD-10-CM

## 2025-08-26 DIAGNOSIS — L89.891 PRESSURE INJURY OF RIGHT FOOT, STAGE 1: ICD-10-CM

## 2025-08-26 DIAGNOSIS — M79.671 FOOT PAIN, BILATERAL: ICD-10-CM

## 2025-08-26 DIAGNOSIS — L60.0 ONYCHOCRYPTOSIS: ICD-10-CM

## 2025-08-27 ENCOUNTER — HOSPITAL ENCOUNTER (OUTPATIENT)
Dept: ULTRASOUND IMAGING | Facility: HOSPITAL | Age: 55
Discharge: HOME | End: 2025-08-27
Payer: MEDICARE

## 2025-08-27 ENCOUNTER — HOSPITAL ENCOUNTER (OUTPATIENT)
Dept: MAMMOGRAPHY | Facility: HOSPITAL | Age: 55
Discharge: HOME | End: 2025-08-27
Payer: MEDICARE

## 2025-08-27 DIAGNOSIS — N63.0 MASS OF BREAST, UNSPECIFIED LATERALITY: ICD-10-CM

## 2025-08-27 PROCEDURE — 76642 ULTRASOUND BREAST LIMITED: CPT

## 2025-08-27 PROCEDURE — 77066 DX MAMMO INCL CAD BI: CPT

## 2025-08-27 PROCEDURE — G0279 TOMOSYNTHESIS, MAMMO: HCPCS
